# Patient Record
Sex: MALE | Race: WHITE | NOT HISPANIC OR LATINO | Employment: OTHER | URBAN - METROPOLITAN AREA
[De-identification: names, ages, dates, MRNs, and addresses within clinical notes are randomized per-mention and may not be internally consistent; named-entity substitution may affect disease eponyms.]

---

## 2017-05-08 DIAGNOSIS — E78.5 HYPERLIPIDEMIA: ICD-10-CM

## 2017-05-08 DIAGNOSIS — I10 ESSENTIAL (PRIMARY) HYPERTENSION: ICD-10-CM

## 2017-06-02 ENCOUNTER — TRANSCRIBE ORDERS (OUTPATIENT)
Dept: LAB | Facility: CLINIC | Age: 77
End: 2017-06-02

## 2017-06-02 ENCOUNTER — APPOINTMENT (OUTPATIENT)
Dept: LAB | Facility: CLINIC | Age: 77
End: 2017-06-02
Payer: MEDICARE

## 2017-06-02 DIAGNOSIS — E78.5 HYPERLIPIDEMIA: ICD-10-CM

## 2017-06-02 DIAGNOSIS — E83.119 HEMOCHROMATOSIS: ICD-10-CM

## 2017-06-02 DIAGNOSIS — I10 ESSENTIAL (PRIMARY) HYPERTENSION: ICD-10-CM

## 2017-06-02 LAB
ALBUMIN SERPL BCP-MCNC: 4.1 G/DL (ref 3.5–5)
ALP SERPL-CCNC: 56 U/L (ref 46–116)
ALT SERPL W P-5'-P-CCNC: 24 U/L (ref 12–78)
ANION GAP SERPL CALCULATED.3IONS-SCNC: 7 MMOL/L (ref 4–13)
AST SERPL W P-5'-P-CCNC: 18 U/L (ref 5–45)
BASOPHILS # BLD AUTO: 0.06 THOUSANDS/ΜL (ref 0–0.1)
BASOPHILS NFR BLD AUTO: 1 % (ref 0–1)
BILIRUB SERPL-MCNC: 0.83 MG/DL (ref 0.2–1)
BUN SERPL-MCNC: 22 MG/DL (ref 5–25)
CALCIUM SERPL-MCNC: 8.6 MG/DL (ref 8.3–10.1)
CHLORIDE SERPL-SCNC: 108 MMOL/L (ref 100–108)
CHOLEST SERPL-MCNC: 124 MG/DL (ref 50–200)
CO2 SERPL-SCNC: 27 MMOL/L (ref 21–32)
CREAT SERPL-MCNC: 1.13 MG/DL (ref 0.6–1.3)
EOSINOPHIL # BLD AUTO: 0.22 THOUSAND/ΜL (ref 0–0.61)
EOSINOPHIL NFR BLD AUTO: 3 % (ref 0–6)
ERYTHROCYTE [DISTWIDTH] IN BLOOD BY AUTOMATED COUNT: 13 % (ref 11.6–15.1)
FERRITIN SERPL-MCNC: 79 NG/ML (ref 8–388)
GFR SERPL CREATININE-BSD FRML MDRD: >60 ML/MIN/1.73SQ M
GLUCOSE P FAST SERPL-MCNC: 102 MG/DL (ref 65–99)
HCT VFR BLD AUTO: 44.8 % (ref 36.5–49.3)
HDLC SERPL-MCNC: 33 MG/DL (ref 40–60)
HGB BLD-MCNC: 14.7 G/DL (ref 12–17)
LDLC SERPL CALC-MCNC: 71 MG/DL (ref 0–100)
LYMPHOCYTES # BLD AUTO: 2.49 THOUSANDS/ΜL (ref 0.6–4.47)
LYMPHOCYTES NFR BLD AUTO: 35 % (ref 14–44)
MCH RBC QN AUTO: 29.1 PG (ref 26.8–34.3)
MCHC RBC AUTO-ENTMCNC: 32.8 G/DL (ref 31.4–37.4)
MCV RBC AUTO: 89 FL (ref 82–98)
MONOCYTES # BLD AUTO: 0.69 THOUSAND/ΜL (ref 0.17–1.22)
MONOCYTES NFR BLD AUTO: 10 % (ref 4–12)
NEUTROPHILS # BLD AUTO: 3.57 THOUSANDS/ΜL (ref 1.85–7.62)
NEUTS SEG NFR BLD AUTO: 51 % (ref 43–75)
NRBC BLD AUTO-RTO: 0 /100 WBCS
PLATELET # BLD AUTO: 267 THOUSANDS/UL (ref 149–390)
PMV BLD AUTO: 11.4 FL (ref 8.9–12.7)
POTASSIUM SERPL-SCNC: 4.3 MMOL/L (ref 3.5–5.3)
PROT SERPL-MCNC: 7.6 G/DL (ref 6.4–8.2)
RBC # BLD AUTO: 5.05 MILLION/UL (ref 3.88–5.62)
SODIUM SERPL-SCNC: 142 MMOL/L (ref 136–145)
TRIGL SERPL-MCNC: 102 MG/DL
WBC # BLD AUTO: 7.04 THOUSAND/UL (ref 4.31–10.16)

## 2017-06-02 PROCEDURE — 85025 COMPLETE CBC W/AUTO DIFF WBC: CPT

## 2017-06-02 PROCEDURE — 80061 LIPID PANEL: CPT

## 2017-06-02 PROCEDURE — 82728 ASSAY OF FERRITIN: CPT

## 2017-06-02 PROCEDURE — 36415 COLL VENOUS BLD VENIPUNCTURE: CPT

## 2017-06-02 PROCEDURE — 80053 COMPREHEN METABOLIC PANEL: CPT

## 2017-06-05 ENCOUNTER — TRANSCRIBE ORDERS (OUTPATIENT)
Dept: RADIOLOGY | Facility: CLINIC | Age: 77
End: 2017-06-05

## 2017-06-05 ENCOUNTER — HOSPITAL ENCOUNTER (OUTPATIENT)
Dept: RADIOLOGY | Facility: CLINIC | Age: 77
Discharge: HOME | End: 2017-06-05
Payer: MEDICARE

## 2017-06-05 ENCOUNTER — ALLSCRIPTS OFFICE VISIT (OUTPATIENT)
Dept: OTHER | Facility: OTHER | Age: 77
End: 2017-06-05

## 2017-06-05 DIAGNOSIS — M25.861 OTHER SPECIFIED JOINT DISORDERS, RIGHT KNEE: ICD-10-CM

## 2017-06-05 PROCEDURE — 73562 X-RAY EXAM OF KNEE 3: CPT

## 2017-06-06 ENCOUNTER — HOSPITAL ENCOUNTER (OUTPATIENT)
Dept: RADIOLOGY | Facility: CLINIC | Age: 77
Discharge: HOME | End: 2017-06-06
Payer: MEDICARE

## 2017-06-06 DIAGNOSIS — M25.861 OTHER SPECIFIED JOINT DISORDERS, RIGHT KNEE: ICD-10-CM

## 2017-06-06 PROCEDURE — 76882 US LMTD JT/FCL EVL NVASC XTR: CPT

## 2017-06-09 ENCOUNTER — ALLSCRIPTS OFFICE VISIT (OUTPATIENT)
Dept: OTHER | Facility: OTHER | Age: 77
End: 2017-06-09

## 2017-06-12 ENCOUNTER — ALLSCRIPTS OFFICE VISIT (OUTPATIENT)
Dept: OTHER | Facility: OTHER | Age: 77
End: 2017-06-12

## 2017-07-11 ENCOUNTER — APPOINTMENT (OUTPATIENT)
Dept: AUDIOLOGY | Facility: CLINIC | Age: 77
End: 2017-07-11
Payer: MEDICARE

## 2017-07-11 PROCEDURE — 92557 COMPREHENSIVE HEARING TEST: CPT | Performed by: AUDIOLOGIST

## 2017-08-15 ENCOUNTER — APPOINTMENT (OUTPATIENT)
Dept: AUDIOLOGY | Facility: CLINIC | Age: 77
End: 2017-08-15
Payer: COMMERCIAL

## 2017-08-15 PROCEDURE — V5261 HEARING AID, DIGIT, BIN, BTE: HCPCS | Performed by: AUDIOLOGIST

## 2017-09-29 ENCOUNTER — APPOINTMENT (OUTPATIENT)
Dept: RADIOLOGY | Facility: CLINIC | Age: 77
End: 2017-09-29
Payer: MEDICARE

## 2017-09-29 ENCOUNTER — GENERIC CONVERSION - ENCOUNTER (OUTPATIENT)
Dept: OTHER | Facility: OTHER | Age: 77
End: 2017-09-29

## 2017-09-29 ENCOUNTER — TRANSCRIBE ORDERS (OUTPATIENT)
Dept: RADIOLOGY | Facility: CLINIC | Age: 77
End: 2017-09-29

## 2017-09-29 DIAGNOSIS — M79.671 PAIN OF RIGHT FOOT: ICD-10-CM

## 2017-09-29 PROCEDURE — 73630 X-RAY EXAM OF FOOT: CPT

## 2017-10-02 ENCOUNTER — GENERIC CONVERSION - ENCOUNTER (OUTPATIENT)
Dept: OTHER | Facility: OTHER | Age: 77
End: 2017-10-02

## 2017-12-04 ENCOUNTER — TRANSCRIBE ORDERS (OUTPATIENT)
Dept: LAB | Facility: CLINIC | Age: 77
End: 2017-12-04

## 2017-12-04 ENCOUNTER — APPOINTMENT (OUTPATIENT)
Dept: LAB | Facility: CLINIC | Age: 77
End: 2017-12-04
Payer: MEDICARE

## 2017-12-04 DIAGNOSIS — E83.119 HEMOCHROMATOSIS: ICD-10-CM

## 2017-12-04 LAB
ALBUMIN SERPL BCP-MCNC: 4 G/DL (ref 3.5–5)
ALP SERPL-CCNC: 55 U/L (ref 46–116)
ALT SERPL W P-5'-P-CCNC: 28 U/L (ref 12–78)
ANION GAP SERPL CALCULATED.3IONS-SCNC: 7 MMOL/L (ref 4–13)
AST SERPL W P-5'-P-CCNC: 17 U/L (ref 5–45)
BASOPHILS # BLD AUTO: 0.05 THOUSANDS/ΜL (ref 0–0.1)
BASOPHILS NFR BLD AUTO: 1 % (ref 0–1)
BILIRUB SERPL-MCNC: 0.67 MG/DL (ref 0.2–1)
BUN SERPL-MCNC: 19 MG/DL (ref 5–25)
CALCIUM SERPL-MCNC: 9.1 MG/DL (ref 8.3–10.1)
CHLORIDE SERPL-SCNC: 105 MMOL/L (ref 100–108)
CO2 SERPL-SCNC: 28 MMOL/L (ref 21–32)
CREAT SERPL-MCNC: 1.16 MG/DL (ref 0.6–1.3)
EOSINOPHIL # BLD AUTO: 0.17 THOUSAND/ΜL (ref 0–0.61)
EOSINOPHIL NFR BLD AUTO: 3 % (ref 0–6)
ERYTHROCYTE [DISTWIDTH] IN BLOOD BY AUTOMATED COUNT: 13 % (ref 11.6–15.1)
FERRITIN SERPL-MCNC: 86 NG/ML (ref 8–388)
GFR SERPL CREATININE-BSD FRML MDRD: 61 ML/MIN/1.73SQ M
GLUCOSE SERPL-MCNC: 104 MG/DL (ref 65–140)
HCT VFR BLD AUTO: 45 % (ref 36.5–49.3)
HGB BLD-MCNC: 14.8 G/DL (ref 12–17)
LYMPHOCYTES # BLD AUTO: 1.86 THOUSANDS/ΜL (ref 0.6–4.47)
LYMPHOCYTES NFR BLD AUTO: 27 % (ref 14–44)
MCH RBC QN AUTO: 29.1 PG (ref 26.8–34.3)
MCHC RBC AUTO-ENTMCNC: 32.9 G/DL (ref 31.4–37.4)
MCV RBC AUTO: 88 FL (ref 82–98)
MONOCYTES # BLD AUTO: 0.63 THOUSAND/ΜL (ref 0.17–1.22)
MONOCYTES NFR BLD AUTO: 9 % (ref 4–12)
NEUTROPHILS # BLD AUTO: 4.19 THOUSANDS/ΜL (ref 1.85–7.62)
NEUTS SEG NFR BLD AUTO: 60 % (ref 43–75)
NRBC BLD AUTO-RTO: 0 /100 WBCS
PLATELET # BLD AUTO: 264 THOUSANDS/UL (ref 149–390)
PMV BLD AUTO: 10.9 FL (ref 8.9–12.7)
POTASSIUM SERPL-SCNC: 4.1 MMOL/L (ref 3.5–5.3)
PROT SERPL-MCNC: 7.8 G/DL (ref 6.4–8.2)
RBC # BLD AUTO: 5.09 MILLION/UL (ref 3.88–5.62)
SODIUM SERPL-SCNC: 140 MMOL/L (ref 136–145)
WBC # BLD AUTO: 6.92 THOUSAND/UL (ref 4.31–10.16)

## 2017-12-04 PROCEDURE — 82728 ASSAY OF FERRITIN: CPT

## 2017-12-04 PROCEDURE — 36415 COLL VENOUS BLD VENIPUNCTURE: CPT

## 2017-12-04 PROCEDURE — 85025 COMPLETE CBC W/AUTO DIFF WBC: CPT

## 2017-12-04 PROCEDURE — 80053 COMPREHEN METABOLIC PANEL: CPT

## 2017-12-12 ENCOUNTER — GENERIC CONVERSION - ENCOUNTER (OUTPATIENT)
Dept: OTHER | Facility: OTHER | Age: 77
End: 2017-12-12

## 2017-12-12 DIAGNOSIS — E83.119 HEMOCHROMATOSIS: ICD-10-CM

## 2018-01-10 NOTE — PROGRESS NOTES
Chief Complaint  Patient given high dose flu and pneumonia vaccine  /lpn      Active Problems    1  Acute upper respiratory infection (465 9) (J06 9)   2  Benign essential hypertension (401 1) (I10)   3  CABG   4  CAD, multiple vessel (414 00) (I25 10)   5  Encounter for Zostavax administration (V04 89) (Z23)   6  Gout (274 9) (M10 9)   7  Hearing loss (389 9) (H91 90)   8  Heart disease (429 9) (I51 9)   9  Hemochromatosis (275 03) (E83 119)   10  History of colon polyps (V12 72) (Z86 010)   11  Hyperlipidemia (272 4) (E78 5)   12  Iron overload (275 09) (E83 19)   13  MTHFR mutation (270 4) (E72 12)   14  Need for prophylactic vaccination and inoculation against influenza (V04 81) (Z23)   15  Need for vaccination with 13-polyvalent pneumococcal conjugate vaccine (V03 82) (Z23)    Current Meds   1  Aspirin 81 MG CAPS; Take 1 tablet daily; Therapy: (Recorded:82Eye4498) to Recorded   2  Atorvastatin Calcium 10 MG Oral Tablet; TAKE 1 TABLET DAILY AS DIRECTED; Therapy: 26VQW6535 to (Andriy Bedolla)  Requested for: 88FBF2194; Last   Rx:40Wwx4600 Ordered   3  Homocysteine Formula 800- MCG-MG-MCG Oral Tablet; TAKE 1 TABLET DAILY; Therapy: 47OUH1507 to Recorded   4  Intrinsi B12-Folate 220-927-91 MCG-MCG-MG Oral Tablet; Take 1 tablet daily; Therapy: 12PZD3760 to (Evaluate:85Tcx1238); Last Rx:59Wfv8653 Ordered   5  Omega-3 Fish Oil 1000 MG Oral Capsule; Take 1 capsule twice daily; Therapy: (Recorded:48Plw3678) to Recorded    Allergies    1  Amoxil TABS   2   Tylenol TABS    Plan  Need for prophylactic vaccination and inoculation against influenza    · Fluzone High-Dose 0 5 ML Intramuscular Suspension Prefilled Syringe  Need for vaccination with 13-polyvalent pneumococcal conjugate vaccine    · Prevnar 13 Intramuscular Suspension    Future Appointments    Date/Time Provider Specialty Site   11/02/2016 01:00 PM Brianna Darden   12/05/2016 10:30 AM Amor Herb Shelbi Jones MD [facility]     Signatures   Electronically signed by : PRAMOD Harding ; Oct 26 2016  4:16PM EST                       (Author)

## 2018-01-12 VITALS
SYSTOLIC BLOOD PRESSURE: 110 MMHG | BODY MASS INDEX: 26.56 KG/M2 | TEMPERATURE: 96.8 F | RESPIRATION RATE: 16 BRPM | HEIGHT: 67 IN | HEART RATE: 72 BPM | DIASTOLIC BLOOD PRESSURE: 68 MMHG | OXYGEN SATURATION: 98 % | WEIGHT: 169.25 LBS

## 2018-01-14 VITALS
DIASTOLIC BLOOD PRESSURE: 82 MMHG | HEART RATE: 65 BPM | BODY MASS INDEX: 26.72 KG/M2 | HEIGHT: 67 IN | SYSTOLIC BLOOD PRESSURE: 126 MMHG | WEIGHT: 170.25 LBS

## 2018-01-14 VITALS
RESPIRATION RATE: 16 BRPM | SYSTOLIC BLOOD PRESSURE: 120 MMHG | OXYGEN SATURATION: 97 % | DIASTOLIC BLOOD PRESSURE: 70 MMHG | TEMPERATURE: 97.2 F | HEART RATE: 67 BPM | WEIGHT: 167.8 LBS | BODY MASS INDEX: 26.34 KG/M2 | HEIGHT: 67 IN

## 2018-01-15 NOTE — RESULT NOTES
Discussion/Summary   xr negative for fracture, acute process  there are arthritic changes     Verified Results  * XR FOOT 3+ VIEW RIGHT 66Mpw0735 11:56AM [facility] Order Number: [id number]     Test Name Result Flag Reference   XR FOOT 3+ VW RIGHT (Report)     RIGHT FOOT     INDICATION: Right foot pain  COMPARISON: None     VIEWS: 3     IMAGES: 3     FINDINGS:     There is no acute fracture or dislocation  Narrowing at the 1st metatarsal phalangeal joint  No lytic or blastic lesions are seen  Soft tissues are unremarkable  IMPRESSION:     No acute osseous abnormality         Workstation performed: [id number]     Signed by:   Mary Dickens MD   9/29/17       Signatures   Electronically signed by : DERRICK Ashby; Oct  2 2017  8:00AM EST                       (Author)

## 2018-01-22 VITALS
SYSTOLIC BLOOD PRESSURE: 140 MMHG | TEMPERATURE: 98.3 F | HEIGHT: 67 IN | HEART RATE: 66 BPM | BODY MASS INDEX: 26.21 KG/M2 | RESPIRATION RATE: 16 BRPM | WEIGHT: 167 LBS | DIASTOLIC BLOOD PRESSURE: 80 MMHG

## 2018-01-24 VITALS
OXYGEN SATURATION: 98 % | TEMPERATURE: 96.4 F | SYSTOLIC BLOOD PRESSURE: 142 MMHG | DIASTOLIC BLOOD PRESSURE: 72 MMHG | WEIGHT: 164 LBS | HEIGHT: 67 IN | BODY MASS INDEX: 25.74 KG/M2 | RESPIRATION RATE: 16 BRPM | HEART RATE: 74 BPM

## 2018-03-30 ENCOUNTER — TELEPHONE (OUTPATIENT)
Dept: CARDIOLOGY CLINIC | Facility: CLINIC | Age: 78
End: 2018-03-30

## 2018-03-30 DIAGNOSIS — E78.5 HYPERLIPIDEMIA, UNSPECIFIED HYPERLIPIDEMIA TYPE: Primary | ICD-10-CM

## 2018-03-30 RX ORDER — ATORVASTATIN CALCIUM 10 MG/1
1 TABLET, FILM COATED ORAL DAILY
COMMUNITY
Start: 2015-01-19 | End: 2018-03-30 | Stop reason: SDUPTHER

## 2018-03-30 RX ORDER — ATORVASTATIN CALCIUM 10 MG/1
10 TABLET, FILM COATED ORAL DAILY
Qty: 90 TABLET | Refills: 3 | Status: SHIPPED | OUTPATIENT
Start: 2018-03-30 | End: 2018-04-30 | Stop reason: SDUPTHER

## 2018-04-30 DIAGNOSIS — E78.5 HYPERLIPIDEMIA, UNSPECIFIED HYPERLIPIDEMIA TYPE: ICD-10-CM

## 2018-05-01 RX ORDER — ATORVASTATIN CALCIUM 10 MG/1
TABLET, FILM COATED ORAL
Qty: 90 TABLET | Refills: 3 | Status: SHIPPED | OUTPATIENT
Start: 2018-05-01 | End: 2019-04-27 | Stop reason: SDUPTHER

## 2018-05-15 ENCOUNTER — TELEPHONE (OUTPATIENT)
Dept: CARDIOLOGY CLINIC | Facility: CLINIC | Age: 78
End: 2018-05-15

## 2018-05-15 NOTE — TELEPHONE ENCOUNTER
Does he need blood work done for his follow-up? If so please enter order in epic I will mail it to him

## 2018-05-21 DIAGNOSIS — I25.10 CORONARY ARTERY DISEASE INVOLVING NATIVE CORONARY ARTERY OF NATIVE HEART WITHOUT ANGINA PECTORIS: Primary | ICD-10-CM

## 2018-06-08 ENCOUNTER — TRANSCRIBE ORDERS (OUTPATIENT)
Dept: LAB | Facility: CLINIC | Age: 78
End: 2018-06-08

## 2018-06-08 ENCOUNTER — APPOINTMENT (OUTPATIENT)
Dept: LAB | Facility: CLINIC | Age: 78
End: 2018-06-08
Payer: MEDICARE

## 2018-06-08 DIAGNOSIS — I25.10 ATHEROSCLEROSIS OF NATIVE CORONARY ARTERY WITHOUT ANGINA PECTORIS, UNSPECIFIED WHETHER NATIVE OR TRANSPLANTED HEART: Primary | ICD-10-CM

## 2018-06-08 LAB
ALBUMIN SERPL BCP-MCNC: 4 G/DL (ref 3.5–5)
ALP SERPL-CCNC: 49 U/L (ref 46–116)
ALT SERPL W P-5'-P-CCNC: 31 U/L (ref 12–78)
ANION GAP SERPL CALCULATED.3IONS-SCNC: 8 MMOL/L (ref 4–13)
AST SERPL W P-5'-P-CCNC: 20 U/L (ref 5–45)
BILIRUB SERPL-MCNC: 0.97 MG/DL (ref 0.2–1)
BUN SERPL-MCNC: 21 MG/DL (ref 5–25)
CALCIUM SERPL-MCNC: 8.8 MG/DL (ref 8.3–10.1)
CHLORIDE SERPL-SCNC: 107 MMOL/L (ref 100–108)
CHOLEST SERPL-MCNC: 117 MG/DL (ref 50–200)
CO2 SERPL-SCNC: 26 MMOL/L (ref 21–32)
CREAT SERPL-MCNC: 1.25 MG/DL (ref 0.6–1.3)
GFR SERPL CREATININE-BSD FRML MDRD: 56 ML/MIN/1.73SQ M
GLUCOSE P FAST SERPL-MCNC: 110 MG/DL (ref 65–99)
HDLC SERPL-MCNC: 40 MG/DL (ref 40–60)
LDLC SERPL CALC-MCNC: 61 MG/DL (ref 0–100)
NONHDLC SERPL-MCNC: 77 MG/DL
POTASSIUM SERPL-SCNC: 4.1 MMOL/L (ref 3.5–5.3)
PROT SERPL-MCNC: 7.9 G/DL (ref 6.4–8.2)
SODIUM SERPL-SCNC: 141 MMOL/L (ref 136–145)
TRIGL SERPL-MCNC: 81 MG/DL

## 2018-06-08 PROCEDURE — 80053 COMPREHEN METABOLIC PANEL: CPT | Performed by: INTERNAL MEDICINE

## 2018-06-08 PROCEDURE — 36415 COLL VENOUS BLD VENIPUNCTURE: CPT | Performed by: INTERNAL MEDICINE

## 2018-06-08 PROCEDURE — 80061 LIPID PANEL: CPT | Performed by: INTERNAL MEDICINE

## 2018-06-15 ENCOUNTER — OFFICE VISIT (OUTPATIENT)
Dept: CARDIOLOGY CLINIC | Facility: CLINIC | Age: 78
End: 2018-06-15
Payer: MEDICARE

## 2018-06-15 VITALS
HEIGHT: 67 IN | WEIGHT: 167 LBS | HEART RATE: 61 BPM | DIASTOLIC BLOOD PRESSURE: 76 MMHG | BODY MASS INDEX: 26.21 KG/M2 | SYSTOLIC BLOOD PRESSURE: 120 MMHG | OXYGEN SATURATION: 98 %

## 2018-06-15 DIAGNOSIS — I25.10 CAD, MULTIPLE VESSEL: ICD-10-CM

## 2018-06-15 DIAGNOSIS — Z15.89 MTHFR MUTATION: ICD-10-CM

## 2018-06-15 DIAGNOSIS — Z95.1 S/P CABG X 4: ICD-10-CM

## 2018-06-15 DIAGNOSIS — I25.119 CORONARY ARTERY DISEASE INVOLVING NATIVE CORONARY ARTERY OF NATIVE HEART WITH ANGINA PECTORIS (HCC): Primary | ICD-10-CM

## 2018-06-15 DIAGNOSIS — E78.2 MIXED HYPERLIPIDEMIA: ICD-10-CM

## 2018-06-15 PROCEDURE — 99214 OFFICE O/P EST MOD 30 MIN: CPT | Performed by: INTERNAL MEDICINE

## 2018-06-15 PROCEDURE — 93000 ELECTROCARDIOGRAM COMPLETE: CPT | Performed by: INTERNAL MEDICINE

## 2018-06-15 RX ORDER — IBUPROFEN 200 MG
1 TABLET ORAL 3 TIMES DAILY PRN
COMMUNITY
Start: 2017-09-29

## 2018-06-15 RX ORDER — CHLORAL HYDRATE 500 MG
1 CAPSULE ORAL 2 TIMES DAILY
COMMUNITY
End: 2021-04-30

## 2018-06-15 RX ORDER — VIT B12/INTRINSIC FACT/FOLATE 500-20-800
1 TABLET ORAL DAILY
COMMUNITY
Start: 2015-08-18 | End: 2021-01-29

## 2018-06-15 NOTE — PROGRESS NOTES
Vinay Whelan  1940  [id number]  [facility] [address] [address]    Interval History:  Vinay Whelan is a 69 y o  male who presents for routine coronary artery disease follow-up  The patient underwent coronary artery bypass grafting on January 15, 2015  He had LIMA to LAD, SVG to PDA, SVG to OM  Catheterization was done prior to this for evaluation of chest pain  He had a 70% left main stenosis, 60% LAD stenosis, 80% circumflex disease and 90% PDA stenosis  Surgery had no complications  He continues to exercise daily  He denies any dyspnea with exertion, lower extremity edema, paroxysmal nocturnal dyspnea or chest pain  Denies snoring or other ABBY symptoms  He has been eating well and minimizes salt and sugar in diet  Current symptoms: none  Cardiac risk factors include advanced age (older than 54 for men, 72 for women), dyslipidemia and male gender  Past Medical History:   Diagnosis Date    Benign essential hypertension     CAD (coronary artery disease)     Cardiac disorder     Hearing problem     Hx of CABG     Hypercholesteremia      Past Surgical History:   Procedure Laterality Date    CORONARY ARTERY BYPASS GRAFT      HERNIA REPAIR      KNEE SURGERY       Social History     Social History    Marital status: /Civil Union     Spouse name: N/A    Number of children: N/A    Years of education: N/A     Occupational History    Not on file       Social History Main Topics    Smoking status: Former Smoker    Smokeless tobacco: Never Used    Alcohol use No    Drug use: Unknown    Sexual activity: Not on file     Other Topics Concern    Not on file     Social History Narrative    No narrative on file     Family History   Problem Relation Age of Onset    Hypertension Mother     Coronary artery disease Father     Stroke Father     Aneurysm Sister         abdo aorta       Current Outpatient Prescriptions:     aspirin 81 MG tablet, Take 1 tablet by mouth daily, Disp: , Rfl:     atorvastatin (LIPITOR) 10 mg tablet, TAKE 1 TABLET DAILY AS  DIRECTED, Disp: 90 tablet, Rfl: 3    Folate-B12-Intrinsic Factor (INTRINSI Z06-GCBKFV) 815-271-64 MCG-MCG-MG TABS, Take 1 tablet by mouth daily, Disp: , Rfl:     ibuprofen (ADVIL) 200 mg tablet, Take 1 tablet by mouth 3 (three) times a day as needed, Disp: , Rfl:     Multiple Vitamins-Minerals (MULTI-VITAMIN/MINERALS PO), Take 1 tablet by mouth daily, Disp: , Rfl:     Omega-3 Fatty Acids (OMEGA-3 FISH OIL) 1000 MG CAPS, Take 1 capsule by mouth 2 (two) times a day, Disp: , Rfl:   The following portions of the patient's history were reviewed and updated as appropriate: allergies, current medications, past family history, past medical history, past social history, past surgical history and problem list     Review of Systems:  Review of Systems   Constitutional: Negative for chills, fatigue and fever  HENT: Negative for congestion, nosebleeds and postnasal drip  Respiratory: Negative for cough, chest tightness and shortness of breath  Cardiovascular: Negative for chest pain, palpitations and leg swelling  Gastrointestinal: Negative for abdominal distention, abdominal pain, diarrhea, nausea and vomiting  Endocrine: Negative for polydipsia, polyphagia and polyuria  Musculoskeletal: Negative for gait problem and myalgias  Skin: Negative for color change, pallor and rash  Allergic/Immunologic: Negative for environmental allergies, food allergies and immunocompromised state  Neurological: Negative for dizziness, seizures, syncope and light-headedness  Hematological: Negative for adenopathy  Does not bruise/bleed easily  Psychiatric/Behavioral: Negative for dysphoric mood  The patient is not nervous/anxious          Physical Exam:  /76 (BP Location: Left arm, Patient Position: Sitting, Cuff Size: Standard)   Pulse 61 Comment: apical  Ht 5' 6 5" (1 689 m)   Wt 75 8 kg (167 lb)   SpO2 98% Comment: RA  BMI 26 55 kg/m²   Physical Exam   Constitutional: He is oriented to person, place, and time  He appears well-developed  No distress  HENT:   Head: Normocephalic and atraumatic  Eyes: Conjunctivae and EOM are normal  Pupils are equal, round, and reactive to light  Neck: Neck supple  No JVD present  No thyromegaly present  Cardiovascular: Normal rate, regular rhythm, normal heart sounds and intact distal pulses  Exam reveals no gallop and no friction rub  No murmur heard  Pulmonary/Chest: Effort normal and breath sounds normal    Abdominal: Soft  He exhibits no distension  There is no tenderness  Musculoskeletal: He exhibits no edema  Neurological: He is alert and oriented to person, place, and time  No cranial nerve deficit  Skin: Skin is warm and dry  No rash noted  He is not diaphoretic  No erythema  Psychiatric: He has a normal mood and affect  His behavior is normal  Judgment and thought content normal      Cardiographics  ECG: normal sinus rhythm, incomplete RBBB  LV Ejection Fraction: LV ejection fraction >= 40%  Imaging:No results found      Lab Review  Lab Results   Component Value Date    CHOL 117 06/08/2018    CHOL 124 06/02/2017    CHOL 126 05/27/2016    CHOL 160 01/14/2015    TRIG 81 06/08/2018    TRIG 102 06/02/2017    TRIG 88 05/27/2016    TRIG 131 01/14/2015    HDL 40 06/08/2018    HDL 33 (L) 06/02/2017    HDL 35 (L) 05/27/2016    HDL 28 01/14/2015     Orders Only on 05/21/2018   Component Date Value    Cholesterol 06/08/2018 117     Triglycerides 06/08/2018 81     HDL, Direct 06/08/2018 40     LDL Calculated 06/08/2018 61     Non-HDL-Chol (CHOL-HDL) 06/08/2018 77     Sodium 06/08/2018 141     Potassium 06/08/2018 4 1     Chloride 06/08/2018 107     CO2 06/08/2018 26     Anion Gap 06/08/2018 8     BUN 06/08/2018 21     Creatinine 06/08/2018 1 25     Glucose, Fasting 06/08/2018 110*    Calcium 06/08/2018 8 8     AST 06/08/2018 20     ALT 06/08/2018 31     Alkaline Phosphatase 06/08/2018 49     Total Protein 06/08/2018 7 9     Albumin 06/08/2018 4 0     Total Bilirubin 06/08/2018 0 97     eGFR 06/08/2018 56      Assessment/Plan     1  Coronary artery disease involving native coronary artery of native heart with angina pectoris ([facility] )    2  CAD, multiple vessel    3  S/P CABG x 4    4  Mixed hyperlipidemia    5   MTHFR mutation ([facility] )      - LDL at goal (<70 mg/dL)  - BP is well controlled without medications  - continue regular exercise and diet  - Continue atorvastatin

## 2018-10-29 ENCOUNTER — CLINICAL SUPPORT (OUTPATIENT)
Dept: FAMILY MEDICINE CLINIC | Facility: CLINIC | Age: 78
End: 2018-10-29
Payer: MEDICARE

## 2018-10-29 DIAGNOSIS — Z23 NEED FOR INFLUENZA VACCINATION: Primary | ICD-10-CM

## 2018-10-29 PROCEDURE — 90662 IIV NO PRSV INCREASED AG IM: CPT

## 2018-10-29 PROCEDURE — G0008 ADMIN INFLUENZA VIRUS VAC: HCPCS

## 2018-12-10 ENCOUNTER — APPOINTMENT (OUTPATIENT)
Dept: LAB | Facility: CLINIC | Age: 78
End: 2018-12-10
Payer: MEDICARE

## 2018-12-10 ENCOUNTER — TRANSCRIBE ORDERS (OUTPATIENT)
Dept: LAB | Facility: CLINIC | Age: 78
End: 2018-12-10

## 2018-12-10 DIAGNOSIS — E83.119 DILATED CARDIOMYOPATHY SECONDARY TO HEMOCHROMATOSIS (HCC): Primary | ICD-10-CM

## 2018-12-10 DIAGNOSIS — E83.119 HEMOCHROMATOSIS: ICD-10-CM

## 2018-12-10 DIAGNOSIS — I43 DILATED CARDIOMYOPATHY SECONDARY TO HEMOCHROMATOSIS (HCC): Primary | ICD-10-CM

## 2018-12-10 LAB
ALBUMIN SERPL BCP-MCNC: 4.1 G/DL (ref 3.5–5)
ALP SERPL-CCNC: 51 U/L (ref 46–116)
ALT SERPL W P-5'-P-CCNC: 26 U/L (ref 12–78)
ANION GAP SERPL CALCULATED.3IONS-SCNC: 8 MMOL/L (ref 4–13)
AST SERPL W P-5'-P-CCNC: 20 U/L (ref 5–45)
BASOPHILS # BLD AUTO: 0.07 THOUSANDS/ΜL (ref 0–0.1)
BASOPHILS NFR BLD AUTO: 1 % (ref 0–1)
BILIRUB SERPL-MCNC: 0.79 MG/DL (ref 0.2–1)
BUN SERPL-MCNC: 20 MG/DL (ref 5–25)
CALCIUM SERPL-MCNC: 9.4 MG/DL (ref 8.3–10.1)
CHLORIDE SERPL-SCNC: 106 MMOL/L (ref 100–108)
CO2 SERPL-SCNC: 25 MMOL/L (ref 21–32)
CREAT SERPL-MCNC: 1.37 MG/DL (ref 0.6–1.3)
EOSINOPHIL # BLD AUTO: 0.15 THOUSAND/ΜL (ref 0–0.61)
EOSINOPHIL NFR BLD AUTO: 2 % (ref 0–6)
ERYTHROCYTE [DISTWIDTH] IN BLOOD BY AUTOMATED COUNT: 12.2 % (ref 11.6–15.1)
FERRITIN SERPL-MCNC: 88 NG/ML (ref 8–388)
GFR SERPL CREATININE-BSD FRML MDRD: 49 ML/MIN/1.73SQ M
GLUCOSE P FAST SERPL-MCNC: 115 MG/DL (ref 65–99)
HCT VFR BLD AUTO: 47.1 % (ref 36.5–49.3)
HGB BLD-MCNC: 15.3 G/DL (ref 12–17)
IMM GRANULOCYTES # BLD AUTO: 0.01 THOUSAND/UL (ref 0–0.2)
IMM GRANULOCYTES NFR BLD AUTO: 0 % (ref 0–2)
LYMPHOCYTES # BLD AUTO: 1.61 THOUSANDS/ΜL (ref 0.6–4.47)
LYMPHOCYTES NFR BLD AUTO: 25 % (ref 14–44)
MCH RBC QN AUTO: 29 PG (ref 26.8–34.3)
MCHC RBC AUTO-ENTMCNC: 32.5 G/DL (ref 31.4–37.4)
MCV RBC AUTO: 89 FL (ref 82–98)
MONOCYTES # BLD AUTO: 0.52 THOUSAND/ΜL (ref 0.17–1.22)
MONOCYTES NFR BLD AUTO: 8 % (ref 4–12)
NEUTROPHILS # BLD AUTO: 4.2 THOUSANDS/ΜL (ref 1.85–7.62)
NEUTS SEG NFR BLD AUTO: 64 % (ref 43–75)
NRBC BLD AUTO-RTO: 0 /100 WBCS
PLATELET # BLD AUTO: 271 THOUSANDS/UL (ref 149–390)
PMV BLD AUTO: 11 FL (ref 8.9–12.7)
POTASSIUM SERPL-SCNC: 3.8 MMOL/L (ref 3.5–5.3)
PROT SERPL-MCNC: 7.8 G/DL (ref 6.4–8.2)
RBC # BLD AUTO: 5.28 MILLION/UL (ref 3.88–5.62)
SODIUM SERPL-SCNC: 139 MMOL/L (ref 136–145)
WBC # BLD AUTO: 6.56 THOUSAND/UL (ref 4.31–10.16)

## 2018-12-10 PROCEDURE — 85025 COMPLETE CBC W/AUTO DIFF WBC: CPT

## 2018-12-10 PROCEDURE — 36415 COLL VENOUS BLD VENIPUNCTURE: CPT | Performed by: INTERNAL MEDICINE

## 2018-12-10 PROCEDURE — 80053 COMPREHEN METABOLIC PANEL: CPT | Performed by: INTERNAL MEDICINE

## 2018-12-10 PROCEDURE — 82728 ASSAY OF FERRITIN: CPT

## 2018-12-12 DIAGNOSIS — E83.119 HEMOCHROMATOSIS: ICD-10-CM

## 2018-12-17 ENCOUNTER — OFFICE VISIT (OUTPATIENT)
Dept: HEMATOLOGY ONCOLOGY | Facility: MEDICAL CENTER | Age: 78
End: 2018-12-17
Payer: MEDICARE

## 2018-12-17 VITALS
HEIGHT: 67 IN | DIASTOLIC BLOOD PRESSURE: 81 MMHG | SYSTOLIC BLOOD PRESSURE: 129 MMHG | RESPIRATION RATE: 18 BRPM | HEART RATE: 62 BPM | OXYGEN SATURATION: 95 % | TEMPERATURE: 98.1 F | BODY MASS INDEX: 26.68 KG/M2 | WEIGHT: 170 LBS

## 2018-12-17 DIAGNOSIS — R79.89 ELEVATED LFTS: Primary | ICD-10-CM

## 2018-12-17 PROCEDURE — 99213 OFFICE O/P EST LOW 20 MIN: CPT | Performed by: INTERNAL MEDICINE

## 2018-12-17 NOTE — PROGRESS NOTES
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 and a history of elevated LFTs  Previously patient had undergone phlebotomies with normalization of the liver function tests  Last phlebotomy was approximately 3 years ago  From a hematology standpoint, feels well and clinically there are no troubling signs  Recent blood work was good/acceptable with a normal ferritin level  Phlebotomy is not needed  Patient is to return in 1 year with blood work before  From hematology standpoint, Mr Cuca Landry can have an occasional beer/drink - patient understands that excessive alcohol use can affect liver function test     Patient knows to call the hematology/oncology office if there are any other questions or concerns  Carefully review your medication list and verify that the list is accurate and up-to-date  Please call the hematology/oncology office if there are medications missing from the list, medications on the list that you are not currently taking or if there is a dosage or instruction that is different from how you're taking that medication  Patient goals and areas of care:   Monitor LFTs  Barriers to care:   None  Patient is able to self-care   ____________________________________________________________________________________________________    Chief Complaint   Patient presents with    Follow-up     Elevated LFTs     History of Present Illness:    80-year-old male for seen in August 2012 for evaluation of abnormal liver function test   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  After discussing options, patient began phlebotomies; last in November 2015  At that time, liver function tests have been normal   Patient returns for follow-up  Mr Cuca Landry states feeling well, baseline  Appetite is good, weight is stable    Activities are baseline  No fevers, chills or sweats  No headaches, blurred vision or dizziness, no shortness of breath or dyspnea on exertion  No chest pain or pressure  Patient follows up with cardiology - no recent issues  Routine health maintenance and medical care is up-to-date  Review of Systems   Constitutional: Negative  HENT: Negative  Eyes: Negative  Respiratory: Negative  Cardiovascular: Negative  Gastrointestinal: Negative  Endocrine: Negative  Genitourinary: Negative  Musculoskeletal: Negative  Skin: Negative  Allergic/Immunologic: Negative  Neurological: Negative  Hematological: Negative  Psychiatric/Behavioral: Negative  All other systems reviewed and are negative  Patient Active Problem List   Diagnosis    CAD, multiple vessel    S/P CABG x 4    Hyperlipidemia    [facility]     Past Medical History:   Diagnosis Date    Benign essential hypertension     CAD (coronary artery disease)     Cardiac disorder     Hearing problem     Hx of CABG     Hypercholesteremia      Past Surgical History:   Procedure Laterality Date    CORONARY ARTERY BYPASS GRAFT      HERNIA REPAIR      KNEE SURGERY       Family History   Problem Relation Age of Onset    Hypertension Mother     Coronary artery disease Father     Stroke Father     Aneurysm Sister         abdo aorta     Social History     Social History    Marital status: /Civil Union     Spouse name: N/A    Number of children: N/A    Years of education: N/A     Occupational History    Not on file       Social History Main Topics    Smoking status: Former Smoker    Smokeless tobacco: Never Used    Alcohol use No    Drug use: Unknown    Sexual activity: Not on file     Other Topics Concern    Not on file     Social History Narrative    No narrative on file       Current Outpatient Prescriptions:     aspirin 81 MG tablet, Take 1 tablet by mouth daily, Disp: , Rfl:     atorvastatin (LIPITOR) 10 mg tablet, TAKE 1 TABLET DAILY AS  DIRECTED, Disp: 90 tablet, Rfl: 3    Folate-B12-Intrinsic Factor (INTRINSI B12-FOLATE) 811-700-81 MCG-MCG-MG TABS, Take 1 tablet by mouth daily, Disp: , Rfl:     ibuprofen (ADVIL) 200 mg tablet, Take 1 tablet by mouth 3 (three) times a day as needed, Disp: , Rfl:     Multiple Vitamins-Minerals (MULTI-VITAMIN/MINERALS PO), Take 1 tablet by mouth daily, Disp: , Rfl:     Omega-3 Fatty Acids (OMEGA-3 FISH OIL) 1000 MG CAPS, Take 1 capsule by mouth 2 (two) times a day, Disp: , Rfl:     Allergies   Allergen Reactions    Acetaminophen      Reaction Date: [id number]; Annotation - [id number]: throat swelling   mad    Amoxicillin      Reaction Date: [id number]; Annotation - [id number]: rash   mad       Vitals:    12/17/18 0800   BP: 129/81   Pulse: 62   Resp: 18   Temp: 98 1 °F (36 7 °C)   SpO2: 95%     Physical Exam   Constitutional: He is oriented to person, place, and time  He appears well-developed and well-nourished  Well-nourished male, no respiratory distress   HENT:   Head: Normocephalic and atraumatic  Right Ear: External ear normal    Left Ear: External ear normal    Mouth/Throat: Oropharynx is clear and moist    Eyes: Pupils are equal, round, and reactive to light  Conjunctivae and EOM are normal    Neck: Normal range of motion  Neck supple  Supple, no JVD   Cardiovascular: Normal rate, regular rhythm, normal heart sounds and intact distal pulses  Pulmonary/Chest: Effort normal and breath sounds normal    Good air entry bilaterally, clear   Abdominal: Soft  Bowel sounds are normal    Soft, nontender, +bowel sounds, no hepatosplenomegaly   Musculoskeletal: Normal range of motion  Full range of motion in all 4 extremities, no pain or tenderness with palpation of joints, muscles or bones   Neurological: He is alert and oriented to person, place, and time  He has normal reflexes  Skin: Skin is warm     Warm, moist, good color, no petechiae or ecchymoses Psychiatric: He has a normal mood and affect  His behavior is normal  Judgment and thought content normal    Extremities:   No lower extremity edema, no cords, pulses are 1+  Lymphatics:   No adenopathy in the neck    Labs    12/10/2018 WBC = 6 5 hemoglobin = 15 3 hematocrit = 47 1 platelet = 222 neutrophil = 64% BUN = 20 creatinine = 1 37 calcium = 9 4 AST = 20 ALT = 26 alkaline phosphatase = 51 total protein = 7 8 total bilirubin = 0 7 ferritin = 88    12/04/2017 WBC = 6 9 hemoglobin = 14 8 hematocrit = 45 platelet = 611 neutrophil = 60% glucose = 104 BUN = 19 creatinine = 1 16 AST = 17 ALT = 28 total bilirubin = 0 67 alkaline phosphatase = 55 ferritin = 86    Imaging    MRI of the abdomen with and without contrast performed on June 22, 2012 demonstrated 2 subcentimeter benign hemangiomas in the right hepatic lobe  Patient also had hepatic steatosis  Pathology    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

## 2019-04-17 ENCOUNTER — TELEPHONE (OUTPATIENT)
Dept: CARDIOLOGY CLINIC | Facility: CLINIC | Age: 79
End: 2019-04-17

## 2019-04-25 ENCOUNTER — OFFICE VISIT (OUTPATIENT)
Dept: AUDIOLOGY | Facility: CLINIC | Age: 79
End: 2019-04-25

## 2019-04-25 DIAGNOSIS — H90.3 SENSORINEURAL HEARING LOSS, BILATERAL: Primary | ICD-10-CM

## 2019-04-27 DIAGNOSIS — E78.5 HYPERLIPIDEMIA, UNSPECIFIED HYPERLIPIDEMIA TYPE: ICD-10-CM

## 2019-04-28 DIAGNOSIS — I25.10 CAD, MULTIPLE VESSEL: Primary | ICD-10-CM

## 2019-04-28 RX ORDER — ATORVASTATIN CALCIUM 10 MG/1
TABLET, FILM COATED ORAL
Qty: 90 TABLET | Refills: 3 | Status: SHIPPED | OUTPATIENT
Start: 2019-04-28 | End: 2020-05-01 | Stop reason: SDUPTHER

## 2019-05-07 ENCOUNTER — OFFICE VISIT (OUTPATIENT)
Dept: AUDIOLOGY | Facility: CLINIC | Age: 79
End: 2019-05-07

## 2019-05-07 DIAGNOSIS — H90.3 SENSORINEURAL HEARING LOSS, BILATERAL: Primary | ICD-10-CM

## 2019-05-29 ENCOUNTER — TRANSCRIBE ORDERS (OUTPATIENT)
Dept: LAB | Facility: CLINIC | Age: 79
End: 2019-05-29

## 2019-05-29 ENCOUNTER — APPOINTMENT (OUTPATIENT)
Dept: LAB | Facility: CLINIC | Age: 79
End: 2019-05-29
Payer: COMMERCIAL

## 2019-05-29 DIAGNOSIS — I25.10 CAD, MULTIPLE VESSEL: ICD-10-CM

## 2019-05-29 DIAGNOSIS — I25.10 DISEASE OF CARDIOVASCULAR SYSTEM: Primary | ICD-10-CM

## 2019-05-29 LAB
ALBUMIN SERPL BCP-MCNC: 4.2 G/DL (ref 3.5–5)
ALP SERPL-CCNC: 56 U/L (ref 46–116)
ALT SERPL W P-5'-P-CCNC: 28 U/L (ref 12–78)
ANION GAP SERPL CALCULATED.3IONS-SCNC: 11 MMOL/L (ref 4–13)
AST SERPL W P-5'-P-CCNC: 16 U/L (ref 5–45)
BILIRUB SERPL-MCNC: 0.69 MG/DL (ref 0.2–1)
BUN SERPL-MCNC: 18 MG/DL (ref 5–25)
CALCIUM SERPL-MCNC: 8.8 MG/DL (ref 8.3–10.1)
CHLORIDE SERPL-SCNC: 110 MMOL/L (ref 100–108)
CHOLEST SERPL-MCNC: 128 MG/DL (ref 50–200)
CO2 SERPL-SCNC: 23 MMOL/L (ref 21–32)
CREAT SERPL-MCNC: 1.21 MG/DL (ref 0.6–1.3)
GFR SERPL CREATININE-BSD FRML MDRD: 57 ML/MIN/1.73SQ M
GLUCOSE P FAST SERPL-MCNC: 115 MG/DL (ref 65–99)
HDLC SERPL-MCNC: 38 MG/DL (ref 40–60)
LDLC SERPL CALC-MCNC: 63 MG/DL (ref 0–100)
POTASSIUM SERPL-SCNC: 4.1 MMOL/L (ref 3.5–5.3)
PROT SERPL-MCNC: 7.7 G/DL (ref 6.4–8.2)
SODIUM SERPL-SCNC: 144 MMOL/L (ref 136–145)
TRIGL SERPL-MCNC: 136 MG/DL

## 2019-05-29 PROCEDURE — 36415 COLL VENOUS BLD VENIPUNCTURE: CPT | Performed by: INTERNAL MEDICINE

## 2019-05-29 PROCEDURE — 80053 COMPREHEN METABOLIC PANEL: CPT | Performed by: INTERNAL MEDICINE

## 2019-05-29 PROCEDURE — 80061 LIPID PANEL: CPT

## 2019-06-07 ENCOUNTER — OFFICE VISIT (OUTPATIENT)
Dept: CARDIOLOGY CLINIC | Facility: CLINIC | Age: 79
End: 2019-06-07
Payer: COMMERCIAL

## 2019-06-07 VITALS
DIASTOLIC BLOOD PRESSURE: 78 MMHG | WEIGHT: 166 LBS | SYSTOLIC BLOOD PRESSURE: 126 MMHG | BODY MASS INDEX: 26.06 KG/M2 | HEIGHT: 67 IN | OXYGEN SATURATION: 98 % | HEART RATE: 66 BPM

## 2019-06-07 DIAGNOSIS — E78.2 MIXED HYPERLIPIDEMIA: ICD-10-CM

## 2019-06-07 DIAGNOSIS — Z95.1 S/P CABG X 4: ICD-10-CM

## 2019-06-07 DIAGNOSIS — Z15.89 MTHFR MUTATION: ICD-10-CM

## 2019-06-07 DIAGNOSIS — I25.10 CAD, MULTIPLE VESSEL: Primary | ICD-10-CM

## 2019-06-07 PROCEDURE — 93000 ELECTROCARDIOGRAM COMPLETE: CPT | Performed by: INTERNAL MEDICINE

## 2019-06-07 PROCEDURE — 99214 OFFICE O/P EST MOD 30 MIN: CPT | Performed by: INTERNAL MEDICINE

## 2019-11-04 ENCOUNTER — CLINICAL SUPPORT (OUTPATIENT)
Dept: FAMILY MEDICINE CLINIC | Facility: CLINIC | Age: 79
End: 2019-11-04
Payer: COMMERCIAL

## 2019-11-04 DIAGNOSIS — Z23 NEED FOR INFLUENZA VACCINATION: Primary | ICD-10-CM

## 2019-11-04 PROCEDURE — 90662 IIV NO PRSV INCREASED AG IM: CPT

## 2019-11-04 PROCEDURE — G0008 ADMIN INFLUENZA VIRUS VAC: HCPCS

## 2019-11-18 ENCOUNTER — OFFICE VISIT (OUTPATIENT)
Dept: FAMILY MEDICINE CLINIC | Facility: CLINIC | Age: 79
End: 2019-11-18
Payer: COMMERCIAL

## 2019-11-18 VITALS
WEIGHT: 168 LBS | HEIGHT: 67 IN | RESPIRATION RATE: 16 BRPM | HEART RATE: 68 BPM | SYSTOLIC BLOOD PRESSURE: 130 MMHG | TEMPERATURE: 98.6 F | BODY MASS INDEX: 26.37 KG/M2 | DIASTOLIC BLOOD PRESSURE: 80 MMHG

## 2019-11-18 DIAGNOSIS — Z13.820 SCREENING FOR OSTEOPOROSIS: ICD-10-CM

## 2019-11-18 DIAGNOSIS — Z23 ENCOUNTER FOR IMMUNIZATION: ICD-10-CM

## 2019-11-18 DIAGNOSIS — E78.2 MIXED HYPERLIPIDEMIA: ICD-10-CM

## 2019-11-18 DIAGNOSIS — Z12.5 SCREENING PSA (PROSTATE SPECIFIC ANTIGEN): ICD-10-CM

## 2019-11-18 DIAGNOSIS — Z00.00 MEDICARE ANNUAL WELLNESS VISIT, INITIAL: Primary | ICD-10-CM

## 2019-11-18 DIAGNOSIS — E83.118 OTHER HEMOCHROMATOSIS: ICD-10-CM

## 2019-11-18 DIAGNOSIS — I25.10 CAD, MULTIPLE VESSEL: ICD-10-CM

## 2019-11-18 DIAGNOSIS — Z13.1 SCREENING FOR DIABETES MELLITUS (DM): ICD-10-CM

## 2019-11-18 PROCEDURE — 3725F SCREEN DEPRESSION PERFORMED: CPT | Performed by: FAMILY MEDICINE

## 2019-11-18 PROCEDURE — 1101F PT FALLS ASSESS-DOCD LE1/YR: CPT | Performed by: FAMILY MEDICINE

## 2019-11-18 PROCEDURE — G0438 PPPS, INITIAL VISIT: HCPCS | Performed by: FAMILY MEDICINE

## 2019-11-18 PROCEDURE — G0009 ADMIN PNEUMOCOCCAL VACCINE: HCPCS | Performed by: FAMILY MEDICINE

## 2019-11-18 PROCEDURE — 1160F RVW MEDS BY RX/DR IN RCRD: CPT | Performed by: FAMILY MEDICINE

## 2019-11-18 PROCEDURE — 4040F PNEUMOC VAC/ADMIN/RCVD: CPT | Performed by: FAMILY MEDICINE

## 2019-11-18 PROCEDURE — 90732 PPSV23 VACC 2 YRS+ SUBQ/IM: CPT | Performed by: FAMILY MEDICINE

## 2019-11-18 PROCEDURE — 1036F TOBACCO NON-USER: CPT | Performed by: FAMILY MEDICINE

## 2019-11-18 NOTE — PATIENT INSTRUCTIONS

## 2019-11-18 NOTE — PROGRESS NOTES
Assessment and Plan:     Problem List Items Addressed This Visit     None      Visit Diagnoses     Medicare annual wellness visit, initial    -  Primary    Relevant Orders    PSA, Total Screen    DXA bone density spine hip and pelvis    Hemoglobin A1C    Encounter for immunization        Relevant Orders    PNEUMOCOCCAL POLYSACCHARIDE VACCINE 23-VALENT =>1YO SQ IM (Pneumovax) (Completed)    Screening for osteoporosis        Relevant Orders    DXA bone density spine hip and pelvis    Screening for diabetes mellitus (DM)        Relevant Orders    Hemoglobin A1C    Screening PSA (prostate specific antigen)        Relevant Orders    PSA, Total Screen        BMI Counseling: Body mass index is 26 71 kg/m²  The BMI is above normal  Nutrition recommendations include consuming healthier snacks  Exercise recommendations include exercising 3-5 times per week  Coronary artery disease very well managed by Cardiology  Hyperlipidemia taking medications appropriately continue on dosage  Reviewed fasting lipid panel  Hemochromatosis currently 3 years without any phlebotomy levels being monitored by Oncology  Sent for DEXA scan  Encourage use multivitamins  Sent for prostate level  If abnormal or patient becomes symptomatic will do prostate exam at that time      Preventive health issues were discussed with patient, and age appropriate screening tests were ordered as noted in patient's After Visit Summary  Personalized health advice and appropriate referrals for health education or preventive services given if needed, as noted in patient's After Visit Summary  History of Present Illness:     Patient presents for Medicare Annual Wellness visit  Significant past medical history of coronary artery disease and hemochromatosis  Patient states that he has not needed any phlebotomy for his hemochromatosis in over 3 years  He has serial colonoscopies every 5 years with our gastroenterologist given his family history    Patient is coronary artery disease is very well managed with Cardiology after his bypass in 2016  Currently the patient has no symptoms or concerns that he would like to discuss  Denies any shortness of breath or chest pain, fevers or chills, GI or  symptoms  Denies any urinary symptoms as well      Patient Care Team:  Vicky Ashraf MD as PCP - General (Family Medicine)  MD Isma Gamboa MD Raeann Leader, MD Castillo Hyatt DO     Problem List:     Patient Active Problem List   Diagnosis    CAD, multiple vessel    S/P CABG x 4    Hyperlipidemia    MTHFR mutation ([address] )    Elevated LFTs      Past Medical and Surgical History:     Past Medical History:   Diagnosis Date    Benign essential hypertension     CAD (coronary artery disease)     Cardiac disorder     Hearing problem     Hx of CABG     Hypercholesteremia      Past Surgical History:   Procedure Laterality Date    CORONARY ARTERY BYPASS GRAFT      HERNIA REPAIR      KNEE SURGERY        Family History:     Family History   Problem Relation Age of Onset    Hypertension Mother     Coronary artery disease Father     Stroke Father     Aneurysm Sister         abdo aorta      Social History:     Social History     Socioeconomic History    Marital status: /Civil Union     Spouse name: None    Number of children: None    Years of education: None    Highest education level: None   Occupational History    None   Social Needs    Financial resource strain: None    Food insecurity:     Worry: None     Inability: None    Transportation needs:     Medical: None     Non-medical: None   Tobacco Use    Smoking status: Former Smoker    Smokeless tobacco: Never Used   Substance and Sexual Activity    Alcohol use: No    Drug use: None    Sexual activity: None   Lifestyle    Physical activity:     Days per week: None     Minutes per session: None    Stress: None   Relationships    Social connections:     Talks on phone: None Gets together: None     Attends Scientologist service: None     Active member of club or organization: None     Attends meetings of clubs or organizations: None     Relationship status: None    Intimate partner violence:     Fear of current or ex partner: None     Emotionally abused: None     Physically abused: None     Forced sexual activity: None   Other Topics Concern    None   Social History Narrative    None       Medications and Allergies:     Current Outpatient Medications   Medication Sig Dispense Refill    aspirin 81 MG tablet Take 1 tablet by mouth daily      atorvastatin (LIPITOR) 10 mg tablet TAKE 1 TABLET DAILY AS  DIRECTED 90 tablet 3    Folate-B12-Intrinsic Factor (INTRINSI B12-FOLATE) 800-500-20 MCG-MCG-MG TABS Take 1 tablet by mouth daily      ibuprofen (ADVIL) 200 mg tablet Take 1 tablet by mouth 3 (three) times a day as needed      Multiple Vitamins-Minerals (MULTI-VITAMIN/MINERALS PO) Take 1 tablet by mouth daily      Omega-3 Fatty Acids (OMEGA-3 FISH OIL) 1000 MG CAPS Take 1 capsule by mouth 2 (two) times a day       No current facility-administered medications for this visit  Allergies   Allergen Reactions    Acetaminophen      Reaction Date: [id number]; Annotation - [id number]: throat swelling   mad    Amoxicillin      Reaction Date: [id number]; Annotation - [id number]: rash   mad      Immunizations:     Immunization History   Administered Date(s) Administered    Influenza Split High Dose Preservative Free IM 10/02/2012, 10/25/2013, 10/15/2014, 10/23/2015, 10/26/2016, 09/29/2017    Influenza TIV (IM) 11/11/2005, 11/21/2006, 11/13/2007, 11/24/2008, 12/04/2009, 12/01/2010, 09/19/2011    Influenza, high dose seasonal 0 5 mL 10/29/2018, 11/04/2019    Pneumococcal Conjugate 13-Valent 10/26/2016    Pneumococcal Polysaccharide PPV23 11/21/2006, 11/18/2019    Td (adult), adsorbed 10/24/2000    Zoster 10/28/2015      Health Maintenance:         Topic Date Due    CRC Screening: Colonoscopy  1940     There are no preventive care reminders to display for this patient  Medicare Health Risk Assessment:     /80 (BP Location: Right arm, Patient Position: Sitting, Cuff Size: Standard)   Pulse 68   Temp 98 6 °F (37 °C)   Resp 16   Ht 5' 6 5" (1 689 m)   Wt 76 2 kg (168 lb)   BMI 26 71 kg/m²      Bubba Lopez is here for his Initial Wellness visit  Health Risk Assessment:   Patient rates overall health as excellent  Patient feels that their physical health rating is same  Eyesight was rated as same  Hearing was rated as same  Patient feels that their emotional and mental health rating is same  Pain experienced in the last 7 days has been none  Depression Screening:   PHQ-2 Score: 0      Fall Risk Screening: In the past year, patient has experienced: no history of falling in past year      Home Safety:  Patient does not have trouble with stairs inside or outside of their home  Patient has working smoke alarms and has working carbon monoxide detector  Home safety hazards include: none  Nutrition:   Current diet is Regular and No Added Salt  Medications:   Patient is currently taking over-the-counter supplements  OTC medications include: see medication list  Patient is able to manage medications  Activities of Daily Living (ADLs)/Instrumental Activities of Daily Living (IADLs):   Walk and transfer into and out of bed and chair?: Yes  Dress and groom yourself?: Yes    Bathe or shower yourself?: Yes    Feed yourself? Yes  Do your laundry/housekeeping?: Yes  Manage your money, pay your bills and track your expenses?: Yes  Make your own meals?: Yes    Do your own shopping?: Yes    Previous Hospitalizations:   Any hospitalizations or ED visits within the last 12 months?: No      Advance Care Planning:   Living will: Yes    Durable POA for healthcare:  Yes    Advanced directive: Yes    Advanced directive counseling given: Yes    End of Life Decisions reviewed with patient: Yes    Provider agrees with end of life decisions: Yes      Cognitive Screening:   Provider or family/friend/caregiver concerned regarding cognition?: No    PREVENTIVE SCREENINGS      Cardiovascular Screening:    General: Screening Not Indicated, History Lipid Disorder and Screening Current      Diabetes Screening:     General: Risks and Benefits Discussed    Due for: Blood Glucose      Colorectal Cancer Screening:     General: Risks and Benefits Discussed and Screening Current      Prostate Cancer Screening:    General: Screening Not Indicated and Risks and Benefits Discussed    Due for: PSA      Osteoporosis Screening:    General: Risks and Benefits Discussed    Due for: DXA Axial      Abdominal Aortic Aneurysm (AAA) Screening:    Risk factors include: tobacco use        General: Screening Not Indicated      Lung Cancer Screening:     General: Screening Not Indicated      Hepatitis C Screening:    General: Risks and Benefits Discussed    Physical Exam   Constitutional: He is oriented to person, place, and time  He appears well-developed and well-nourished  HENT:   Head: Normocephalic and atraumatic  Right Ear: External ear normal    Left Ear: External ear normal    Nose: Nose normal    Mouth/Throat: Oropharynx is clear and moist  No oropharyngeal exudate  Eyes: Pupils are equal, round, and reactive to light  Conjunctivae and EOM are normal    Neck: Normal range of motion  Neck supple  Cardiovascular: Normal rate, regular rhythm, normal heart sounds and intact distal pulses  Pulmonary/Chest: Effort normal and breath sounds normal    Abdominal: Soft  Bowel sounds are normal  There is no tenderness  Musculoskeletal: Normal range of motion  He exhibits no edema  Neurological: He is alert and oriented to person, place, and time  He displays normal reflexes  No cranial nerve deficit  He exhibits normal muscle tone  Coordination normal    Skin: Skin is warm and dry     Psychiatric: He has a normal mood and affect   His behavior is normal  Judgment and thought content normal        Dino Gaspar MD

## 2019-11-22 ENCOUNTER — HOSPITAL ENCOUNTER (OUTPATIENT)
Dept: RADIOLOGY | Facility: CLINIC | Age: 79
Discharge: HOME | End: 2019-11-22
Payer: COMMERCIAL

## 2019-11-22 ENCOUNTER — TELEPHONE (OUTPATIENT)
Dept: FAMILY MEDICINE CLINIC | Facility: CLINIC | Age: 79
End: 2019-11-22

## 2019-11-22 DIAGNOSIS — Z13.820 SCREENING FOR OSTEOPOROSIS: ICD-10-CM

## 2019-11-22 DIAGNOSIS — Z00.00 MEDICARE ANNUAL WELLNESS VISIT, INITIAL: ICD-10-CM

## 2019-11-22 PROCEDURE — 77080 DXA BONE DENSITY AXIAL: CPT

## 2019-11-25 ENCOUNTER — TELEPHONE (OUTPATIENT)
Dept: FAMILY MEDICINE CLINIC | Facility: CLINIC | Age: 79
End: 2019-11-25

## 2019-11-25 NOTE — TELEPHONE ENCOUNTER
----- Message from Eugenie Nixon MD sent at 11/25/2019  2:29 PM EST -----  Please think the patient for dropping off his power-of- paperwork we have scanned into his chart so that we have it on file    Please advise him that his DEXA scan did show osteopenia which means low bone density  At this time would recommend him to take vitamin-D supplements 2000 units

## 2019-12-04 ENCOUNTER — APPOINTMENT (OUTPATIENT)
Dept: LAB | Facility: CLINIC | Age: 79
End: 2019-12-04
Payer: COMMERCIAL

## 2019-12-04 DIAGNOSIS — Z12.5 SCREENING PSA (PROSTATE SPECIFIC ANTIGEN): ICD-10-CM

## 2019-12-04 DIAGNOSIS — R79.89 ELEVATED LFTS: ICD-10-CM

## 2019-12-04 DIAGNOSIS — Z13.1 SCREENING FOR DIABETES MELLITUS (DM): ICD-10-CM

## 2019-12-04 DIAGNOSIS — Z00.00 MEDICARE ANNUAL WELLNESS VISIT, INITIAL: ICD-10-CM

## 2019-12-04 LAB
ALBUMIN SERPL BCP-MCNC: 4.1 G/DL (ref 3.5–5)
ALP SERPL-CCNC: 53 U/L (ref 46–116)
ALT SERPL W P-5'-P-CCNC: 25 U/L (ref 12–78)
ANION GAP SERPL CALCULATED.3IONS-SCNC: 6 MMOL/L (ref 4–13)
AST SERPL W P-5'-P-CCNC: 12 U/L (ref 5–45)
BASOPHILS # BLD AUTO: 0.07 THOUSANDS/ΜL (ref 0–0.1)
BASOPHILS NFR BLD AUTO: 1 % (ref 0–1)
BILIRUB SERPL-MCNC: 0.85 MG/DL (ref 0.2–1)
BUN SERPL-MCNC: 19 MG/DL (ref 5–25)
CALCIUM SERPL-MCNC: 9.3 MG/DL (ref 8.3–10.1)
CHLORIDE SERPL-SCNC: 110 MMOL/L (ref 100–108)
CO2 SERPL-SCNC: 24 MMOL/L (ref 21–32)
CREAT SERPL-MCNC: 1.3 MG/DL (ref 0.6–1.3)
EOSINOPHIL # BLD AUTO: 0.2 THOUSAND/ΜL (ref 0–0.61)
EOSINOPHIL NFR BLD AUTO: 3 % (ref 0–6)
ERYTHROCYTE [DISTWIDTH] IN BLOOD BY AUTOMATED COUNT: 12.4 % (ref 11.6–15.1)
EST. AVERAGE GLUCOSE BLD GHB EST-MCNC: 117 MG/DL
FERRITIN SERPL-MCNC: 99 NG/ML (ref 8–388)
GFR SERPL CREATININE-BSD FRML MDRD: 52 ML/MIN/1.73SQ M
GLUCOSE P FAST SERPL-MCNC: 124 MG/DL (ref 65–99)
HBA1C MFR BLD: 5.7 % (ref 4.2–6.3)
HCT VFR BLD AUTO: 46.7 % (ref 36.5–49.3)
HGB BLD-MCNC: 15.2 G/DL (ref 12–17)
IMM GRANULOCYTES # BLD AUTO: 0.02 THOUSAND/UL (ref 0–0.2)
IMM GRANULOCYTES NFR BLD AUTO: 0 % (ref 0–2)
LYMPHOCYTES # BLD AUTO: 1.61 THOUSANDS/ΜL (ref 0.6–4.47)
LYMPHOCYTES NFR BLD AUTO: 22 % (ref 14–44)
MCH RBC QN AUTO: 28.9 PG (ref 26.8–34.3)
MCHC RBC AUTO-ENTMCNC: 32.5 G/DL (ref 31.4–37.4)
MCV RBC AUTO: 89 FL (ref 82–98)
MONOCYTES # BLD AUTO: 0.65 THOUSAND/ΜL (ref 0.17–1.22)
MONOCYTES NFR BLD AUTO: 9 % (ref 4–12)
NEUTROPHILS # BLD AUTO: 4.66 THOUSANDS/ΜL (ref 1.85–7.62)
NEUTS SEG NFR BLD AUTO: 65 % (ref 43–75)
NRBC BLD AUTO-RTO: 0 /100 WBCS
PLATELET # BLD AUTO: 280 THOUSANDS/UL (ref 149–390)
PMV BLD AUTO: 11.6 FL (ref 8.9–12.7)
POTASSIUM SERPL-SCNC: 3.9 MMOL/L (ref 3.5–5.3)
PROT SERPL-MCNC: 7.8 G/DL (ref 6.4–8.2)
PSA SERPL-MCNC: 1.1 NG/ML (ref 0–4)
RBC # BLD AUTO: 5.26 MILLION/UL (ref 3.88–5.62)
SODIUM SERPL-SCNC: 140 MMOL/L (ref 136–145)
WBC # BLD AUTO: 7.21 THOUSAND/UL (ref 4.31–10.16)

## 2019-12-04 PROCEDURE — 85025 COMPLETE CBC W/AUTO DIFF WBC: CPT

## 2019-12-04 PROCEDURE — 36415 COLL VENOUS BLD VENIPUNCTURE: CPT

## 2019-12-04 PROCEDURE — 80053 COMPREHEN METABOLIC PANEL: CPT

## 2019-12-04 PROCEDURE — G0103 PSA SCREENING: HCPCS

## 2019-12-04 PROCEDURE — 82728 ASSAY OF FERRITIN: CPT

## 2019-12-04 PROCEDURE — 83036 HEMOGLOBIN GLYCOSYLATED A1C: CPT

## 2019-12-09 ENCOUNTER — TELEPHONE (OUTPATIENT)
Dept: FAMILY MEDICINE CLINIC | Facility: CLINIC | Age: 79
End: 2019-12-09

## 2019-12-09 NOTE — TELEPHONE ENCOUNTER
----- Message from Cassandra Ruelas MD sent at 12/9/2019  1:19 PM EST -----  Please advise the patient that his PSA is normal  Also His DM screening has shown amazing improvement since the previous year   A1c is now 5 7 from 6 2; continue to be Pre DM, however risk is a lot lower compared to the last

## 2019-12-09 NOTE — TELEPHONE ENCOUNTER
I informed pt of his lab results and your instructions but pt stated that you had mentioned him possibly having a hernia and he wanted to know if there is anything he needs to do such as see a general surgeon?

## 2019-12-09 NOTE — TELEPHONE ENCOUNTER
Spoke to patient about his Ventral Hernia, at this time reducible, and only seen on exertion  Patient was unaware of it until I pointed it out  If he ever has an discomfort to please contact our office

## 2019-12-19 ENCOUNTER — OFFICE VISIT (OUTPATIENT)
Dept: HEMATOLOGY ONCOLOGY | Facility: MEDICAL CENTER | Age: 79
End: 2019-12-19
Payer: COMMERCIAL

## 2019-12-19 VITALS
WEIGHT: 169 LBS | HEIGHT: 67 IN | RESPIRATION RATE: 16 BRPM | SYSTOLIC BLOOD PRESSURE: 162 MMHG | DIASTOLIC BLOOD PRESSURE: 84 MMHG | BODY MASS INDEX: 26.53 KG/M2 | TEMPERATURE: 96.5 F | HEART RATE: 72 BPM | OXYGEN SATURATION: 98 %

## 2019-12-19 DIAGNOSIS — R79.89 ELEVATED LFTS: Primary | ICD-10-CM

## 2019-12-19 DIAGNOSIS — E83.118 OTHER HEMOCHROMATOSIS: ICD-10-CM

## 2019-12-19 PROCEDURE — 99213 OFFICE O/P EST LOW 20 MIN: CPT | Performed by: INTERNAL MEDICINE

## 2019-12-19 RX ORDER — CLINDAMYCIN HYDROCHLORIDE 300 MG/1
CAPSULE ORAL
COMMUNITY
Start: 2019-11-22 | End: 2021-01-11

## 2019-12-19 RX ORDER — CHLORHEXIDINE GLUCONATE 0.12 MG/ML
RINSE ORAL
COMMUNITY
Start: 2019-11-22 | End: 2021-01-11

## 2019-12-19 NOTE — PROGRESS NOTES
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 and a history of elevated LFTs  Previously patient had undergone phlebotomies with normalization of the liver function tests  Last phlebotomy was approximately 4 years ago  From a hematology standpoint, feels well and clinically there are no troubling signs  Recent blood work was good/acceptable with a normal ferritin level  Phlebotomy is not needed  Patient is to return in 1 year with blood work before  Routine health maintenance and medical care is up-to-date  Patient knows to call the hematology/oncology office if there are any other questions or concerns  Carefully review your medication list and verify that the list is accurate and up-to-date  Please call the hematology/oncology office if there are medications missing from the list, medications on the list that you are not currently taking or if there is a dosage or instruction that is different from how you're taking that medication  Patient goals and areas of care:   Monitor LFTs and ferritin level  Barriers to care:   None  Patient is able to self-care   ____________________________________________________________________________________________________    Chief Complaint   Patient presents with    Follow-up     Elevated ferritin level, on surveillance     History of Present Illness:    32-year-old male for seen in August 2012 for evaluation of abnormal liver function test   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  After discussing options, patient began phlebotomies; last in November 2015  At that time, liver function tests have been normal   Patient returns for follow-up  Mr Nyasia Garcia continues to feel well, same as before  Appetite is good, weight is stable  Patient continues to be active    No pain control issues  No headaches, blurred vision or dizziness  No GI or  issues  No shortness of breath or dyspnea on exertion  No other active medical issues  Review of Systems   Constitutional: Negative  HENT: Negative  Eyes: Negative  Respiratory: Negative  Cardiovascular: Negative  Gastrointestinal: Negative  Endocrine: Negative  Genitourinary: Negative  Musculoskeletal: Negative  Skin: Negative  Allergic/Immunologic: Negative  Neurological: Negative  Hematological: Negative  Psychiatric/Behavioral: Negative  All other systems reviewed and are negative       Patient Active Problem List   Diagnosis    CAD, multiple vessel    S/P CABG x 4    Hyperlipidemia    MTHFR mutation (HCC)    Elevated LFTs    Other hemochromatosis     Past Medical History:   Diagnosis Date    Benign essential hypertension     CAD (coronary artery disease)     Cardiac disorder     Hearing problem     Hx of CABG     Hypercholesteremia      Past Surgical History:   Procedure Laterality Date    CORONARY ARTERY BYPASS GRAFT      HERNIA REPAIR      KNEE SURGERY       Family History   Problem Relation Age of Onset    Hypertension Mother     Coronary artery disease Father     Stroke Father     Aneurysm Sister         abdo aorta     Social History     Socioeconomic History    Marital status: /Civil Union     Spouse name: Not on file    Number of children: Not on file    Years of education: Not on file    Highest education level: Not on file   Occupational History    Not on file   Social Needs    Financial resource strain: Not on file    Food insecurity:     Worry: Not on file     Inability: Not on file    Transportation needs:     Medical: Not on file     Non-medical: Not on file   Tobacco Use    Smoking status: Former Smoker    Smokeless tobacco: Never Used   Substance and Sexual Activity    Alcohol use: No    Drug use: Not on file    Sexual activity: Not on file Lifestyle    Physical activity:     Days per week: Not on file     Minutes per session: Not on file    Stress: Not on file   Relationships    Social connections:     Talks on phone: Not on file     Gets together: Not on file     Attends Yazidism service: Not on file     Active member of club or organization: Not on file     Attends meetings of clubs or organizations: Not on file     Relationship status: Not on file    Intimate partner violence:     Fear of current or ex partner: Not on file     Emotionally abused: Not on file     Physically abused: Not on file     Forced sexual activity: Not on file   Other Topics Concern    Not on file   Social History Narrative    Not on file       Current Outpatient Medications:     aspirin 81 MG tablet, Take 1 tablet by mouth daily, Disp: , Rfl:     atorvastatin (LIPITOR) 10 mg tablet, TAKE 1 TABLET DAILY AS  DIRECTED, Disp: 90 tablet, Rfl: 3    Folate-B12-Intrinsic Factor (INTRINSI B12-FOLATE) 800-500-20 MCG-MCG-MG TABS, Take 1 tablet by mouth daily, Disp: , Rfl:     Multiple Vitamins-Minerals (MULTI-VITAMIN/MINERALS PO), Take 1 tablet by mouth daily, Disp: , Rfl:     Omega-3 Fatty Acids (OMEGA-3 FISH OIL) 1000 MG CAPS, Take 1 capsule by mouth 2 (two) times a day, Disp: , Rfl:     VITAMIN D PO, Take 2,000 Units by mouth, Disp: , Rfl:     chlorhexidine (PERIDEX) 0 12 % solution, , Disp: , Rfl:     clindamycin (CLEOCIN) 300 MG capsule, , Disp: , Rfl:     ibuprofen (ADVIL) 200 mg tablet, Take 1 tablet by mouth 3 (three) times a day as needed, Disp: , Rfl:     Allergies   Allergen Reactions    Acetaminophen      Reaction Date: [id number]; Annotation - [id number]: throat swelling   mad    Amoxicillin      Reaction Date: [id number]; Annotation - [id number]: rash   mad       Vitals:    12/19/19 0837   BP: 162/84   Pulse: 72   Resp: 16   Temp: (!) 96 5 °F (35 8 °C)   SpO2: 98%     Physical Exam   Constitutional: He is oriented to person, place, and time   He appears well-developed and well-nourished  Well-nourished male, no respiratory distress   HENT:   Head: Normocephalic and atraumatic  Right Ear: External ear normal    Left Ear: External ear normal    Mouth/Throat: Oropharynx is clear and moist    Eyes: Pupils are equal, round, and reactive to light  Conjunctivae and EOM are normal    Neck: Normal range of motion  Neck supple  Supple, no JVD   Cardiovascular: Normal rate, regular rhythm, normal heart sounds and intact distal pulses  Pulmonary/Chest: Effort normal and breath sounds normal    Good air entry bilaterally, clear   Abdominal: Soft  Bowel sounds are normal    Soft, nontender, +bowel sounds, no hepatosplenomegaly   Musculoskeletal: Normal range of motion  Full range of motion in all 4 extremities, no pain or tenderness with palpation of joints, muscles or bones   Neurological: He is alert and oriented to person, place, and time  He has normal reflexes  Skin: Skin is warm  Warm, moist, good color, no petechiae or ecchymoses   Psychiatric: He has a normal mood and affect   His behavior is normal  Judgment and thought content normal    Extremities:   No lower extremity edema bilaterally, no cords, pulses are 1+  Lymphatics:   No adenopathy in the neck    Labs    12/04/2019 WBC = 7 2 hemoglobin = 15 2 hematocrit = 46 7 MCV = 89 platelet = 738 neutrophil = 65% BUN = 19 creatinine = 1 30 calcium = 9 3 LFTs WNL alkaline phosphatase = 53 ferritin = 99 PSA = 1 1    12/10/2018 WBC = 6 5 hemoglobin = 15 3 hematocrit = 47 1 platelet = 333 neutrophil = 64% BUN = 20 creatinine = 1 37 calcium = 9 4 AST = 20 ALT = 26 alkaline phosphatase = 51 total protein = 7 8 total bilirubin = 0 7 ferritin = 88    12/04/2017 WBC = 6 9 hemoglobin = 14 8 hematocrit = 45 platelet = 227 neutrophil = 60% glucose = 104 BUN = 19 creatinine = 1 16 AST = 17 ALT = 28 total bilirubin = 0 67 alkaline phosphatase = 55 ferritin = 86    Imaging    MRI of the abdomen with and without contrast performed on June 22, 2012 demonstrated 2 subcentimeter benign hemangiomas in the right hepatic lobe  Patient also had hepatic steatosis  Pathology    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

## 2020-04-29 DIAGNOSIS — E78.5 HYPERLIPIDEMIA, UNSPECIFIED HYPERLIPIDEMIA TYPE: ICD-10-CM

## 2020-05-01 ENCOUNTER — TELEPHONE (OUTPATIENT)
Dept: CARDIOLOGY CLINIC | Facility: CLINIC | Age: 80
End: 2020-05-01

## 2020-05-01 RX ORDER — ATORVASTATIN CALCIUM 10 MG/1
TABLET, FILM COATED ORAL
Qty: 90 TABLET | Refills: 3 | Status: SHIPPED | OUTPATIENT
Start: 2020-05-01 | End: 2021-06-09 | Stop reason: SDUPTHER

## 2020-05-04 DIAGNOSIS — E78.2 MIXED HYPERLIPIDEMIA: Primary | ICD-10-CM

## 2020-05-18 ENCOUNTER — TELEPHONE (OUTPATIENT)
Dept: FAMILY MEDICINE CLINIC | Facility: CLINIC | Age: 80
End: 2020-05-18

## 2020-05-18 DIAGNOSIS — H91.93 BILATERAL HEARING LOSS, UNSPECIFIED HEARING LOSS TYPE: Primary | ICD-10-CM

## 2020-05-26 ENCOUNTER — OFFICE VISIT (OUTPATIENT)
Dept: AUDIOLOGY | Facility: CLINIC | Age: 80
End: 2020-05-26
Payer: COMMERCIAL

## 2020-05-26 DIAGNOSIS — H90.3 SENSORINEURAL HEARING LOSS, BILATERAL: Primary | ICD-10-CM

## 2020-05-26 PROCEDURE — 92557 COMPREHENSIVE HEARING TEST: CPT | Performed by: AUDIOLOGIST

## 2020-05-26 PROCEDURE — 92567 TYMPANOMETRY: CPT | Performed by: AUDIOLOGIST

## 2020-06-03 LAB
ALBUMIN SERPL-MCNC: 4.2 G/DL (ref 3.7–4.7)
ALBUMIN/GLOB SERPL: 1.6 {RATIO} (ref 1.2–2.2)
ALP SERPL-CCNC: 47 IU/L (ref 39–117)
ALT SERPL-CCNC: 20 IU/L (ref 0–44)
AST SERPL-CCNC: 22 IU/L (ref 0–40)
BILIRUB SERPL-MCNC: 0.6 MG/DL (ref 0–1.2)
BUN SERPL-MCNC: 20 MG/DL (ref 8–27)
BUN/CREAT SERPL: 17 (ref 10–24)
CALCIUM SERPL-MCNC: 9 MG/DL (ref 8.6–10.2)
CHLORIDE SERPL-SCNC: 104 MMOL/L (ref 96–106)
CHOLEST SERPL-MCNC: 120 MG/DL (ref 100–199)
CO2 SERPL-SCNC: 24 MMOL/L (ref 20–29)
CREAT SERPL-MCNC: 1.21 MG/DL (ref 0.76–1.27)
GLOBULIN SER-MCNC: 2.6 G/DL (ref 1.5–4.5)
GLUCOSE SERPL-MCNC: 99 MG/DL (ref 65–99)
HDLC SERPL-MCNC: 31 MG/DL
LDLC SERPL CALC-MCNC: 69 MG/DL (ref 0–99)
POTASSIUM SERPL-SCNC: 4.3 MMOL/L (ref 3.5–5.2)
PROT SERPL-MCNC: 6.8 G/DL (ref 6–8.5)
SL AMB EGFR AFRICAN AMERICAN: 66 ML/MIN/1.73
SL AMB EGFR NON AFRICAN AMERICAN: 57 ML/MIN/1.73
SL AMB VLDL CHOLESTEROL CALC: 20 MG/DL (ref 5–40)
SODIUM SERPL-SCNC: 142 MMOL/L (ref 134–144)
TRIGL SERPL-MCNC: 100 MG/DL (ref 0–149)

## 2020-06-08 ENCOUNTER — OFFICE VISIT (OUTPATIENT)
Dept: CARDIOLOGY CLINIC | Facility: CLINIC | Age: 80
End: 2020-06-08
Payer: COMMERCIAL

## 2020-06-08 ENCOUNTER — TELEPHONE (OUTPATIENT)
Dept: CARDIOLOGY CLINIC | Facility: CLINIC | Age: 80
End: 2020-06-08

## 2020-06-08 VITALS
DIASTOLIC BLOOD PRESSURE: 70 MMHG | SYSTOLIC BLOOD PRESSURE: 130 MMHG | WEIGHT: 168 LBS | HEIGHT: 67 IN | OXYGEN SATURATION: 98 % | HEART RATE: 70 BPM | TEMPERATURE: 99.1 F | BODY MASS INDEX: 26.37 KG/M2

## 2020-06-08 DIAGNOSIS — Z95.1 S/P CABG X 4: ICD-10-CM

## 2020-06-08 DIAGNOSIS — I25.10 CAD, MULTIPLE VESSEL: Primary | ICD-10-CM

## 2020-06-08 DIAGNOSIS — E78.2 MIXED HYPERLIPIDEMIA: ICD-10-CM

## 2020-06-08 DIAGNOSIS — Z15.89 MTHFR MUTATION: ICD-10-CM

## 2020-06-08 PROCEDURE — 93000 ELECTROCARDIOGRAM COMPLETE: CPT | Performed by: INTERNAL MEDICINE

## 2020-06-08 PROCEDURE — 4040F PNEUMOC VAC/ADMIN/RCVD: CPT | Performed by: INTERNAL MEDICINE

## 2020-06-08 PROCEDURE — 1160F RVW MEDS BY RX/DR IN RCRD: CPT | Performed by: INTERNAL MEDICINE

## 2020-06-08 PROCEDURE — 3075F SYST BP GE 130 - 139MM HG: CPT | Performed by: INTERNAL MEDICINE

## 2020-06-08 PROCEDURE — 3008F BODY MASS INDEX DOCD: CPT | Performed by: INTERNAL MEDICINE

## 2020-06-08 PROCEDURE — 3078F DIAST BP <80 MM HG: CPT | Performed by: INTERNAL MEDICINE

## 2020-06-08 PROCEDURE — 1036F TOBACCO NON-USER: CPT | Performed by: INTERNAL MEDICINE

## 2020-06-08 PROCEDURE — 99213 OFFICE O/P EST LOW 20 MIN: CPT | Performed by: INTERNAL MEDICINE

## 2020-07-07 ENCOUNTER — OFFICE VISIT (OUTPATIENT)
Dept: AUDIOLOGY | Facility: CLINIC | Age: 80
End: 2020-07-07

## 2020-07-07 DIAGNOSIS — H90.3 SENSORINEURAL HEARING LOSS, BILATERAL: Primary | ICD-10-CM

## 2020-07-07 NOTE — PROGRESS NOTES
Progress Note    Name:  Ramiro Quinonez  :  1940  Age:  67 y o  Date of Evaluation: 20     Patient re-fit with L hearing aid from lab repair (new serial number [id number], expiration 2020)      Patient wanted his  EPOQ XW aids set to new audiogram   The left one is not working 100% (may be )  Discussed  Demo'd the ConnectClip due to his wife's voice issues secondary to medical issues  He is not interested at this time but found it of good quality        Marina Martin , CCC-A, [place]# [id number], Hearing Aid Dispenser, [place]# [id number]  Clinical Audiologist

## 2020-11-11 ENCOUNTER — TELEPHONE (OUTPATIENT)
Dept: GASTROENTEROLOGY | Facility: AMBULARY SURGERY CENTER | Age: 80
End: 2020-11-11

## 2020-11-11 DIAGNOSIS — Z12.11 SCREEN FOR COLON CANCER: Primary | ICD-10-CM

## 2020-11-11 RX ORDER — SODIUM, POTASSIUM,MAG SULFATES 17.5-3.13G
2 SOLUTION, RECONSTITUTED, ORAL ORAL SEE ADMIN INSTRUCTIONS
Qty: 2 BOTTLE | Refills: 0 | Status: SHIPPED | OUTPATIENT
Start: 2020-11-11 | End: 2021-01-29

## 2020-11-20 ENCOUNTER — OFFICE VISIT (OUTPATIENT)
Dept: FAMILY MEDICINE CLINIC | Facility: CLINIC | Age: 80
End: 2020-11-20
Payer: COMMERCIAL

## 2020-11-20 VITALS
SYSTOLIC BLOOD PRESSURE: 158 MMHG | DIASTOLIC BLOOD PRESSURE: 80 MMHG | WEIGHT: 162 LBS | BODY MASS INDEX: 25.43 KG/M2 | HEIGHT: 67 IN | RESPIRATION RATE: 14 BRPM | TEMPERATURE: 97.9 F | HEART RATE: 84 BPM

## 2020-11-20 DIAGNOSIS — E83.118 OTHER HEMOCHROMATOSIS: ICD-10-CM

## 2020-11-20 DIAGNOSIS — Z12.5 SCREENING PSA (PROSTATE SPECIFIC ANTIGEN): ICD-10-CM

## 2020-11-20 DIAGNOSIS — Z00.00 MEDICARE ANNUAL WELLNESS VISIT, SUBSEQUENT: Primary | ICD-10-CM

## 2020-11-20 DIAGNOSIS — Z23 NEED FOR VACCINATION: ICD-10-CM

## 2020-11-20 DIAGNOSIS — M85.80 OSTEOPENIA, UNSPECIFIED LOCATION: ICD-10-CM

## 2020-11-20 PROCEDURE — 1170F FXNL STATUS ASSESSED: CPT | Performed by: FAMILY MEDICINE

## 2020-11-20 PROCEDURE — 1125F AMNT PAIN NOTED PAIN PRSNT: CPT | Performed by: FAMILY MEDICINE

## 2020-11-20 PROCEDURE — 3288F FALL RISK ASSESSMENT DOCD: CPT | Performed by: FAMILY MEDICINE

## 2020-11-20 PROCEDURE — 90662 IIV NO PRSV INCREASED AG IM: CPT | Performed by: FAMILY MEDICINE

## 2020-11-20 PROCEDURE — 1036F TOBACCO NON-USER: CPT | Performed by: FAMILY MEDICINE

## 2020-11-20 PROCEDURE — 3725F SCREEN DEPRESSION PERFORMED: CPT | Performed by: FAMILY MEDICINE

## 2020-11-20 PROCEDURE — G0008 ADMIN INFLUENZA VIRUS VAC: HCPCS | Performed by: FAMILY MEDICINE

## 2020-11-20 PROCEDURE — G0439 PPPS, SUBSEQ VISIT: HCPCS | Performed by: FAMILY MEDICINE

## 2020-11-20 PROCEDURE — 1160F RVW MEDS BY RX/DR IN RCRD: CPT | Performed by: FAMILY MEDICINE

## 2020-11-20 PROCEDURE — 1101F PT FALLS ASSESS-DOCD LE1/YR: CPT | Performed by: FAMILY MEDICINE

## 2020-11-30 ENCOUNTER — APPOINTMENT (OUTPATIENT)
Dept: LAB | Facility: CLINIC | Age: 80
End: 2020-11-30
Payer: COMMERCIAL

## 2020-11-30 DIAGNOSIS — Z12.5 SCREENING PSA (PROSTATE SPECIFIC ANTIGEN): ICD-10-CM

## 2020-11-30 DIAGNOSIS — E83.118 OTHER HEMOCHROMATOSIS: Primary | ICD-10-CM

## 2020-11-30 DIAGNOSIS — R79.89 ELEVATED LFTS: ICD-10-CM

## 2020-11-30 LAB
ALBUMIN SERPL BCP-MCNC: 3.8 G/DL (ref 3.5–5)
ALP SERPL-CCNC: 54 U/L (ref 46–116)
ALT SERPL W P-5'-P-CCNC: 25 U/L (ref 12–78)
ANION GAP SERPL CALCULATED.3IONS-SCNC: 7 MMOL/L (ref 4–13)
AST SERPL W P-5'-P-CCNC: 14 U/L (ref 5–45)
BASOPHILS # BLD AUTO: 0.08 THOUSANDS/ΜL (ref 0–0.1)
BASOPHILS NFR BLD AUTO: 1 % (ref 0–1)
BILIRUB SERPL-MCNC: 0.67 MG/DL (ref 0.2–1)
BUN SERPL-MCNC: 18 MG/DL (ref 5–25)
CALCIUM SERPL-MCNC: 9.1 MG/DL (ref 8.3–10.1)
CHLORIDE SERPL-SCNC: 109 MMOL/L (ref 100–108)
CO2 SERPL-SCNC: 25 MMOL/L (ref 21–32)
CREAT SERPL-MCNC: 1.12 MG/DL (ref 0.6–1.3)
EOSINOPHIL # BLD AUTO: 0.15 THOUSAND/ΜL (ref 0–0.61)
EOSINOPHIL NFR BLD AUTO: 3 % (ref 0–6)
ERYTHROCYTE [DISTWIDTH] IN BLOOD BY AUTOMATED COUNT: 12.5 % (ref 11.6–15.1)
FERRITIN SERPL-MCNC: 97 NG/ML (ref 8–388)
GFR SERPL CREATININE-BSD FRML MDRD: 62 ML/MIN/1.73SQ M
GLUCOSE P FAST SERPL-MCNC: 100 MG/DL (ref 65–99)
HCT VFR BLD AUTO: 44.6 % (ref 36.5–49.3)
HGB BLD-MCNC: 14.7 G/DL (ref 12–17)
IMM GRANULOCYTES # BLD AUTO: 0.01 THOUSAND/UL (ref 0–0.2)
IMM GRANULOCYTES NFR BLD AUTO: 0 % (ref 0–2)
IRON SATN MFR SERPL: 35 %
IRON SERPL-MCNC: 96 UG/DL (ref 65–175)
LYMPHOCYTES # BLD AUTO: 1.33 THOUSANDS/ΜL (ref 0.6–4.47)
LYMPHOCYTES NFR BLD AUTO: 23 % (ref 14–44)
MCH RBC QN AUTO: 29.2 PG (ref 26.8–34.3)
MCHC RBC AUTO-ENTMCNC: 33 G/DL (ref 31.4–37.4)
MCV RBC AUTO: 89 FL (ref 82–98)
MONOCYTES # BLD AUTO: 0.61 THOUSAND/ΜL (ref 0.17–1.22)
MONOCYTES NFR BLD AUTO: 10 % (ref 4–12)
NEUTROPHILS # BLD AUTO: 3.73 THOUSANDS/ΜL (ref 1.85–7.62)
NEUTS SEG NFR BLD AUTO: 63 % (ref 43–75)
NRBC BLD AUTO-RTO: 0 /100 WBCS
PLATELET # BLD AUTO: 258 THOUSANDS/UL (ref 149–390)
PMV BLD AUTO: 11.2 FL (ref 8.9–12.7)
POTASSIUM SERPL-SCNC: 4.1 MMOL/L (ref 3.5–5.3)
PROT SERPL-MCNC: 7.2 G/DL (ref 6.4–8.2)
PSA SERPL-MCNC: 1.6 NG/ML (ref 0–4)
RBC # BLD AUTO: 5.03 MILLION/UL (ref 3.88–5.62)
SODIUM SERPL-SCNC: 141 MMOL/L (ref 136–145)
TIBC SERPL-MCNC: 278 UG/DL (ref 250–450)
WBC # BLD AUTO: 5.91 THOUSAND/UL (ref 4.31–10.16)

## 2020-11-30 PROCEDURE — 85025 COMPLETE CBC W/AUTO DIFF WBC: CPT

## 2020-11-30 PROCEDURE — 82728 ASSAY OF FERRITIN: CPT

## 2020-11-30 PROCEDURE — 83540 ASSAY OF IRON: CPT

## 2020-11-30 PROCEDURE — G0103 PSA SCREENING: HCPCS

## 2020-11-30 PROCEDURE — 83550 IRON BINDING TEST: CPT

## 2020-11-30 PROCEDURE — 80053 COMPREHEN METABOLIC PANEL: CPT

## 2020-11-30 PROCEDURE — 36415 COLL VENOUS BLD VENIPUNCTURE: CPT

## 2020-12-07 ENCOUNTER — TELEPHONE (OUTPATIENT)
Dept: HEMATOLOGY ONCOLOGY | Facility: CLINIC | Age: 80
End: 2020-12-07

## 2020-12-10 ENCOUNTER — OFFICE VISIT (OUTPATIENT)
Dept: HEMATOLOGY ONCOLOGY | Facility: MEDICAL CENTER | Age: 80
End: 2020-12-10
Payer: COMMERCIAL

## 2020-12-10 VITALS
WEIGHT: 164.2 LBS | BODY MASS INDEX: 25.77 KG/M2 | OXYGEN SATURATION: 99 % | TEMPERATURE: 96.7 F | HEART RATE: 65 BPM | RESPIRATION RATE: 18 BRPM | DIASTOLIC BLOOD PRESSURE: 88 MMHG | SYSTOLIC BLOOD PRESSURE: 162 MMHG | HEIGHT: 67 IN

## 2020-12-10 DIAGNOSIS — E83.118 OTHER HEMOCHROMATOSIS: Primary | ICD-10-CM

## 2020-12-10 PROCEDURE — 1160F RVW MEDS BY RX/DR IN RCRD: CPT | Performed by: INTERNAL MEDICINE

## 2020-12-10 PROCEDURE — 99213 OFFICE O/P EST LOW 20 MIN: CPT | Performed by: INTERNAL MEDICINE

## 2020-12-10 PROCEDURE — 1036F TOBACCO NON-USER: CPT | Performed by: INTERNAL MEDICINE

## 2020-12-14 ENCOUNTER — TELEPHONE (OUTPATIENT)
Dept: CARDIOLOGY CLINIC | Facility: CLINIC | Age: 80
End: 2020-12-14

## 2020-12-18 DIAGNOSIS — I25.10 CAD, MULTIPLE VESSEL: Primary | ICD-10-CM

## 2020-12-18 RX ORDER — AMLODIPINE BESYLATE 5 MG/1
5 TABLET ORAL DAILY
Qty: 30 TABLET | Refills: 5 | Status: SHIPPED | OUTPATIENT
Start: 2020-12-18 | End: 2021-01-06 | Stop reason: SDUPTHER

## 2020-12-29 ENCOUNTER — TELEPHONE (OUTPATIENT)
Dept: CARDIOLOGY CLINIC | Facility: CLINIC | Age: 80
End: 2020-12-29

## 2021-01-06 DIAGNOSIS — I25.10 CAD, MULTIPLE VESSEL: ICD-10-CM

## 2021-01-06 RX ORDER — AMLODIPINE BESYLATE 5 MG/1
5 TABLET ORAL DAILY
Qty: 90 TABLET | Refills: 3 | Status: SHIPPED | OUTPATIENT
Start: 2021-01-06 | End: 2021-04-30

## 2021-01-07 DIAGNOSIS — Z11.59 SCREENING FOR VIRAL DISEASE: ICD-10-CM

## 2021-01-07 PROCEDURE — U0005 INFEC AGEN DETEC AMPLI PROBE: HCPCS | Performed by: INTERNAL MEDICINE

## 2021-01-07 PROCEDURE — U0003 INFECTIOUS AGENT DETECTION BY NUCLEIC ACID (DNA OR RNA); SEVERE ACUTE RESPIRATORY SYNDROME CORONAVIRUS 2 (SARS-COV-2) (CORONAVIRUS DISEASE [COVID-19]), AMPLIFIED PROBE TECHNIQUE, MAKING USE OF HIGH THROUGHPUT TECHNOLOGIES AS DESCRIBED BY CMS-2020-01-R: HCPCS | Performed by: INTERNAL MEDICINE

## 2021-01-08 LAB — SARS-COV-2 RNA SPEC QL NAA+PROBE: NOT DETECTED

## 2021-01-11 NOTE — PRE-PROCEDURE INSTRUCTIONS
Pre-Surgery Instructions:   Medication Instructions    amLODIPine (NORVASC) 5 mg tablet Instructed patient per Anesthesia Guidelines   aspirin 81 MG tablet Patient was instructed by Physician and understands   atorvastatin (LIPITOR) 10 mg tablet Patient was instructed by Physician and understands   Folate-B12-Intrinsic Factor (INTRINSI G23-MTCAZL) 267-312-45 MCG-MCG-MG TABS Patient was instructed by Physician and understands   ibuprofen (ADVIL) 200 mg tablet Patient was instructed by Physician and understands   Multiple Vitamins-Minerals (MULTI-VITAMIN/MINERALS PO) Patient was instructed by Physician and understands   Omega-3 Fatty Acids (OMEGA-3 FISH OIL) 1000 MG CAPS Patient was instructed by Physician and understands   VITAMIN D PO Patient was instructed by Physician and understands

## 2021-01-13 ENCOUNTER — ANESTHESIA EVENT (OUTPATIENT)
Dept: GASTROENTEROLOGY | Facility: AMBULARY SURGERY CENTER | Age: 81
End: 2021-01-13

## 2021-01-13 ENCOUNTER — ANESTHESIA (OUTPATIENT)
Dept: GASTROENTEROLOGY | Facility: AMBULARY SURGERY CENTER | Age: 81
End: 2021-01-13

## 2021-01-13 ENCOUNTER — HOSPITAL ENCOUNTER (OUTPATIENT)
Dept: GASTROENTEROLOGY | Facility: AMBULARY SURGERY CENTER | Age: 81
Setting detail: OUTPATIENT SURGERY
Discharge: HOME | End: 2021-01-13
Attending: INTERNAL MEDICINE
Payer: COMMERCIAL

## 2021-01-13 VITALS
HEART RATE: 86 BPM | OXYGEN SATURATION: 97 % | HEIGHT: 67 IN | BODY MASS INDEX: 25.11 KG/M2 | SYSTOLIC BLOOD PRESSURE: 115 MMHG | RESPIRATION RATE: 20 BRPM | WEIGHT: 160 LBS | TEMPERATURE: 95.9 F | DIASTOLIC BLOOD PRESSURE: 63 MMHG

## 2021-01-13 VITALS — HEART RATE: 82 BPM

## 2021-01-13 DIAGNOSIS — Z86.010 HX OF COLONIC POLYPS: ICD-10-CM

## 2021-01-13 PROCEDURE — G0121 COLON CA SCRN NOT HI RSK IND: HCPCS | Performed by: INTERNAL MEDICINE

## 2021-01-13 RX ORDER — PROPOFOL 10 MG/ML
INJECTION, EMULSION INTRAVENOUS AS NEEDED
Status: DISCONTINUED | OUTPATIENT
Start: 2021-01-13 | End: 2021-01-13

## 2021-01-13 RX ORDER — SODIUM CHLORIDE, SODIUM LACTATE, POTASSIUM CHLORIDE, CALCIUM CHLORIDE 600; 310; 30; 20 MG/100ML; MG/100ML; MG/100ML; MG/100ML
125 INJECTION, SOLUTION INTRAVENOUS CONTINUOUS
Status: DISCONTINUED | OUTPATIENT
Start: 2021-01-13 | End: 2021-01-17 | Stop reason: HOSPADM

## 2021-01-13 RX ORDER — PROPOFOL 10 MG/ML
INJECTION, EMULSION INTRAVENOUS CONTINUOUS PRN
Status: DISCONTINUED | OUTPATIENT
Start: 2021-01-13 | End: 2021-01-13

## 2021-01-13 RX ADMIN — SODIUM CHLORIDE, SODIUM LACTATE, POTASSIUM CHLORIDE, AND CALCIUM CHLORIDE 125 ML/HR: .6; .31; .03; .02 INJECTION, SOLUTION INTRAVENOUS at 08:01

## 2021-01-13 RX ADMIN — PROPOFOL 80 MG: 10 INJECTION, EMULSION INTRAVENOUS at 08:09

## 2021-01-13 RX ADMIN — LIDOCAINE HYDROCHLORIDE 40 MG: 20 INJECTION, SOLUTION INTRAVENOUS at 08:09

## 2021-01-13 RX ADMIN — PROPOFOL 100 MCG/KG/MIN: 10 INJECTION, EMULSION INTRAVENOUS at 08:09

## 2021-01-13 NOTE — H&P
History and Physical - [facility]  Anum Rebollar 79 y o  male MRN: [id number]        HPI:  67-year-old male with history of colon polyps  Regular bowel movements  Historical Information   Past Medical History:   Diagnosis Date    Benign essential hypertension     CAD (coronary artery disease)     Cardiac disorder     Hearing problem     Hx of CABG     Hypercholesteremia      Past Surgical History:   Procedure Laterality Date    CORONARY ARTERY BYPASS GRAFT      triple    HERNIA REPAIR Bilateral     ingunal     KNEE SURGERY Right      Social History   Social History     Substance and Sexual Activity   Alcohol Use Not Currently    Comment: quit      Social History     Substance and Sexual Activity   Drug Use Never     Social History     Tobacco Use   Smoking Status Former Smoker    Quit date:     Years since quittin 0   Smokeless Tobacco Never Used     Family History   Problem Relation Age of Onset    Hypertension Mother     Coronary artery disease Father     Stroke Father     Aneurysm Sister         abdo aorta       Meds/Allergies     (Not in a hospital admission)      Allergies   Allergen Reactions    Acetaminophen Throat Swelling    Amoxicillin Rash       Objective     Blood pressure 145/66, pulse 79, temperature (!) 95 9 °F (35 5 °C), temperature source Tympanic, resp  rate 18, height 5' 6 5" (1 689 m), weight 72 6 kg (160 lb), SpO2 98 %      PHYSICAL EXAM:    Gen: NAD  CV: S1 & S2 normal, RRR  CHEST: Clear to auscultate  ABD: soft, NT/ND, good bowel sounds  EXT: no edema    ASSESSMENT:     History of colon polyps    PLAN:    Colonoscopy

## 2021-01-13 NOTE — ANESTHESIA POSTPROCEDURE EVALUATION
Post-Op Assessment Note    CV Status:  Stable  Pain Score: 0    Pain management: adequate     Mental Status:  Sleepy   Hydration Status:  Stable and euvolemic   PONV Controlled:  None   Airway Patency:  Patent       Staff: CRNA         No complications documented      BP  145/86    Temp 36 0C   Pulse 89   Resp 20   SpO2 99%RA

## 2021-01-27 DIAGNOSIS — Z23 ENCOUNTER FOR IMMUNIZATION: ICD-10-CM

## 2021-01-29 NOTE — PRE-PROCEDURE INSTRUCTIONS
Pre-Surgery Instructions:   Medication Instructions    amLODIPine (NORVASC) 5 mg tablet Pt to take the am of the surgery   aspirin 81 MG tablet Instructed patient per Anesthesia Guidelines   atorvastatin (LIPITOR) 10 mg tablet Instructed patient per Anesthesia Guidelines   B Complex Vitamins (VITAMIN B COMPLEX PO) Instructed patient per Anesthesia Guidelines   ibuprofen (ADVIL) 200 mg tablet Instructed patient per Anesthesia Guidelines   Multiple Vitamins-Minerals (MULTI-VITAMIN/MINERALS PO) Instructed patient per Anesthesia Guidelines   Omega-3 Fatty Acids (OMEGA-3 FISH OIL) 1000 MG CAPS Instructed patient per Anesthesia Guidelines   VITAMIN D PO Instructed patient per Anesthesia Guidelines  Pre op instructions given  Pt to have the covid test on 2/2 or 2/3/21 at the [facility]   [facility]    The following information was developed to assist you to prepare for your operation  What do I need to do before coming to the hospital?   Arrange for a responsible person to drive you to and from the hospital    Arrange care for your children at home  Children are not allowed in the recovery areas of the hospital   Plan to wear clothing that is easy to put on and take off  If you are having shoulder surgery, wear a shirt that buttons or zippers in the front  Bathing  o Shower the evening before and the morning of your surgery with an antibacterial soap  Please refer to the Pre Op Showering Instructions for Surgery Patients Sheet   o Remove nail polish and all body piercing jewelry  o Do not shave any body part for at least 24 hours before surgery-this includes face, arms, legs and upper body  Food  o Nothing to eat or drink after midnight the night before your surgery   This includes candy and chewing gum  o Exception: If your surgery is after 12:00pm (noon), you may have clear liquids such as 7-Up®, ginger ale, apple or cranberry juice, Jell-O®, water, or clear broth until 8:00 am  o Do not drink milk or juice with pulp on the morning before surgery  o Do not drink alcohol 24 hours before surgery  Medicine  o Follow instructions you received from your surgeon about which medicines you may take on the day of surgery  o If instructed to take medicine on the morning of surgery, take pills with just a small sip of water  Call your prescribing doctor for specific infroamtion on what to do if you take insulin    What should I bring to the hospital?    Bring:  Magalys Sotelo or a walker, if you have them, for foot or knee surgery   A list of the daily medicines, vitamins, minerals, herbals and nutritional supplements you take  Include the dosages of medicines and the time you take them each day   Glasses, dentures or hearing aids   Minimal clothing; you will be wearing hospital sleepwear   Photo ID; required to verify your identity   If you have a Living Will or Power of , bring a copy of the documents   If you have an ostomy, bring an extra pouch and any supplies you use    Do not bring   Medicines or inhalers   Money, valuables or jewelry    What other information should I know about the day of surgery?  Notify your surgeons if you develop a cold, sore throat, cough, fever, rash or any other illness   Report to the Ambulatory Surgical/Same Day Surgery Unit   You will be instructed to stop at Registration only if you have not been pre-registered   Inform your  fi they do not stay that they will be asked by the staff to leave a phone number where they can be reached   Be available to be reached before surgery  In the event the operating room schedule changes, you may be asked to come in earlier or later than expected    *It is important to tell your doctor and others involved in your health care if you are taking or have been taking any non-prescription drugs, vitamins, minerals, herbals or other nutritional supplements   Any of these may interact with some food or medicines and cause a reaction

## 2021-02-03 DIAGNOSIS — Z20.822 COVID-19 RULED OUT BY LABORATORY TESTING: ICD-10-CM

## 2021-02-03 PROCEDURE — U0005 INFEC AGEN DETEC AMPLI PROBE: HCPCS | Performed by: OPHTHALMOLOGY

## 2021-02-03 PROCEDURE — U0003 INFECTIOUS AGENT DETECTION BY NUCLEIC ACID (DNA OR RNA); SEVERE ACUTE RESPIRATORY SYNDROME CORONAVIRUS 2 (SARS-COV-2) (CORONAVIRUS DISEASE [COVID-19]), AMPLIFIED PROBE TECHNIQUE, MAKING USE OF HIGH THROUGHPUT TECHNOLOGIES AS DESCRIBED BY CMS-2020-01-R: HCPCS | Performed by: OPHTHALMOLOGY

## 2021-02-04 LAB — SARS-COV-2 RNA RESP QL NAA+PROBE: NEGATIVE

## 2021-02-07 ENCOUNTER — ANESTHESIA EVENT (OUTPATIENT)
Dept: PERIOP | Facility: AMBULARY SURGERY CENTER | Age: 81
End: 2021-02-07
Payer: COMMERCIAL

## 2021-02-08 ENCOUNTER — ANESTHESIA (OUTPATIENT)
Dept: PERIOP | Facility: AMBULARY SURGERY CENTER | Age: 81
End: 2021-02-08
Payer: COMMERCIAL

## 2021-02-08 ENCOUNTER — HOSPITAL ENCOUNTER (OUTPATIENT)
Facility: AMBULARY SURGERY CENTER | Age: 81
Setting detail: OUTPATIENT SURGERY
Discharge: HOME | End: 2021-02-08
Attending: OPHTHALMOLOGY | Admitting: OPHTHALMOLOGY
Payer: COMMERCIAL

## 2021-02-08 VITALS
OXYGEN SATURATION: 98 % | RESPIRATION RATE: 16 BRPM | HEART RATE: 67 BPM | BODY MASS INDEX: 25.11 KG/M2 | WEIGHT: 160 LBS | DIASTOLIC BLOOD PRESSURE: 64 MMHG | TEMPERATURE: 96.5 F | SYSTOLIC BLOOD PRESSURE: 134 MMHG | HEIGHT: 67 IN

## 2021-02-08 VITALS — HEART RATE: 59 BPM

## 2021-02-08 DIAGNOSIS — H25.012 CORTICAL AGE-RELATED CATARACT OF LEFT EYE: ICD-10-CM

## 2021-02-08 DIAGNOSIS — Z20.822 COVID-19 RULED OUT BY LABORATORY TESTING: Primary | ICD-10-CM

## 2021-02-08 PROCEDURE — V2632 POST CHMBR INTRAOCULAR LENS: HCPCS | Performed by: OPHTHALMOLOGY

## 2021-02-08 DEVICE — ACRYSOF(R) IQ ASPHERIC NATURAL IOL, SINGLE-PIECE ACRYLIC FOLDABLE PCL, UV WITH BLUE LIGHTFILTER, 13.0MM LENGTH, 6.0MM ANTERIORASYMMETRIC BICONVEX OPTIC, PLANAR HAPTICS.
Type: IMPLANTABLE DEVICE | Status: FUNCTIONAL
Brand: ACRYSOF®

## 2021-02-08 RX ORDER — MIDAZOLAM HYDROCHLORIDE 2 MG/2ML
INJECTION, SOLUTION INTRAMUSCULAR; INTRAVENOUS AS NEEDED
Status: DISCONTINUED | OUTPATIENT
Start: 2021-02-08 | End: 2021-02-08

## 2021-02-08 RX ORDER — TETRACAINE HYDROCHLORIDE 5 MG/ML
1 SOLUTION OPHTHALMIC ONCE
Status: COMPLETED | OUTPATIENT
Start: 2021-02-08 | End: 2021-02-08

## 2021-02-08 RX ORDER — GATIFLOXACIN 5 MG/ML
SOLUTION/ DROPS OPHTHALMIC AS NEEDED
Status: DISCONTINUED | OUTPATIENT
Start: 2021-02-08 | End: 2021-02-08 | Stop reason: HOSPADM

## 2021-02-08 RX ORDER — LIDOCAINE HYDROCHLORIDE 10 MG/ML
INJECTION, SOLUTION EPIDURAL; INFILTRATION; INTRACAUDAL; PERINEURAL AS NEEDED
Status: DISCONTINUED | OUTPATIENT
Start: 2021-02-08 | End: 2021-02-08 | Stop reason: HOSPADM

## 2021-02-08 RX ORDER — BALANCED SALT SOLUTION 6.4; .75; .48; .3; 3.9; 1.7 MG/ML; MG/ML; MG/ML; MG/ML; MG/ML; MG/ML
SOLUTION OPHTHALMIC AS NEEDED
Status: DISCONTINUED | OUTPATIENT
Start: 2021-02-08 | End: 2021-02-08 | Stop reason: HOSPADM

## 2021-02-08 RX ORDER — PHENYLEPHRINE HCL 2.5 %
1 DROPS OPHTHALMIC (EYE)
Status: COMPLETED | OUTPATIENT
Start: 2021-02-08 | End: 2021-02-08

## 2021-02-08 RX ORDER — TETRACAINE HYDROCHLORIDE 5 MG/ML
SOLUTION OPHTHALMIC AS NEEDED
Status: DISCONTINUED | OUTPATIENT
Start: 2021-02-08 | End: 2021-02-08 | Stop reason: HOSPADM

## 2021-02-08 RX ORDER — KETOROLAC TROMETHAMINE 5 MG/ML
1 SOLUTION OPHTHALMIC 4 TIMES DAILY
Qty: 5 ML | Refills: 0
Start: 2021-02-08 | End: 2021-03-01

## 2021-02-08 RX ORDER — KETOROLAC TROMETHAMINE 5 MG/ML
1 SOLUTION OPHTHALMIC
Status: COMPLETED | OUTPATIENT
Start: 2021-02-08 | End: 2021-02-08

## 2021-02-08 RX ORDER — LIDOCAINE HYDROCHLORIDE 20 MG/ML
1 JELLY TOPICAL
Status: COMPLETED | OUTPATIENT
Start: 2021-02-08 | End: 2021-02-08

## 2021-02-08 RX ORDER — CYCLOPENTOLATE HYDROCHLORIDE 10 MG/ML
1 SOLUTION/ DROPS OPHTHALMIC
Status: COMPLETED | OUTPATIENT
Start: 2021-02-08 | End: 2021-02-08

## 2021-02-08 RX ORDER — GATIFLOXACIN 5 MG/ML
1 SOLUTION/ DROPS OPHTHALMIC 2 TIMES DAILY
Qty: 3 ML | Refills: 0
Start: 2021-02-08 | End: 2021-03-01

## 2021-02-08 RX ADMIN — LIDOCAINE HYDROCHLORIDE 1 APPLICATION: 20 JELLY TOPICAL at 07:30

## 2021-02-08 RX ADMIN — KETOROLAC TROMETHAMINE 1 DROP: 5 SOLUTION OPHTHALMIC at 08:00

## 2021-02-08 RX ADMIN — CYCLOPENTOLATE HYDROCHLORIDE 1 DROP: 10 SOLUTION/ DROPS OPHTHALMIC at 08:15

## 2021-02-08 RX ADMIN — MIDAZOLAM 2 MG: 1 INJECTION INTRAMUSCULAR; INTRAVENOUS at 08:20

## 2021-02-08 RX ADMIN — PHENYLEPHRINE HYDROCHLORIDE 1 DROP: 25 SOLUTION/ DROPS OPHTHALMIC at 07:45

## 2021-02-08 RX ADMIN — LIDOCAINE HYDROCHLORIDE 1 APPLICATION: 20 JELLY TOPICAL at 07:45

## 2021-02-08 RX ADMIN — LIDOCAINE HYDROCHLORIDE 1 APPLICATION: 20 JELLY TOPICAL at 08:00

## 2021-02-08 RX ADMIN — KETOROLAC TROMETHAMINE 1 DROP: 5 SOLUTION OPHTHALMIC at 07:30

## 2021-02-08 RX ADMIN — KETOROLAC TROMETHAMINE 1 DROP: 5 SOLUTION OPHTHALMIC at 07:45

## 2021-02-08 RX ADMIN — KETOROLAC TROMETHAMINE 1 DROP: 5 SOLUTION OPHTHALMIC at 08:15

## 2021-02-08 RX ADMIN — PHENYLEPHRINE HYDROCHLORIDE 1 DROP: 25 SOLUTION/ DROPS OPHTHALMIC at 08:00

## 2021-02-08 RX ADMIN — CYCLOPENTOLATE HYDROCHLORIDE 1 DROP: 10 SOLUTION/ DROPS OPHTHALMIC at 07:30

## 2021-02-08 RX ADMIN — PHENYLEPHRINE HYDROCHLORIDE 1 DROP: 25 SOLUTION/ DROPS OPHTHALMIC at 08:15

## 2021-02-08 RX ADMIN — TETRACAINE HYDROCHLORIDE 1 DROP: 5 SOLUTION OPHTHALMIC at 07:30

## 2021-02-08 RX ADMIN — PHENYLEPHRINE HYDROCHLORIDE 1 DROP: 25 SOLUTION/ DROPS OPHTHALMIC at 07:30

## 2021-02-08 RX ADMIN — CYCLOPENTOLATE HYDROCHLORIDE 1 DROP: 10 SOLUTION/ DROPS OPHTHALMIC at 08:00

## 2021-02-08 RX ADMIN — CYCLOPENTOLATE HYDROCHLORIDE 1 DROP: 10 SOLUTION/ DROPS OPHTHALMIC at 07:45

## 2021-02-08 NOTE — ANESTHESIA PREPROCEDURE EVALUATION
Procedure:  EXTRACTION EXTRACAPSULAR CATARACT PHACO INTRAOCULAR LENS (IOL) (Left Eye)    Relevant Problems   CARDIO   (+) CAD, multiple vessel   (+) Hyperlipidemia   (+) S/P CABG x 4        Physical Exam    Airway    Mallampati score: II  TM Distance: >3 FB  Neck ROM: full     Dental   No notable dental hx     Cardiovascular  Rhythm: regular, Rate: normal, Cardiovascular exam normal    Pulmonary  Breath sounds clear to auscultation,     Other Findings        Anesthesia Plan  ASA Score- 2     Anesthesia Type- IV sedation with anesthesia with ASA Monitors  Additional Monitors:   Airway Plan:           Plan Factors-Exercise tolerance (METS): >4 METS  Chart reviewed  EKG reviewed  Patient summary reviewed  Patient is not a current smoker  Induction-     Postoperative Plan- Plan for postoperative opioid use  Informed Consent- Anesthetic plan and risks discussed with patient  I personally reviewed this patient with the CRNA  Discussed and agreed on the Anesthesia Plan with the CRNA  Anu Gustafson

## 2021-02-08 NOTE — ANESTHESIA POSTPROCEDURE EVALUATION
Post-Op Assessment Note    CV Status:  Stable  Pain Score: 0    Pain management: adequate     Mental Status:  Alert and awake   Hydration Status:  Stable   PONV Controlled:  None   Airway Patency:  Patent and adequate      Post Op Vitals Reviewed: Yes      Staff: CRNA         No complications documented      BP      Temp     Pulse     Resp      SpO2

## 2021-02-08 NOTE — DISCHARGE INSTRUCTIONS
Dr Beatriz Cross Cataract Instructions    Activity:     1  No Driving until instructed   2  Keep shield on until seen tomorrow except when administering drops   3  No heavy lifting   4  No water in eye     Diet:     1  Resume normal diet    Normal Symptoms:     1  Mild Headache   2  Scratchy or picky feeling around eye    Call the office if:     1  You have any questions or concerns   2  If eye pain is not relieved by extra strength tylenol    Office phone number:  [phone]      Next appointment:     1  See Dr Beatriz Cross at his office tomorrow as scheduled   __________________________________________________________   2  Bring blue eye kit with you and eyedrops to the office    A new set of comprehensive instructions will be given and reviewed with you during your office visit tomorrow

## 2021-03-01 ENCOUNTER — OFFICE VISIT (OUTPATIENT)
Dept: FAMILY MEDICINE CLINIC | Facility: CLINIC | Age: 81
End: 2021-03-01
Payer: COMMERCIAL

## 2021-03-01 ENCOUNTER — APPOINTMENT (OUTPATIENT)
Dept: RADIOLOGY | Facility: CLINIC | Age: 81
End: 2021-03-01
Payer: COMMERCIAL

## 2021-03-01 VITALS
HEART RATE: 76 BPM | RESPIRATION RATE: 16 BRPM | BODY MASS INDEX: 25.43 KG/M2 | WEIGHT: 162 LBS | DIASTOLIC BLOOD PRESSURE: 62 MMHG | TEMPERATURE: 97.3 F | OXYGEN SATURATION: 99 % | SYSTOLIC BLOOD PRESSURE: 108 MMHG | HEIGHT: 67 IN

## 2021-03-01 DIAGNOSIS — E78.2 MIXED HYPERLIPIDEMIA: ICD-10-CM

## 2021-03-01 DIAGNOSIS — I25.119 CORONARY ARTERY DISEASE INVOLVING NATIVE CORONARY ARTERY OF NATIVE HEART WITH ANGINA PECTORIS (HCC): ICD-10-CM

## 2021-03-01 DIAGNOSIS — M54.41 ACUTE RIGHT-SIDED LOW BACK PAIN WITH RIGHT-SIDED SCIATICA: ICD-10-CM

## 2021-03-01 DIAGNOSIS — M54.41 ACUTE RIGHT-SIDED LOW BACK PAIN WITH RIGHT-SIDED SCIATICA: Primary | ICD-10-CM

## 2021-03-01 DIAGNOSIS — R10.31 RIGHT GROIN PAIN: ICD-10-CM

## 2021-03-01 DIAGNOSIS — I10 ESSENTIAL HYPERTENSION: ICD-10-CM

## 2021-03-01 PROCEDURE — 99214 OFFICE O/P EST MOD 30 MIN: CPT | Performed by: FAMILY MEDICINE

## 2021-03-01 PROCEDURE — 1160F RVW MEDS BY RX/DR IN RCRD: CPT | Performed by: FAMILY MEDICINE

## 2021-03-01 PROCEDURE — 1036F TOBACCO NON-USER: CPT | Performed by: FAMILY MEDICINE

## 2021-03-01 PROCEDURE — 72110 X-RAY EXAM L-2 SPINE 4/>VWS: CPT

## 2021-03-01 RX ORDER — CYCLOBENZAPRINE HCL 5 MG
5 TABLET ORAL
Qty: 10 TABLET | Refills: 0 | Status: SHIPPED | OUTPATIENT
Start: 2021-03-01 | End: 2021-06-28

## 2021-03-01 NOTE — PATIENT INSTRUCTIONS
Lower Back Exercises   WHAT YOU NEED TO KNOW:   What do I need to know about lower back exercises? Lower back exercises help heal and strengthen your back muscles to prevent another injury  Ask your healthcare provider if you need to see a physical therapist for more advanced exercises  · Do the exercises on a mat or firm surface  (not on a bed) to support your spine and prevent low back pain  · Move slowly and smoothly  Avoid fast or jerky motions  · Breathe normally  Do not hold your breath  · Stop if you feel pain  It is normal to feel some discomfort at first  Regular exercise will help decrease your discomfort over time  How do I perform lower back exercises safely? Your healthcare provider may recommend that you do back exercises 10 to 30 minutes each day  He may also recommend that you do exercises 1 to 3 times each day  Ask your healthcare provider which exercises are best for you and how often to do them  · Ankle pumps:  Lie on your back  Move your foot up (with your toes pointing toward your head)  Then, move your foot down (with your toes pointing away from you)  Repeat this exercise 10 times on each side  · Heel slides:  Lie on your back  Slowly bend one leg and then straighten it  Next, bend the other leg and then straighten it  Repeat 10 times on each side  · Pelvic tilt:  Lie on your back with your knees bent and feet flat on the floor  Place your arms in a relaxed position beside your body  Tighten the muscles of your abdomen and flatten your back against the floor  Hold for 5 seconds  Repeat 5 times  · Back stretch:  Lie on your back with your hands behind your head  Bend your knees and turn the lower half of your body to one side  Hold this position for 10 seconds  Repeat 3 times on each side  · Straight leg raises:  Lie on your back with one leg straight  Bend the other knee   Tighten your abdomen and then slowly lift the straight leg up about 6 to 12 inches off the floor  Hold for 1 to 5 seconds  Lower your leg slowly  Repeat 10 times on each leg  · Knee-to-chest:  Lie on your back with your knees bent and feet flat on the floor  Pull one of your knees toward your chest and hold it there for 5 seconds  Return your leg to the starting position  Lift the other knee toward your chest and hold for 5 seconds  Do this 5 times on each side  · Cat and camel:  Place your hands and knees on the floor  Arch your back upward toward the ceiling and lower your head  Round out your spine as much as you can  Hold for 5 seconds  Lift your head upward and push your chest downward toward the floor  Hold for 5 seconds  Do 3 sets or as directed  · Wall squats:  Stand with your back against a wall  Tighten the muscles of your abdomen  Slowly lower your body until your knees are bent at a 45 degree angle  Hold this position for 5 seconds  Slowly move back up to a standing position  Repeat 10 times  · Curl up:  Lie on your back with your knees bent and feet flat on the floor  Place your hands, palms down, underneath the curve in your lower back  Next, with your elbows on the floor, lift your shoulders and chest 2 to 3 inches  Keep your head in line with your shoulders  Hold this position for 5 seconds  When you can do this exercise without pain for 10 to 15 seconds, you may add a rotation  While your shoulders and chest are lifted off the ground, turn slightly to the left and hold  Repeat on the other side  · Bird dog:  Place your hands and knees on the floor  Keep your wrists directly below your shoulders and your knees directly below your hips  Pull your belly button in toward your spine  Do not flatten or arch your back  Tighten your abdominal muscles  Raise one arm straight out so that it is aligned with your head  Next, raise the leg opposite your arm  Hold this position for 15 seconds  Lower your arm and leg slowly and change sides  Do 5 sets  When should I seek immediate care? · You have severe pain that prevents you from moving  When should I contact my healthcare provider? · Your pain becomes worse  · You have new pain  · You have questions or concerns about your condition or care  CARE AGREEMENT:   You have the right to help plan your care  Learn about your health condition and how it may be treated  Discuss treatment options with your healthcare providers to decide what care you want to receive  You always have the right to refuse treatment  The above information is an  only  It is not intended as medical advice for individual conditions or treatments  Talk to your doctor, nurse or pharmacist before following any medical regimen to see if it is safe and effective for you  © Copyright [address] Information is for End User's use only and may not be sold, redistributed or otherwise used for commercial purposes   All illustrations and images included in CareNotes® are the copyrighted property of [employer]  or [address] [place] [address] [facility]

## 2021-03-01 NOTE — PROGRESS NOTES
William Salazar 1940 male MRN: [id number]    FAMILY PRACTICE OFFICE VISIT  [facility] - [address]      ASSESSMENT/PLAN  William Salazar is a 79 y o  male presents to the office for    1  Acute right-sided low back pain with right-sided sciatica    Sent for an x-ray of his lower back  Likely degenerative disease  I do believe that the patient has muscle spasms  Would like to avoid NSAIDs given his his history of coronary artery disease  Recommend Flexril as needed at bedtime  Hot compresses 10 mins twice a day  - XR spine lumbar minimum 4 views non injury; Future  - XR spine lumbar minimum 4 views non injury; Future  - cyclobenzaprine (FLEXERIL) 5 mg tablet; Take 1 tablet (5 mg total) by mouth daily at bedtime as needed for muscle spasms  Dispense: 10 tablet; Refill: 0    2  Coronary artery disease involving native coronary artery of native heart with angina pectoris ([address] )  History, seeing Cardiology  Recommend avoiding NSAIDs    3  Mixed hyperlipidemia  Continue on Lipitor 10mg     4  Essential hypertension  Controlled, continue medications as prescribed by cardiology    5  Right groin pain  Mild hernia present, no alarming symptoms today  Recommend monitoring if pain is persistent will need Surgeon evaluation  Future Appointments   Date Time Provider Armen Sherri   3/2/2021 11:00  formerly [facility]   12/13/2021  9:00 AM Carmella Wyatt MD [facility]          SUBJECTIVE  CC: Back Pain (pt here with right sided low back paib radiating down the leg since Friday, was shoveling snow)      HPI:  William Salazar is a 79 y o  male who presents for an acute appointment  Since Friday patient was shoveling snow and then realized that his lower back was causing more more discomfort  He was having sciatic pain that radiates all the way down to his ankle  States he has never had this severe    Denies any history of degenerative disease  Patient at 1st thought it was his hernia  Continues to see his cardiologist for his coronary artery disease  Has not taken any medications for pain relief  Taking his hyperlipidemia medications and blood pressure medications as prescribed without any difficulties  Review of Systems   Constitutional: Negative for activity change, appetite change, chills, fatigue and fever  HENT: Negative for congestion  Respiratory: Negative for cough, chest tightness and shortness of breath  Cardiovascular: Negative for chest pain and leg swelling  Gastrointestinal: Negative for abdominal distention, abdominal pain, constipation, diarrhea, nausea and vomiting  Musculoskeletal: Positive for arthralgias and back pain  All other systems reviewed and are negative        Historical Information   The patient history was reviewed as follows:  Past Medical History:   Diagnosis Date    Arthritis     Benign essential hypertension     Coronary artery disease     Hearing problem     Hx of CABG     Hypercholesteremia     Wears glasses     as needed    Wears hearing aid     bilateral aids         Medications:     Current Outpatient Medications:     amLODIPine (NORVASC) 5 mg tablet, Take 1 tablet (5 mg total) by mouth daily (Patient taking differently: Take 5 mg by mouth every morning ), Disp: 90 tablet, Rfl: 3    aspirin 81 MG tablet, Take 1 tablet by mouth daily at bedtime , Disp: , Rfl:     atorvastatin (LIPITOR) 10 mg tablet, TAKE 1 TABLET BY MOUTH  DAILY AS DIRECTED (Patient taking differently: 10 mg daily at bedtime ), Disp: 90 tablet, Rfl: 3    B Complex Vitamins (VITAMIN B COMPLEX PO), Take by mouth every morning, Disp: , Rfl:     ibuprofen (ADVIL) 200 mg tablet, Take 1 tablet by mouth 3 (three) times a day as needed, Disp: , Rfl:     Multiple Vitamins-Minerals (MULTI-VITAMIN/MINERALS PO), Take 1 tablet by mouth every morning Powder[employer]- one scoop in H2O, Disp: , Rfl:   Omega-3 Fatty Acids (OMEGA-3 FISH OIL) 1000 MG CAPS, Take 1 capsule by mouth 2 (two) times a day, Disp: , Rfl:     VITAMIN D PO, Take 2,000 Units by mouth every morning , Disp: , Rfl:     cyclobenzaprine (FLEXERIL) 5 mg tablet, Take 1 tablet (5 mg total) by mouth daily at bedtime as needed for muscle spasms, Disp: 10 tablet, Rfl: 0    Allergies   Allergen Reactions    Acetaminophen Throat Swelling    Amoxicillin Rash       OBJECTIVE  Vitals:   Vitals:    03/01/21 1035   BP: 108/62   BP Location: Left arm   Patient Position: Sitting   Cuff Size: Standard   Pulse: 76   Resp: 16   Temp: (!) 97 3 °F (36 3 °C)   TempSrc: Temporal   SpO2: 99%   Weight: 73 5 kg (162 lb)   Height: 5' 6 5" (1 689 m)         Physical Exam  Vitals signs reviewed  Constitutional:       Appearance: He is well-developed  HENT:      Head: Normocephalic and atraumatic  Eyes:      Conjunctiva/sclera: Conjunctivae normal       Pupils: Pupils are equal, round, and reactive to light  Neck:      Musculoskeletal: Normal range of motion and neck supple  Cardiovascular:      Rate and Rhythm: Normal rate and regular rhythm  Heart sounds: Normal heart sounds, S1 normal and S2 normal  No murmur  Pulmonary:      Effort: Pulmonary effort is normal  No respiratory distress  Breath sounds: Normal breath sounds  No wheezing  Abdominal:      Tenderness: Guarding: very mild over right groin but present  Hernia: A hernia is present  Musculoskeletal: Normal range of motion  General: Tenderness (right lower spine, scaitica present with movement of right leg) present  Skin:     General: Skin is warm  Neurological:      Mental Status: He is alert and oriented to person, place, and time  Psychiatric:         Speech: Speech normal          Behavior: Behavior normal          Thought Content:  Thought content normal          Judgment: Judgment normal                     Jono Rodrigez MD,   [facility]  3/1/2021

## 2021-03-02 ENCOUNTER — IMMUNIZATIONS (OUTPATIENT)
Dept: FAMILY MEDICINE CLINIC | Facility: HOSPITAL | Age: 81
End: 2021-03-02

## 2021-03-02 DIAGNOSIS — Z23 ENCOUNTER FOR IMMUNIZATION: Primary | ICD-10-CM

## 2021-03-02 PROCEDURE — 91301 SARS-COV-2 / COVID-19 MRNA VACCINE (MODERNA) 100 MCG: CPT

## 2021-03-02 PROCEDURE — 0011A SARS-COV-2 / COVID-19 MRNA VACCINE (MODERNA) 100 MCG: CPT

## 2021-03-08 ENCOUNTER — TELEPHONE (OUTPATIENT)
Dept: FAMILY MEDICINE CLINIC | Facility: CLINIC | Age: 81
End: 2021-03-08

## 2021-03-08 NOTE — TELEPHONE ENCOUNTER
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 Do we need PT for his sciatic pain

## 2021-03-08 NOTE — PRE-PROCEDURE INSTRUCTIONS
Pre-Surgery Instructions:   Medication Instructions    amLODIPine (NORVASC) 5 mg tablet Pt to take the am of the procedure    aspirin 81 MG tablet Instructed patient per Anesthesia Guidelines   atorvastatin (LIPITOR) 10 mg tablet Instructed patient per Anesthesia Guidelines   B Complex Vitamins (VITAMIN B COMPLEX PO) Instructed patient per Anesthesia Guidelines   cyclobenzaprine (FLEXERIL) 5 mg tablet Instructed patient per Anesthesia Guidelines   ibuprofen (ADVIL) 200 mg tablet Instructed patient per Anesthesia Guidelines   Multiple Vitamins-Minerals (MULTI-VITAMIN/MINERALS PO) Instructed patient per Anesthesia Guidelines   Omega-3 Fatty Acids (OMEGA-3 FISH OIL) 1000 MG CAPS Instructed patient per Anesthesia Guidelines   VITAMIN D PO Instructed patient per Anesthesia Guidelines  Pre op instructions given  Pt to have the covid test on 3/9/21 at the [facility]   daughter Antonio Handler  [phone] Surgical Experience    The following information was developed to assist you to prepare for your operation  What do I need to do before coming to the hospital?   Arrange for a responsible person to drive you to and from the hospital    Arrange care for your children at home  Children are not allowed in the recovery areas of the hospital   Plan to wear clothing that is easy to put on and take off  If you are having shoulder surgery, wear a shirt that buttons or zippers in the front  Bathing  o Shower the evening before and the morning of your surgery with an antibacterial soap  Please refer to the Pre Op Showering Instructions for Surgery Patients Sheet   o Remove nail polish and all body piercing jewelry  o Do not shave any body part for at least 24 hours before surgery-this includes face, arms, legs and upper body  Food  o Nothing to eat or drink after midnight the night before your surgery   This includes candy and chewing gum  o Exception: If your surgery is after 12:00pm (noon), you may have clear liquids such as 7-Up®, ginger ale, apple or cranberry juice, Jell-O®, water, or clear broth until 8:00 am  o Do not drink milk or juice with pulp on the morning before surgery  o Do not drink alcohol 24 hours before surgery  Medicine  o Follow instructions you received from your surgeon about which medicines you may take on the day of surgery  o If instructed to take medicine on the morning of surgery, take pills with just a small sip of water  Call your prescribing doctor for specific infroamtion on what to do if you take insulin    What should I bring to the hospital?    Bring:  Lafrances Asa or a walker, if you have them, for foot or knee surgery   A list of the daily medicines, vitamins, minerals, herbals and nutritional supplements you take  Include the dosages of medicines and the time you take them each day   Glasses, dentures or hearing aids   Minimal clothing; you will be wearing hospital sleepwear   Photo ID; required to verify your identity   If you have a Living Will or Power of , bring a copy of the documents   If you have an ostomy, bring an extra pouch and any supplies you use    Do not bring   Medicines or inhalers   Money, valuables or jewelry    What other information should I know about the day of surgery?  Notify your surgeons if you develop a cold, sore throat, cough, fever, rash or any other illness   Report to the Ambulatory Surgical/Same Day Surgery Unit   You will be instructed to stop at Registration only if you have not been pre-registered   Inform your  fi they do not stay that they will be asked by the staff to leave a phone number where they can be reached   Be available to be reached before surgery   In the event the operating room schedule changes, you may be asked to come in earlier or later than expected    *It is important to tell your doctor and others involved in your health care if you are taking or have been taking any non-prescription drugs, vitamins, minerals, herbals or other nutritional supplements   Any of these may interact with some food or medicines and cause a reaction

## 2021-03-08 NOTE — TELEPHONE ENCOUNTER
PT RETURNED [facility], I ADVISED IF ANYTHING CHANGES TO CALL US

## 2021-03-08 NOTE — TELEPHONE ENCOUNTER
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 Do we need PT for his sciatic pain

## 2021-03-09 DIAGNOSIS — Z20.822 COVID-19 RULED OUT BY LABORATORY TESTING: ICD-10-CM

## 2021-03-09 PROCEDURE — U0003 INFECTIOUS AGENT DETECTION BY NUCLEIC ACID (DNA OR RNA); SEVERE ACUTE RESPIRATORY SYNDROME CORONAVIRUS 2 (SARS-COV-2) (CORONAVIRUS DISEASE [COVID-19]), AMPLIFIED PROBE TECHNIQUE, MAKING USE OF HIGH THROUGHPUT TECHNOLOGIES AS DESCRIBED BY CMS-2020-01-R: HCPCS | Performed by: OPHTHALMOLOGY

## 2021-03-09 PROCEDURE — U0005 INFEC AGEN DETEC AMPLI PROBE: HCPCS | Performed by: OPHTHALMOLOGY

## 2021-03-10 LAB — SARS-COV-2 RNA RESP QL NAA+PROBE: NEGATIVE

## 2021-03-15 ENCOUNTER — ANESTHESIA (OUTPATIENT)
Dept: PERIOP | Facility: AMBULARY SURGERY CENTER | Age: 81
End: 2021-03-15
Payer: COMMERCIAL

## 2021-03-15 ENCOUNTER — ANESTHESIA EVENT (OUTPATIENT)
Dept: PERIOP | Facility: AMBULARY SURGERY CENTER | Age: 81
End: 2021-03-15
Payer: COMMERCIAL

## 2021-03-15 ENCOUNTER — HOSPITAL ENCOUNTER (OUTPATIENT)
Facility: AMBULARY SURGERY CENTER | Age: 81
Setting detail: OUTPATIENT SURGERY
Discharge: HOME | End: 2021-03-15
Attending: OPHTHALMOLOGY | Admitting: OPHTHALMOLOGY
Payer: COMMERCIAL

## 2021-03-15 VITALS
HEIGHT: 67 IN | BODY MASS INDEX: 25.11 KG/M2 | RESPIRATION RATE: 18 BRPM | DIASTOLIC BLOOD PRESSURE: 70 MMHG | OXYGEN SATURATION: 98 % | WEIGHT: 160 LBS | TEMPERATURE: 97 F | HEART RATE: 66 BPM | SYSTOLIC BLOOD PRESSURE: 152 MMHG

## 2021-03-15 DIAGNOSIS — H25.11 AGE-RELATED NUCLEAR CATARACT OF RIGHT EYE: ICD-10-CM

## 2021-03-15 DIAGNOSIS — Z20.822 COVID-19 RULED OUT BY LABORATORY TESTING: Primary | ICD-10-CM

## 2021-03-15 PROCEDURE — V2632 POST CHMBR INTRAOCULAR LENS: HCPCS | Performed by: OPHTHALMOLOGY

## 2021-03-15 DEVICE — ACRYSOF(R) IQ ASPHERIC NATURAL IOL, SINGLE-PIECE ACRYLIC FOLDABLE PCL, UV WITH BLUE LIGHTFILTER, 13.0MM LENGTH, 6.0MM ANTERIORASYMMETRIC BICONVEX OPTIC, PLANAR HAPTICS.
Type: IMPLANTABLE DEVICE | Status: FUNCTIONAL
Brand: ACRYSOF®

## 2021-03-15 RX ORDER — TETRACAINE HYDROCHLORIDE 5 MG/ML
1 SOLUTION OPHTHALMIC ONCE
Status: COMPLETED | OUTPATIENT
Start: 2021-03-15 | End: 2021-03-15

## 2021-03-15 RX ORDER — LIDOCAINE HYDROCHLORIDE 10 MG/ML
INJECTION, SOLUTION EPIDURAL; INFILTRATION; INTRACAUDAL; PERINEURAL AS NEEDED
Status: DISCONTINUED | OUTPATIENT
Start: 2021-03-15 | End: 2021-03-15 | Stop reason: HOSPADM

## 2021-03-15 RX ORDER — GATIFLOXACIN 5 MG/ML
1 SOLUTION/ DROPS OPHTHALMIC 2 TIMES DAILY
Qty: 3 ML | Refills: 0
Start: 2021-03-15 | End: 2021-04-30 | Stop reason: ALTCHOICE

## 2021-03-15 RX ORDER — BALANCED SALT SOLUTION 6.4; .75; .48; .3; 3.9; 1.7 MG/ML; MG/ML; MG/ML; MG/ML; MG/ML; MG/ML
SOLUTION OPHTHALMIC AS NEEDED
Status: DISCONTINUED | OUTPATIENT
Start: 2021-03-15 | End: 2021-03-15 | Stop reason: HOSPADM

## 2021-03-15 RX ORDER — LIDOCAINE HYDROCHLORIDE 20 MG/ML
1 JELLY TOPICAL
Status: COMPLETED | OUTPATIENT
Start: 2021-03-15 | End: 2021-03-15

## 2021-03-15 RX ORDER — CYCLOPENTOLATE HYDROCHLORIDE 10 MG/ML
1 SOLUTION/ DROPS OPHTHALMIC
Status: COMPLETED | OUTPATIENT
Start: 2021-03-15 | End: 2021-03-15

## 2021-03-15 RX ORDER — MIDAZOLAM HYDROCHLORIDE 2 MG/2ML
INJECTION, SOLUTION INTRAMUSCULAR; INTRAVENOUS AS NEEDED
Status: DISCONTINUED | OUTPATIENT
Start: 2021-03-15 | End: 2021-03-15

## 2021-03-15 RX ORDER — KETOROLAC TROMETHAMINE 5 MG/ML
1 SOLUTION OPHTHALMIC 4 TIMES DAILY
Qty: 5 ML | Refills: 0
Start: 2021-03-15 | End: 2021-04-30 | Stop reason: ALTCHOICE

## 2021-03-15 RX ORDER — TETRACAINE HYDROCHLORIDE 5 MG/ML
SOLUTION OPHTHALMIC AS NEEDED
Status: DISCONTINUED | OUTPATIENT
Start: 2021-03-15 | End: 2021-03-15 | Stop reason: HOSPADM

## 2021-03-15 RX ORDER — KETOROLAC TROMETHAMINE 5 MG/ML
1 SOLUTION OPHTHALMIC
Status: COMPLETED | OUTPATIENT
Start: 2021-03-15 | End: 2021-03-15

## 2021-03-15 RX ORDER — PHENYLEPHRINE HCL 2.5 %
1 DROPS OPHTHALMIC (EYE)
Status: COMPLETED | OUTPATIENT
Start: 2021-03-15 | End: 2021-03-15

## 2021-03-15 RX ADMIN — KETOROLAC TROMETHAMINE 1 DROP: 5 SOLUTION OPHTHALMIC at 09:00

## 2021-03-15 RX ADMIN — LIDOCAINE HYDROCHLORIDE 1 APPLICATION: 20 JELLY TOPICAL at 08:15

## 2021-03-15 RX ADMIN — CYCLOPENTOLATE HYDROCHLORIDE 1 DROP: 10 SOLUTION/ DROPS OPHTHALMIC at 09:00

## 2021-03-15 RX ADMIN — PHENYLEPHRINE HYDROCHLORIDE 1 DROP: 25 SOLUTION/ DROPS OPHTHALMIC at 09:00

## 2021-03-15 RX ADMIN — KETOROLAC TROMETHAMINE 1 DROP: 5 SOLUTION OPHTHALMIC at 08:45

## 2021-03-15 RX ADMIN — CYCLOPENTOLATE HYDROCHLORIDE 1 DROP: 10 SOLUTION/ DROPS OPHTHALMIC at 08:45

## 2021-03-15 RX ADMIN — KETOROLAC TROMETHAMINE 1 DROP: 5 SOLUTION OPHTHALMIC at 08:30

## 2021-03-15 RX ADMIN — TETRACAINE HYDROCHLORIDE 1 DROP: 5 SOLUTION OPHTHALMIC at 08:15

## 2021-03-15 RX ADMIN — CYCLOPENTOLATE HYDROCHLORIDE 1 DROP: 10 SOLUTION/ DROPS OPHTHALMIC at 08:30

## 2021-03-15 RX ADMIN — LIDOCAINE HYDROCHLORIDE 1 APPLICATION: 20 JELLY TOPICAL at 08:45

## 2021-03-15 RX ADMIN — KETOROLAC TROMETHAMINE 1 DROP: 5 SOLUTION OPHTHALMIC at 08:15

## 2021-03-15 RX ADMIN — CYCLOPENTOLATE HYDROCHLORIDE 1 DROP: 10 SOLUTION/ DROPS OPHTHALMIC at 08:15

## 2021-03-15 RX ADMIN — PHENYLEPHRINE HYDROCHLORIDE 1 DROP: 25 SOLUTION/ DROPS OPHTHALMIC at 08:15

## 2021-03-15 RX ADMIN — PHENYLEPHRINE HYDROCHLORIDE 1 DROP: 25 SOLUTION/ DROPS OPHTHALMIC at 08:45

## 2021-03-15 RX ADMIN — PHENYLEPHRINE HYDROCHLORIDE 1 DROP: 25 SOLUTION/ DROPS OPHTHALMIC at 08:30

## 2021-03-15 RX ADMIN — MIDAZOLAM 1 MG: 1 INJECTION INTRAMUSCULAR; INTRAVENOUS at 09:02

## 2021-03-15 RX ADMIN — LIDOCAINE HYDROCHLORIDE 1 APPLICATION: 20 JELLY TOPICAL at 08:30

## 2021-03-15 NOTE — DISCHARGE INSTRUCTIONS
Dr Michelle Yanez Cataract Instructions    Activity:     1  No Driving until instructed   2  Keep shield on until seen tomorrow except when administering drops   3  No heavy lifting   30 pound limit   4  No water in eye   For one week      Diet:     1  Resume normal diet    Normal Symptoms:     1  Mild Headache   2  Scratchy or picky feeling around eye    Call the office if:     1  You have any questions or concerns   2  If eye pain is not relieved by extra strength tylenol    Office phone number:  [phone]      Next appointment:     1  See Dr Michelle Yanez at his office tomorrow as scheduled   __________________________________________________________   2  Bring blue eye kit with you and eyedrops to the office    A new set of comprehensive instructions will be given and reviewed with you during your office visit tomorrow

## 2021-03-15 NOTE — ANESTHESIA PREPROCEDURE EVALUATION
Procedure:  EXTRACTION EXTRACAPSULAR CATARACT PHACO INTRAOCULAR LENS (IOL) (Right Eye)    Relevant Problems   CARDIO   (+) CAD, multiple vessel   (+) Coronary artery disease involving native coronary artery of native heart with angina pectoris (HCC)   (+) Hyperlipidemia   (+) S/P CABG x 4        Physical Exam    Airway    Mallampati score: II  TM Distance: >3 FB  Neck ROM: full     Dental   No notable dental hx     Cardiovascular  Cardiovascular exam normal    Pulmonary  Pulmonary exam normal     Other Findings        Anesthesia Plan  ASA Score- 3     Anesthesia Type- IV sedation with anesthesia with ASA Monitors  Additional Monitors:   Airway Plan:           Plan Factors-Exercise tolerance (METS): >4 METS  Chart reviewed  EKG reviewed  Imaging results reviewed  Existing labs reviewed  Induction-     Postoperative Plan-     Informed Consent- Anesthetic plan and risks discussed with patient  I personally reviewed this patient with the CRNA  Discussed and agreed on the Anesthesia Plan with the CRNA  Shakir Diaz

## 2021-03-15 NOTE — OP NOTE
OPERATIVE REPORT    PATIENT NAME: Tejinder Briones    :  1940  MRN: [id number]  Pt Location: [facility] OR ROOM 01    Surgery Date: 3/15/2021    Surgeon(s) and Role:     * Jesus Meyer MD - Primary    Cortical age-related cataract, right eye [H25 011]    Post-Op Diagnosis Codes:     * Cortical age-related cataract, right eye [H25 011]    Procedure(s):  EXTRACTION EXTRACAPSULAR CATARACT PHACO INTRAOCULAR LENS (IOL)    Anesthesia Type:   IV Sedation with Anesthesia    Operative Indications:  Cortical age-related cataract, right eye [H25 011]  Decreased vision to 20/40    With problems driving  Pt requested cataract sx the right eye    Procedure and Technique:    Procedure Details     The patient was brought in the OR in stable condition and placed on the operative table  The right eye was prepped and draped in the usual sterile manner  Attention was directed to the right eye where a lid speculum was placed  A 2 4 mm clear corneal incision was made temperally  1/2 cc of 1% MPF Lidocaine was irrigated into the anterior chamber followed by viscoat  The side port incision was placed superiorly  The capsularrhexis was made and the nucleus was hydrodissected with BSS  The nucleus was then removed with the phaco handpiece followed by removal of the cortical material with the I/A handpiece  The capsular bag was then filled with Provisc  The IOL was folded and placed in to the capsular bag and centered well  The remaining Provisc was removed from the eye with the I/A  The wounds were hydrated with BSS and found to be water tight  The lid speculum was removed and 2 drops of Gatifloxicin were placed over the cornea  A protective eye shield was taped over the eye and the patient went to PACU in stable condition  I will see the patient in the office tomorrow and the expected post op period is a few weeks         Complications: None        Disposition: PACU   Condition: Stable    SIGNATURE: Jesus Meyer MD  DATE: March 15, 2021  TIME: 9:28 AM

## 2021-03-30 ENCOUNTER — IMMUNIZATIONS (OUTPATIENT)
Dept: FAMILY MEDICINE CLINIC | Facility: HOSPITAL | Age: 81
End: 2021-03-30

## 2021-03-30 DIAGNOSIS — Z23 ENCOUNTER FOR IMMUNIZATION: Primary | ICD-10-CM

## 2021-03-30 PROCEDURE — 91301 SARS-COV-2 / COVID-19 MRNA VACCINE (MODERNA) 100 MCG: CPT

## 2021-03-30 PROCEDURE — 0012A SARS-COV-2 / COVID-19 MRNA VACCINE (MODERNA) 100 MCG: CPT

## 2021-04-21 ENCOUNTER — TELEPHONE (OUTPATIENT)
Dept: CARDIOLOGY CLINIC | Facility: CLINIC | Age: 81
End: 2021-04-21

## 2021-04-21 DIAGNOSIS — I25.10 CAD, MULTIPLE VESSEL: Primary | ICD-10-CM

## 2021-04-21 NOTE — TELEPHONE ENCOUNTER
Pt called to advise that his ankles are swollen, approx 1 month  He had an issue with sciatica and has been doing some exercises to deal with that and noticed the swelling  Please advise your thoughts

## 2021-04-21 NOTE — TELEPHONE ENCOUNTER
S/W pt who is making an apt to come in next week  Please order labs and he will have them done ASAP so they are back for his apt  He will be having labs drawn at [address]

## 2021-04-22 ENCOUNTER — APPOINTMENT (OUTPATIENT)
Dept: LAB | Facility: CLINIC | Age: 81
End: 2021-04-22
Payer: COMMERCIAL

## 2021-04-22 DIAGNOSIS — I25.10 CAD, MULTIPLE VESSEL: ICD-10-CM

## 2021-04-22 DIAGNOSIS — R79.89 ELEVATED LFTS: Primary | ICD-10-CM

## 2021-04-22 DIAGNOSIS — E83.118 OTHER HEMOCHROMATOSIS: ICD-10-CM

## 2021-04-22 LAB
ALBUMIN SERPL BCP-MCNC: 3.8 G/DL (ref 3.5–5)
ALP SERPL-CCNC: 50 U/L (ref 46–116)
ALT SERPL W P-5'-P-CCNC: 24 U/L (ref 12–78)
ANION GAP SERPL CALCULATED.3IONS-SCNC: 5 MMOL/L (ref 4–13)
AST SERPL W P-5'-P-CCNC: 14 U/L (ref 5–45)
BILIRUB SERPL-MCNC: 0.76 MG/DL (ref 0.2–1)
BUN SERPL-MCNC: 20 MG/DL (ref 5–25)
CALCIUM SERPL-MCNC: 8.8 MG/DL (ref 8.3–10.1)
CHLORIDE SERPL-SCNC: 110 MMOL/L (ref 100–108)
CHOLEST SERPL-MCNC: 116 MG/DL (ref 50–200)
CO2 SERPL-SCNC: 27 MMOL/L (ref 21–32)
CREAT SERPL-MCNC: 1.19 MG/DL (ref 0.6–1.3)
ERYTHROCYTE [DISTWIDTH] IN BLOOD BY AUTOMATED COUNT: 12.8 % (ref 11.6–15.1)
GFR SERPL CREATININE-BSD FRML MDRD: 58 ML/MIN/1.73SQ M
GLUCOSE P FAST SERPL-MCNC: 110 MG/DL (ref 65–99)
HCT VFR BLD AUTO: 43 % (ref 36.5–49.3)
HDLC SERPL-MCNC: 33 MG/DL
HGB BLD-MCNC: 14.2 G/DL (ref 12–17)
LDLC SERPL CALC-MCNC: 60 MG/DL (ref 0–100)
MCH RBC QN AUTO: 29.4 PG (ref 26.8–34.3)
MCHC RBC AUTO-ENTMCNC: 33 G/DL (ref 31.4–37.4)
MCV RBC AUTO: 89 FL (ref 82–98)
NONHDLC SERPL-MCNC: 83 MG/DL
NT-PROBNP SERPL-MCNC: 192 PG/ML
PLATELET # BLD AUTO: 260 THOUSANDS/UL (ref 149–390)
PMV BLD AUTO: 10.6 FL (ref 8.9–12.7)
POTASSIUM SERPL-SCNC: 4.3 MMOL/L (ref 3.5–5.3)
PROT SERPL-MCNC: 7.7 G/DL (ref 6.4–8.2)
RBC # BLD AUTO: 4.83 MILLION/UL (ref 3.88–5.62)
SODIUM SERPL-SCNC: 142 MMOL/L (ref 136–145)
TRIGL SERPL-MCNC: 113 MG/DL
WBC # BLD AUTO: 6.81 THOUSAND/UL (ref 4.31–10.16)

## 2021-04-22 PROCEDURE — 85027 COMPLETE CBC AUTOMATED: CPT | Performed by: INTERNAL MEDICINE

## 2021-04-22 PROCEDURE — 83880 ASSAY OF NATRIURETIC PEPTIDE: CPT

## 2021-04-22 PROCEDURE — 36415 COLL VENOUS BLD VENIPUNCTURE: CPT | Performed by: INTERNAL MEDICINE

## 2021-04-22 PROCEDURE — 80061 LIPID PANEL: CPT | Performed by: INTERNAL MEDICINE

## 2021-04-22 PROCEDURE — 80053 COMPREHEN METABOLIC PANEL: CPT | Performed by: INTERNAL MEDICINE

## 2021-04-23 ENCOUNTER — TELEPHONE (OUTPATIENT)
Dept: CARDIOLOGY CLINIC | Facility: CLINIC | Age: 81
End: 2021-04-23

## 2021-04-23 NOTE — TELEPHONE ENCOUNTER
Patient called he sees Dr Allen Roads is concerned with ankle swelling he has an appt next week please call

## 2021-04-27 ENCOUNTER — TELEPHONE (OUTPATIENT)
Dept: CARDIOLOGY CLINIC | Facility: CLINIC | Age: 81
End: 2021-04-27

## 2021-04-27 NOTE — TELEPHONE ENCOUNTER
----- Message from Waffle, DO sent at 4/26/2021  2:57 PM EDT -----  Can you please let the patient know lab work looked normal - will go over it with him during upcoming appointment

## 2021-04-27 NOTE — TELEPHONE ENCOUNTER
Patient called back  Results were given as per Dr Danelle Stone instructions  No further questions at this time

## 2021-04-30 ENCOUNTER — OFFICE VISIT (OUTPATIENT)
Dept: CARDIOLOGY CLINIC | Facility: CLINIC | Age: 81
End: 2021-04-30
Payer: COMMERCIAL

## 2021-04-30 VITALS
HEART RATE: 68 BPM | BODY MASS INDEX: 26.4 KG/M2 | OXYGEN SATURATION: 98 % | DIASTOLIC BLOOD PRESSURE: 70 MMHG | WEIGHT: 168.2 LBS | HEIGHT: 67 IN | TEMPERATURE: 98 F | SYSTOLIC BLOOD PRESSURE: 128 MMHG

## 2021-04-30 DIAGNOSIS — Z95.1 S/P CABG X 4: ICD-10-CM

## 2021-04-30 DIAGNOSIS — I25.119 CORONARY ARTERY DISEASE INVOLVING NATIVE CORONARY ARTERY OF NATIVE HEART WITH ANGINA PECTORIS (HCC): Primary | ICD-10-CM

## 2021-04-30 DIAGNOSIS — Z15.89 MTHFR MUTATION: ICD-10-CM

## 2021-04-30 DIAGNOSIS — I10 ESSENTIAL HYPERTENSION: ICD-10-CM

## 2021-04-30 DIAGNOSIS — E78.5 HYPERLIPIDEMIA, UNSPECIFIED HYPERLIPIDEMIA TYPE: ICD-10-CM

## 2021-04-30 DIAGNOSIS — E78.2 MIXED HYPERLIPIDEMIA: ICD-10-CM

## 2021-04-30 DIAGNOSIS — I25.10 CAD, MULTIPLE VESSEL: ICD-10-CM

## 2021-04-30 PROCEDURE — 3074F SYST BP LT 130 MM HG: CPT | Performed by: INTERNAL MEDICINE

## 2021-04-30 PROCEDURE — 93000 ELECTROCARDIOGRAM COMPLETE: CPT | Performed by: INTERNAL MEDICINE

## 2021-04-30 PROCEDURE — 1160F RVW MEDS BY RX/DR IN RCRD: CPT | Performed by: INTERNAL MEDICINE

## 2021-04-30 PROCEDURE — 1036F TOBACCO NON-USER: CPT | Performed by: INTERNAL MEDICINE

## 2021-04-30 PROCEDURE — 99214 OFFICE O/P EST MOD 30 MIN: CPT | Performed by: INTERNAL MEDICINE

## 2021-04-30 PROCEDURE — 3078F DIAST BP <80 MM HG: CPT | Performed by: INTERNAL MEDICINE

## 2021-04-30 RX ORDER — LISINOPRIL 5 MG/1
5 TABLET ORAL DAILY
Qty: 30 TABLET | Refills: 2 | Status: SHIPPED | OUTPATIENT
Start: 2021-04-30 | End: 2021-05-10 | Stop reason: SDUPTHER

## 2021-04-30 NOTE — PROGRESS NOTES
Danette Hough  1940  [id number]  [facility] [place]  [address] [id number]    Interval History:  Danette Hough is a 79 y o  male who presents for follow up of coronary artery disease and to discuss LE edema  1 month ago, he developed bilateral lower extremity edema which is worsened at the end of the day  He denies any shortness of breath, orthopnea or paroxysmal nocturnal dyspnea  Blood work was ordered which showed normal BNP, liver and renal function  He has gained 5 pounds because diet has changed as he has additional help at home and is eating foods he previously was not used to eating  He hurt his back shoveling snow  Slowly improving  Exercising with treadmill  daily  Taking care of wife with dementia  Previously, he underwent coronary artery bypass grafting on January 15, 2015  He had LIMA to LAD, SVG to PDA, SVG to OM  Catheterization was done prior to this for evaluation of chest pain  He had a 70% left main stenosis, 60% LAD stenosis, 80% circumflex disease and 90% PDA stenosis  Surgery had no complications  Cardiac risk factors include advanced age (older than 54 for men, 72 for women), dyslipidemia and male gender  Past Medical History:   Diagnosis Date    Arthritis     Benign essential hypertension     Coronary artery disease     Hearing problem     Hx of CABG     Hypercholesteremia     Wears glasses     as needed    Wears hearing aid     bilateral aids     Past Surgical History:   Procedure Laterality Date    COLONOSCOPY  01/13/2021    CORONARY ARTERY BYPASS GRAFT      triple    EGD      stomach polyp    HERNIA REPAIR Bilateral     inguinal    KNEE SURGERY Right     ligament    LASIK  2000    LA XCAPSL CTRC RMVL INSJ IO LENS PROSTH W/O ECP Left 2/8/2021    Procedure: EXTRACTION EXTRACAPSULAR CATARACT PHACO INTRAOCULAR LENS (IOL);   Surgeon: Ivelisse Rollins MD;  Location: [facility] OR;  Service: Ophthalmology    NH XCAPSL CTRC RMVL INSJ IO LENS PROSTH W/O ECP Right 3/15/2021    Procedure: EXTRACTION EXTRACAPSULAR CATARACT PHACO INTRAOCULAR LENS (IOL);   Surgeon: Kathryn Hernandez MD;  Location: [facility] MAIN OR;  Service: Ophthalmology     Social History     Socioeconomic History    Marital status: /Civil Union     Spouse name: Not on file    Number of children: Not on file    Years of education: Not on file    Highest education level: Not on file   Occupational History    Not on file   Social Needs    Financial resource strain: Not on file    Food insecurity     Worry: Not on file     Inability: Not on file    Transportation needs     Medical: Not on file     Non-medical: Not on file   Tobacco Use    Smoking status: Former Smoker     Quit date: 1970     Years since quittin 3    Smokeless tobacco: Never Used   Substance and Sexual Activity    Alcohol use: Not Currently     Comment: quit     Drug use: Never    Sexual activity: Not on file   Lifestyle    Physical activity     Days per week: Not on file     Minutes per session: Not on file    Stress: Not on file   Relationships    Social connections     Talks on phone: Not on file     Gets together: Not on file     Attends Gnosticism service: Not on file     Active member of club or organization: Not on file     Attends meetings of clubs or organizations: Not on file     Relationship status: Not on file    Intimate partner violence     Fear of current or ex partner: Not on file     Emotionally abused: Not on file     Physically abused: Not on file     Forced sexual activity: Not on file   Other Topics Concern    Not on file   Social History Narrative    Not on file     Family History   Problem Relation Age of Onset    Hypertension Mother     Coronary artery disease Father     Stroke Father     Aneurysm Brother         abdominal aortic aneurysm       Current Outpatient Medications:     aspirin 81 MG tablet, Take 1 tablet by mouth daily at bedtime , Disp: , Rfl:     atorvastatin (LIPITOR) 10 mg tablet, TAKE 1 TABLET BY MOUTH  DAILY AS DIRECTED (Patient taking differently: 10 mg daily at bedtime ), Disp: 90 tablet, Rfl: 3    B Complex Vitamins (VITAMIN B COMPLEX PO), Take by mouth every morning, Disp: , Rfl:     ibuprofen (ADVIL) 200 mg tablet, Take 1 tablet by mouth 3 (three) times a day as needed, Disp: , Rfl:     Multiple Vitamins-Minerals (MULTI-VITAMIN/MINERALS PO), Take 1 tablet by mouth every morning Powder-[employer]- one scoop in H2O, Disp: , Rfl:     VITAMIN D PO, Take 2,000 Units by mouth every morning , Disp: , Rfl:     cyclobenzaprine (FLEXERIL) 5 mg tablet, Take 1 tablet (5 mg total) by mouth daily at bedtime as needed for muscle spasms (Patient not taking: Reported on 4/30/2021), Disp: 10 tablet, Rfl: 0    lisinopril (ZESTRIL) 5 mg tablet, Take 1 tablet (5 mg total) by mouth daily, Disp: 30 tablet, Rfl: 2  The following portions of the patient's history were reviewed and updated as appropriate: allergies, current medications, past family history, past medical history, past social history, past surgical history and problem list     Review of Systems:  Review of Systems   Respiratory: Negative for shortness of breath  Cardiovascular: Positive for leg swelling  Negative for chest pain and palpitations  Musculoskeletal: Positive for arthralgias, back pain and myalgias  All other systems reviewed and are negative  Physical Exam:  /70 (BP Location: Left arm, Patient Position: Sitting, Cuff Size: Standard)   Pulse 68   Temp 98 °F (36 7 °C) (Temporal)   Ht 5' 6 5" (1 689 m)   Wt 76 3 kg (168 lb 3 2 oz)   SpO2 98%   BMI 26 74 kg/m²   Physical Exam   Constitutional: He is oriented to person, place, and time  He appears well-developed  No distress  HENT:   Head: Normocephalic and atraumatic  Eyes: Pupils are equal, round, and reactive to light   Conjunctivae and EOM are normal    Neck: Neck supple  No JVD present  No thyromegaly present  Cardiovascular: Normal rate and regular rhythm  Exam reveals no gallop and no friction rub  Murmur heard  Pulmonary/Chest: Effort normal and breath sounds normal    Musculoskeletal:         General: No edema  Neurological: He is alert and oriented to person, place, and time  No cranial nerve deficit  Skin: Skin is warm and dry  No rash noted  He is not diaphoretic  No erythema  Psychiatric: He has a normal mood and affect  His behavior is normal  Judgment and thought content normal      Cardiographics  ECG: normal sinus rhythm, incomplete RBBB  LV Ejection Fraction: LV ejection fraction >= 40%  Imaging:No results found      Lab Review  Lab Results   Component Value Date    CHOL 160 01/14/2015    TRIG 113 04/22/2021    TRIG 100 06/01/2020    TRIG 136 05/29/2019    TRIG 81 06/08/2018    TRIG 131 01/14/2015    HDL 33 (L) 04/22/2021    HDL 31 (L) 06/01/2020    HDL 38 (L) 05/29/2019    HDL 40 06/08/2018    HDL 28 01/14/2015     Appointment on 04/22/2021   Component Date Value    NT-proBNP 04/22/2021 192    Orders Only on 04/21/2021   Component Date Value    Sodium 04/22/2021 142     Potassium 04/22/2021 4 3     Chloride 04/22/2021 110*    CO2 04/22/2021 27     ANION GAP 04/22/2021 5     BUN 04/22/2021 20     Creatinine 04/22/2021 1 19     Glucose, Fasting 04/22/2021 110*    Calcium 04/22/2021 8 8     AST 04/22/2021 14     ALT 04/22/2021 24     Alkaline Phosphatase 04/22/2021 50     Total Protein 04/22/2021 7 7     Albumin 04/22/2021 3 8     Total Bilirubin 04/22/2021 0 76     eGFR 04/22/2021 58     WBC 04/22/2021 6 81     RBC 04/22/2021 4 83     Hemoglobin 04/22/2021 14 2     Hematocrit 04/22/2021 43 0     MCV 04/22/2021 89     MCH 04/22/2021 29 4     MCHC 04/22/2021 33 0     RDW 04/22/2021 12 8     Platelets 31/85/0091 260     MPV 04/22/2021 10 6     Cholesterol 04/22/2021 116     Triglycerides 04/22/2021 113     HDL, Direct 04/22/2021 33*    LDL Calculated 04/22/2021 60     Non-HDL-Chol (CHOL-HDL) 04/22/2021 83    Orders Only on 03/09/2021   Component Date Value    SARS-CoV-2 03/09/2021 Negative      Assessment/Plan     1  Coronary artery disease involving native coronary artery of native heart with angina pectoris ([facility] [address] )    2  Essential hypertension    3  Mixed hyperlipidemia    4  MTHFR mutation    5  S/P CABG x 4    6  CAD, multiple vessel    7  Hyperlipidemia, unspecified hyperlipidemia type      - Recent blood work showed normal renal, liver function and normal BNP   - Edema may be due to extra salt - discussed reduction  He also has been using NSAIDS occasionally which may cause edema  - Will stop amlodipine and begin lisinopril 5 mg daily     - Monitor at home     - LDL at goal (<70 mg/dL) - 69 on recent testing     - continue regular exercise and diet - exercises 6 times a week  - Continue atorvastatin  - May stop fish oil

## 2021-05-10 DIAGNOSIS — I10 ESSENTIAL HYPERTENSION: ICD-10-CM

## 2021-05-10 RX ORDER — LISINOPRIL 5 MG/1
5 TABLET ORAL DAILY
Qty: 90 TABLET | Refills: 3 | Status: SHIPPED | OUTPATIENT
Start: 2021-05-10 | End: 2021-05-14 | Stop reason: SDUPTHER

## 2021-05-14 RX ORDER — LISINOPRIL 5 MG/1
5 TABLET ORAL DAILY
Qty: 90 TABLET | Refills: 3 | Status: SHIPPED | OUTPATIENT
Start: 2021-05-14 | End: 2021-06-28

## 2021-06-09 ENCOUNTER — OFFICE VISIT (OUTPATIENT)
Dept: FAMILY MEDICINE CLINIC | Facility: CLINIC | Age: 81
End: 2021-06-09
Payer: COMMERCIAL

## 2021-06-09 VITALS
RESPIRATION RATE: 18 BRPM | SYSTOLIC BLOOD PRESSURE: 124 MMHG | OXYGEN SATURATION: 98 % | HEART RATE: 84 BPM | TEMPERATURE: 97.2 F | WEIGHT: 162 LBS | BODY MASS INDEX: 25.43 KG/M2 | HEIGHT: 67 IN | DIASTOLIC BLOOD PRESSURE: 68 MMHG

## 2021-06-09 DIAGNOSIS — E78.5 HYPERLIPIDEMIA, UNSPECIFIED HYPERLIPIDEMIA TYPE: ICD-10-CM

## 2021-06-09 DIAGNOSIS — I10 ESSENTIAL HYPERTENSION: ICD-10-CM

## 2021-06-09 DIAGNOSIS — R05.9 COUGH: ICD-10-CM

## 2021-06-09 DIAGNOSIS — Z91.09 ENVIRONMENTAL ALLERGIES: Primary | ICD-10-CM

## 2021-06-09 PROCEDURE — 3725F SCREEN DEPRESSION PERFORMED: CPT | Performed by: FAMILY MEDICINE

## 2021-06-09 PROCEDURE — 99214 OFFICE O/P EST MOD 30 MIN: CPT | Performed by: FAMILY MEDICINE

## 2021-06-09 PROCEDURE — 3078F DIAST BP <80 MM HG: CPT | Performed by: FAMILY MEDICINE

## 2021-06-09 PROCEDURE — 1160F RVW MEDS BY RX/DR IN RCRD: CPT | Performed by: FAMILY MEDICINE

## 2021-06-09 PROCEDURE — 1036F TOBACCO NON-USER: CPT | Performed by: FAMILY MEDICINE

## 2021-06-09 PROCEDURE — 3074F SYST BP LT 130 MM HG: CPT | Performed by: FAMILY MEDICINE

## 2021-06-09 RX ORDER — ATORVASTATIN CALCIUM 10 MG/1
10 TABLET, FILM COATED ORAL DAILY
Qty: 90 TABLET | Refills: 0 | Status: SHIPPED | OUTPATIENT
Start: 2021-06-09 | End: 2021-06-11

## 2021-06-09 NOTE — PROGRESS NOTES
Gabbie Shetty 1940 male MRN: [id number]    FAMILY PRACTICE OFFICE VISIT  [facility] - [address]      ASSESSMENT/PLAN  Gabbie Shetty is a 79 y o  male presents to the office for    Diagnoses and all orders for this visit:    Environmental allergies    Essential hypertension    Hyperlipidemia, unspecified hyperlipidemia type  -     Discontinue: atorvastatin (LIPITOR) 10 mg tablet; Take 1 tablet (10 mg total) by mouth daily    Cough       Start allegra every 12 hours; try to avoid D  Flonase in each nostril in the AM  Normal saline spray at bedtime  If flonase given you nose bleed then STOP and do normal saline spray twice a day  If no improvement in 2 weeks call the office  hypertension controlled continue medications as prescribed  Hyperlipidemia continue medications as prescribed ( duplicate was deleted from list)    BMI Counseling: Body mass index is 25 76 kg/m²  The BMI is above normal  Nutrition recommendations include consuming healthier snacks  Future Appointments   Date Time Provider Armen Polanco   12/13/2021  9:00 AM Noy Yin MD [facility]          SUBJECTIVE  CC: Cough (patient here with a dry cough for over 1 month )      HPI:  Gabbie Shetty is a 79 y o  male who presents for In acute appointment  Patient states that he has had a dry cough for over a month  Started around May  At 1st thought it was secondary to the COVID-19 vaccine the realized that was a month after it started  Patient states that he has cough and congestion usually worse during the nighttime and early morning  Patient's blood pressure has been very well controlled taking his medications as prescribed  Taking Lipitor without any side effects  Patient does admit to having allergies  Review of Systems   Constitutional: Negative for activity change, appetite change, chills, fatigue and fever  HENT: Positive for congestion      Respiratory: Positive for cough  Negative for chest tightness and shortness of breath  Cardiovascular: Negative for chest pain and leg swelling  Gastrointestinal: Negative for abdominal distention, abdominal pain, constipation, diarrhea, nausea and vomiting  Allergic/Immunologic: Positive for environmental allergies  All other systems reviewed and are negative        Historical Information   The patient history was reviewed as follows:  Past Medical History:   Diagnosis Date    Arthritis     Benign essential hypertension     Coronary artery disease     Hearing problem     Hx of CABG     Hypercholesteremia     Wears glasses     as needed    Wears hearing aid     bilateral aids         Medications:     Current Outpatient Medications:     aspirin 81 MG tablet, Take 1 tablet by mouth daily at bedtime , Disp: , Rfl:     atorvastatin (LIPITOR) 10 mg tablet, TAKE 1 TABLET BY MOUTH  DAILY AS DIRECTED (Patient taking differently: 10 mg daily at bedtime ), Disp: 90 tablet, Rfl: 3    B Complex Vitamins (VITAMIN B COMPLEX PO), Take by mouth every morning, Disp: , Rfl:     ibuprofen (ADVIL) 200 mg tablet, Take 1 tablet by mouth 3 (three) times a day as needed, Disp: , Rfl:     lisinopril (ZESTRIL) 5 mg tablet, Take 1 tablet (5 mg total) by mouth daily, Disp: 90 tablet, Rfl: 3    Multiple Vitamins-Minerals (MULTI-VITAMIN/MINERALS PO), Take 1 tablet by mouth every morning Powder[employer]- one scoop in H2O, Disp: , Rfl:     VITAMIN D PO, Take 2,000 Units by mouth every morning , Disp: , Rfl:     cyclobenzaprine (FLEXERIL) 5 mg tablet, Take 1 tablet (5 mg total) by mouth daily at bedtime as needed for muscle spasms (Patient not taking: Reported on 4/30/2021), Disp: 10 tablet, Rfl: 0    Allergies   Allergen Reactions    Acetaminophen Throat Swelling    Amoxicillin Rash       OBJECTIVE  Vitals:   Vitals:    06/09/21 0907   BP: 124/68   BP Location: Left arm   Patient Position: Sitting   Cuff Size: Standard   Pulse: 84 Resp: 18   Temp: (!) 97 2 °F (36 2 °C)   TempSrc: Temporal   SpO2: 98%   Weight: 73 5 kg (162 lb)   Height: 5' 6 5" (1 689 m)         Physical Exam  Vitals reviewed  Constitutional:       Appearance: He is well-developed  HENT:      Head: Normocephalic and atraumatic  Right Ear: A middle ear effusion is present  Left Ear: A middle ear effusion is present  Nose: Mucosal edema present  Mouth/Throat:      Pharynx: Posterior oropharyngeal erythema present  Eyes:      Conjunctiva/sclera: Conjunctivae normal       Pupils: Pupils are equal, round, and reactive to light  Cardiovascular:      Rate and Rhythm: Normal rate and regular rhythm  Heart sounds: Normal heart sounds  Pulmonary:      Effort: Pulmonary effort is normal  No respiratory distress  Breath sounds: Normal breath sounds  Musculoskeletal:         General: Normal range of motion  Cervical back: Normal range of motion and neck supple  Skin:     General: Skin is warm  Capillary Refill: Capillary refill takes less than 2 seconds  Neurological:      Mental Status: He is alert and oriented to person, place, and time                      Kelly Snellen, MD,   [facility]  6/9/2021

## 2021-06-09 NOTE — PATIENT INSTRUCTIONS
Start allegra every 12 hours; try to avoid D  Flonase in each nostril in the AM  Normal saline spray at bedtime  If flonase given you nose bleed then STOP and do normal saline spray twice a day  If no improvement in 2 weeks call the office

## 2021-06-11 DIAGNOSIS — E78.5 HYPERLIPIDEMIA, UNSPECIFIED HYPERLIPIDEMIA TYPE: ICD-10-CM

## 2021-06-11 RX ORDER — ATORVASTATIN CALCIUM 10 MG/1
TABLET, FILM COATED ORAL
Qty: 90 TABLET | Refills: 0 | Status: SHIPPED | OUTPATIENT
Start: 2021-06-11 | End: 2021-06-28

## 2021-06-11 RX ORDER — ATORVASTATIN CALCIUM 10 MG/1
10 TABLET, FILM COATED ORAL DAILY
Qty: 90 TABLET | Refills: 3 | Status: SHIPPED | OUTPATIENT
Start: 2021-06-11 | End: 2021-10-04 | Stop reason: SDUPTHER

## 2021-06-28 ENCOUNTER — APPOINTMENT (OUTPATIENT)
Dept: RADIOLOGY | Facility: CLINIC | Age: 81
End: 2021-06-28
Payer: COMMERCIAL

## 2021-06-28 ENCOUNTER — OFFICE VISIT (OUTPATIENT)
Dept: FAMILY MEDICINE CLINIC | Facility: CLINIC | Age: 81
End: 2021-06-28
Payer: COMMERCIAL

## 2021-06-28 ENCOUNTER — TELEPHONE (OUTPATIENT)
Dept: FAMILY MEDICINE CLINIC | Facility: CLINIC | Age: 81
End: 2021-06-28

## 2021-06-28 VITALS
SYSTOLIC BLOOD PRESSURE: 170 MMHG | HEIGHT: 67 IN | OXYGEN SATURATION: 18 % | TEMPERATURE: 97.7 F | HEART RATE: 67 BPM | BODY MASS INDEX: 25.46 KG/M2 | DIASTOLIC BLOOD PRESSURE: 80 MMHG | WEIGHT: 162.2 LBS | RESPIRATION RATE: 18 BRPM

## 2021-06-28 DIAGNOSIS — R05.9 COUGH: ICD-10-CM

## 2021-06-28 DIAGNOSIS — I10 ESSENTIAL HYPERTENSION: ICD-10-CM

## 2021-06-28 DIAGNOSIS — R05.9 COUGH: Primary | ICD-10-CM

## 2021-06-28 PROCEDURE — 71046 X-RAY EXAM CHEST 2 VIEWS: CPT

## 2021-06-28 PROCEDURE — 99214 OFFICE O/P EST MOD 30 MIN: CPT | Performed by: FAMILY MEDICINE

## 2021-06-28 RX ORDER — LOSARTAN POTASSIUM 25 MG/1
25 TABLET ORAL DAILY
Qty: 30 TABLET | Refills: 0 | Status: SHIPPED | OUTPATIENT
Start: 2021-06-28 | End: 2021-07-12 | Stop reason: SDUPTHER

## 2021-06-28 RX ORDER — AZITHROMYCIN 250 MG/1
TABLET, FILM COATED ORAL
Qty: 6 TABLET | Refills: 0 | Status: SHIPPED | OUTPATIENT
Start: 2021-06-28 | End: 2021-07-02

## 2021-06-28 NOTE — TELEPHONE ENCOUNTER
I spoke with patient and advise to take 1/2 tablet daily in the AM rather then a full 25mg  Patient was  170/80 here and at home this morning  PLEASE call him tomorrow at 1-2 pms to see what his blood pressure is like      Thank you

## 2021-06-28 NOTE — TELEPHONE ENCOUNTER
Dr Russell Shone    Patient says his losartan  rx was changed from 5 mg to 25 mg  He has not taken new med as yet, but he took his blood pressure after today's visit around 11:30 and  it was 117/62  Patient is concerned about taking new dosage 25 mg tomorrow  Please advise

## 2021-06-28 NOTE — PROGRESS NOTES
Carole Leach 1940 male MRN: [id number]    FAMILY PRACTICE OFFICE VISIT  [facility] - [address]      ASSESSMENT/PLAN  Carole Leach is a 79 y o  male presents to the office for    Diagnoses and all orders for this visit:    Essential hypertension  -     losartan (COZAAR) 25 mg tablet; Take 1 tablet (25 mg total) by mouth daily    Cough  -     azithromycin (ZITHROMAX) 250 mg tablet; Take 2 tablets today then 1 tablet daily x 4 days  -     XR chest pa & lateral; Future       Cough is not improved with Allegra usage  At this time patient stated that he just recently started lisinopril 5 mg in December  Will switch to losartan 25 mg and keep a very strict blood pressure log  If patient's blood pressure is elevated he is to call us and we can increase it to 50 mg  I also will place the patient on a Z-Michael for possible sinus infection  In sent for chest x-ray to be complete  I did advise the patient that his blood pressure is elevated today secondary to the stress of his brother surgery today  Future Appointments   Date Time Provider Armen Polanco   7/12/2021  8:00 AM Luis Hudson MD [address] [place] [address] [facility]   12/13/2021  9:00 AM Brigida Clarke MD [facility]          SUBJECTIVE  CC: Cough (3 weeks)      HPI:  Carole Leach is a 79 y o  male who presents for an acute appointment  Patient has been complaining about a cough since mid April  He states that he thought it was getting better but then only has worsen last 3 weeks  Patient states he has been trying antihistamines with no improvement  Patient does recall being started on lisinopril in December  Does not think it started until shortly after  Patient's blood pressure usually well controlled  Unfortunately today he is stressed given his brother's having surgery      Answers for HPI/ROS submitted by the patient on 6/27/2021  Chronicity: recurrent  Onset: 1 to 4 weeks ago  Progression since onset: waxing and waning  Frequency: every few hours  Cough characteristics: non-productive  ear congestion: No  heartburn: No  hemoptysis: No  nasal congestion: Yes  sweats: No  weight loss: No  Aggravated by: nothing      Review of Systems   Constitutional: Negative for activity change, appetite change, chills, fatigue and fever  HENT: Positive for postnasal drip and rhinorrhea  Negative for congestion, ear pain and sore throat  Respiratory: Negative for cough, chest tightness, shortness of breath and wheezing  Cardiovascular: Negative for chest pain and leg swelling  Gastrointestinal: Negative for abdominal distention, abdominal pain, constipation, diarrhea, nausea and vomiting  Musculoskeletal: Negative for myalgias  Skin: Negative for rash  Neurological: Negative for headaches  Psychiatric/Behavioral:        About his brother surgery  depressed   All other systems reviewed and are negative        Historical Information   The patient history was reviewed as follows:  Past Medical History:   Diagnosis Date    Arthritis     Benign essential hypertension     Coronary artery disease     Hearing problem     Hx of CABG     Hypercholesteremia     Wears glasses     as needed    Wears hearing aid     bilateral aids         Medications:     Current Outpatient Medications:     aspirin 81 MG tablet, Take 1 tablet by mouth daily at bedtime , Disp: , Rfl:     atorvastatin (LIPITOR) 10 mg tablet, Take 1 tablet (10 mg total) by mouth daily, Disp: 90 tablet, Rfl: 3    B Complex Vitamins (VITAMIN B COMPLEX PO), Take by mouth every morning, Disp: , Rfl:     ibuprofen (ADVIL) 200 mg tablet, Take 1 tablet by mouth 3 (three) times a day as needed, Disp: , Rfl:     Multiple Vitamins-Minerals (MULTI-VITAMIN/MINERALS PO), Take 1 tablet by mouth every morning Powder-[employer]- one scoop in H2O, Disp: , Rfl:     VITAMIN D PO, Take 2,000 Units by mouth every morning , Disp: , Rfl:     azithromycin (ZITHROMAX) 250 mg tablet, Take 2 tablets today then 1 tablet daily x 4 days, Disp: 6 tablet, Rfl: 0    losartan (COZAAR) 25 mg tablet, Take 1 tablet (25 mg total) by mouth daily, Disp: 30 tablet, Rfl: 0    Allergies   Allergen Reactions    Acetaminophen Throat Swelling    Amoxicillin Rash       OBJECTIVE  Vitals:   Vitals:    06/28/21 0812   BP: 170/80   BP Location: Left arm   Patient Position: Sitting   Cuff Size: Standard   Pulse: 67   Resp: 18   Temp: 97 7 °F (36 5 °C)   TempSrc: Temporal   SpO2: (!) 18%   Weight: 73 6 kg (162 lb 3 2 oz)   Height: 5' 6 5" (1 689 m)         Physical Exam  Vitals reviewed  Constitutional:       Appearance: He is well-developed  HENT:      Head: Normocephalic and atraumatic  Right Ear: Tympanic membrane, ear canal and external ear normal  There is no impacted cerumen  Left Ear: Tympanic membrane, ear canal and external ear normal  There is no impacted cerumen  Nose: No congestion  Mouth/Throat:      Pharynx: Posterior oropharyngeal erythema present  Eyes:      Conjunctiva/sclera: Conjunctivae normal       Pupils: Pupils are equal, round, and reactive to light  Cardiovascular:      Rate and Rhythm: Normal rate and regular rhythm  Heart sounds: Normal heart sounds  Pulmonary:      Effort: Pulmonary effort is normal  No respiratory distress  Breath sounds: Normal breath sounds  Musculoskeletal:         General: Normal range of motion  Cervical back: Normal range of motion and neck supple  Skin:     General: Skin is warm  Capillary Refill: Capillary refill takes less than 2 seconds  Neurological:      Mental Status: He is alert and oriented to person, place, and time  Psychiatric:         Behavior: Behavior normal          Thought Content:  Thought content normal          Judgment: Judgment normal       Comments: Tearful                      Olena Us MD,   [facility]  6/28/2021

## 2021-06-29 NOTE — TELEPHONE ENCOUNTER
Please have him take a full tablet tomorrow morning losartan 25mg! And now take the extra a 1/2 tablet to bring it down   Continue to monitor and call us tomorrow at 1pm with readings

## 2021-06-29 NOTE — TELEPHONE ENCOUNTER
I left a detailed message   However I did ask that the pt call back so I can verify what the pt is currently taking and instructions as far as BP medications are concerned

## 2021-06-29 NOTE — TELEPHONE ENCOUNTER
CALLED PT HE TOOK 1/2 THE PILL AT 7 AM AND TOOK BP @ 9AM - 144/69, TOOK SECOND TIME @ 11:30AM - 142/66

## 2021-06-29 NOTE — TELEPHONE ENCOUNTER
Patient received the message  And will give us a call tomorrow and let you know if the change in medication dosage has helped with his BP

## 2021-06-30 NOTE — TELEPHONE ENCOUNTER
Pt states BPmedication increased to whole pill instead of a half pill BP 8:45am 144/68 10:30am 170/76 11:30am 162/ 68 pt states noticed wire on BP cuff loose 11:40 119/62 fixed loose wire on BP cuff 1pm 137/62 1:30 139/64

## 2021-07-01 NOTE — TELEPHONE ENCOUNTER
Yes I agree, have him take losartan 50mg ( meaning 2 tablets of the 25mgs)  Call us on Tuesday to tell us how his pressures are doing  Please tell me how is his COUGH?

## 2021-07-01 NOTE — TELEPHONE ENCOUNTER
Pt states he still has cough OTC cough drops advised to increase medication and keep BP log we will check in with him on Tuesday

## 2021-07-06 ENCOUNTER — TELEPHONE (OUTPATIENT)
Dept: FAMILY MEDICINE CLINIC | Facility: CLINIC | Age: 81
End: 2021-07-06

## 2021-07-06 NOTE — TELEPHONE ENCOUNTER
Pt Bp is running 153/57 today , his cough seems to be better , alittle phlegm   He has an appointment with you next week    He is still taking 2 tablets of losartan in the morning tc/cma

## 2021-07-06 NOTE — TELEPHONE ENCOUNTER
Dr Cece Bedoya,    Patient would like to talk to a nurse regarding his medications that he is taking    Please call

## 2021-07-12 ENCOUNTER — OFFICE VISIT (OUTPATIENT)
Dept: FAMILY MEDICINE CLINIC | Facility: CLINIC | Age: 81
End: 2021-07-12
Payer: COMMERCIAL

## 2021-07-12 VITALS
HEIGHT: 67 IN | BODY MASS INDEX: 25.49 KG/M2 | DIASTOLIC BLOOD PRESSURE: 86 MMHG | HEART RATE: 68 BPM | WEIGHT: 162.4 LBS | RESPIRATION RATE: 16 BRPM | SYSTOLIC BLOOD PRESSURE: 200 MMHG | TEMPERATURE: 96.7 F

## 2021-07-12 DIAGNOSIS — I10 ESSENTIAL HYPERTENSION: ICD-10-CM

## 2021-07-12 PROCEDURE — 99214 OFFICE O/P EST MOD 30 MIN: CPT | Performed by: FAMILY MEDICINE

## 2021-07-12 PROCEDURE — 3079F DIAST BP 80-89 MM HG: CPT | Performed by: FAMILY MEDICINE

## 2021-07-12 PROCEDURE — 1160F RVW MEDS BY RX/DR IN RCRD: CPT | Performed by: FAMILY MEDICINE

## 2021-07-12 PROCEDURE — 3077F SYST BP >= 140 MM HG: CPT | Performed by: FAMILY MEDICINE

## 2021-07-12 PROCEDURE — 1036F TOBACCO NON-USER: CPT | Performed by: FAMILY MEDICINE

## 2021-07-12 RX ORDER — LOSARTAN POTASSIUM 25 MG/1
75 TABLET ORAL DAILY
Qty: 42 TABLET | Refills: 0 | Status: SHIPPED | OUTPATIENT
Start: 2021-07-12 | End: 2021-07-12 | Stop reason: SDUPTHER

## 2021-07-12 RX ORDER — LOSARTAN POTASSIUM 25 MG/1
75 TABLET ORAL DAILY
Qty: 270 TABLET | Refills: 1 | Status: SHIPPED | OUTPATIENT
Start: 2021-07-12 | End: 2021-07-12 | Stop reason: SDUPTHER

## 2021-07-12 RX ORDER — LOSARTAN POTASSIUM 25 MG/1
75 TABLET ORAL DAILY
Qty: 42 TABLET | Refills: 0 | Status: SHIPPED | OUTPATIENT
Start: 2021-07-12 | End: 2021-07-26 | Stop reason: SDUPTHER

## 2021-07-12 NOTE — PROGRESS NOTES
Terence Merino 1940 male MRN: [id number]    FAMILY PRACTICE OFFICE VISIT  [facility] - [address]      ASSESSMENT/PLAN  Terence Merino is a 79 y o  male presents to the office for    Diagnoses and all orders for this visit:    Essential hypertension  -     Discontinue: losartan (COZAAR) 25 mg tablet; Take 3 tablets (75 mg total) by mouth daily  -     Discontinue: losartan (COZAAR) 25 mg tablet; Take 3 tablets (75 mg total) by mouth daily for 14 days  -     losartan (COZAAR) 25 mg tablet; Take 3 tablets (75 mg total) by mouth daily for 14 days       Started on losartan 75mgs daily, recommend that if there is no improvement to contact our office  Stress playing a role  Patient is to keep a low NA diet  And keep a home BP log  Future Appointments   Date Time Provider Armen Polanco   12/13/2021  9:00 AM Roseann Davison MD [facility]          SUBJECTIVE  CC: Hypertension      HPI:  Terence Merino is a 79 y o  male who presents for a follow-up appointment  Patient unfortunately is having a side effect to lisinopril he was demonstrating a cough that was misdiagnosed as an upper respiratory infection  Since then he was taking host defense mushrooms cordyceps and normal saline spray to reduce the cough  After discontinuing lisinopril the cough is completely resolved  Patient was started on losartan 25 mg but continued to have elevated blood pressures  Then had an another increase till losartan 50 mg continue to have elevations at home high systolic was 652-415  Diastolic seem to always be at baseline in the 60s and low 80s  Patient is asymptomatic except for social stressors at home  Review of Systems   Constitutional: Negative for activity change, appetite change, chills, fatigue and fever  HENT: Negative for congestion  Respiratory: Negative for cough, chest tightness and shortness of breath      Cardiovascular: Negative for chest pain and leg swelling  Gastrointestinal: Negative for abdominal distention, abdominal pain, constipation, diarrhea, nausea and vomiting  Psychiatric/Behavioral: Positive for sleep disturbance  The patient is nervous/anxious  All other systems reviewed and are negative  Historical Information   The patient history was reviewed as follows:  Past Medical History:   Diagnosis Date    Arthritis     Benign essential hypertension     Coronary artery disease     Hearing problem     Hx of CABG     Hypercholesteremia     Wears glasses     as needed    Wears hearing aid     bilateral aids         Medications:     Current Outpatient Medications:     aspirin 81 MG tablet, Take 1 tablet by mouth daily at bedtime , Disp: , Rfl:     atorvastatin (LIPITOR) 10 mg tablet, Take 1 tablet (10 mg total) by mouth daily, Disp: 90 tablet, Rfl: 3    B Complex Vitamins (VITAMIN B COMPLEX PO), Take by mouth every morning, Disp: , Rfl:     ibuprofen (ADVIL) 200 mg tablet, Take 1 tablet by mouth 3 (three) times a day as needed, Disp: , Rfl:     losartan (COZAAR) 25 mg tablet, Take 1 tablet (25 mg total) by mouth daily, Disp: 30 tablet, Rfl: 0    Multiple Vitamins-Minerals (MULTI-VITAMIN/MINERALS PO), Take 1 tablet by mouth every morning Powder[employer]- one scoop in H2O, Disp: , Rfl:     VITAMIN D PO, Take 2,000 Units by mouth every morning , Disp: , Rfl:     Allergies   Allergen Reactions    Acetaminophen Throat Swelling    Amoxicillin Rash    Lisinopril Cough       OBJECTIVE  Vitals:   Vitals:    07/12/21 0759 07/12/21 0802   BP: (!) 210/82 (!) 200/86   BP Location: Left arm Right arm   Patient Position: Sitting Sitting   Cuff Size: Large Large   Pulse: 68    Resp: 16    Temp: (!) 96 7 °F (35 9 °C)    Weight: 73 7 kg (162 lb 6 4 oz)    Height: 5' 6 5" (1 689 m)          Physical Exam  Vitals reviewed  Constitutional:       Appearance: He is well-developed  HENT:      Head: Normocephalic and atraumatic     Eyes: Conjunctiva/sclera: Conjunctivae normal       Pupils: Pupils are equal, round, and reactive to light  Cardiovascular:      Rate and Rhythm: Normal rate and regular rhythm  Heart sounds: Normal heart sounds  Pulmonary:      Effort: Pulmonary effort is normal  No respiratory distress  Breath sounds: Normal breath sounds  Musculoskeletal:         General: Normal range of motion  Cervical back: Normal range of motion and neck supple  Skin:     General: Skin is warm  Capillary Refill: Capillary refill takes less than 2 seconds  Neurological:      Mental Status: He is alert and oriented to person, place, and time  Psychiatric:         Mood and Affect: Mood normal          Thought Content:  Thought content normal                     Jerri Fox MD,   [facility]  7/12/2021

## 2021-07-21 ENCOUNTER — RA CDI HCC (OUTPATIENT)
Dept: OTHER | Facility: HOSPITAL | Age: 81
End: 2021-07-21

## 2021-07-21 NOTE — PROGRESS NOTES
[place] [address]  coding opportunities             Chart reviewed, (number of) suggestions sent to provider: 1     Problem listed updated  Provider Accepted, (number of) suggestions accepted: 1         Number of suggestions actually used: 1         Patients insurance company: [address]  (Medicare Advantage and [employer])     Visit status: Patient arrived for their scheduled appointment        [place] [address]  coding opportunities             Chart reviewed, (number of) suggestions sent to provider: 1     Problem listed updated   Provider Accepted, (number of) suggestions accepted: 1               Patients insurance company: [address]  (Medicare Advantage and [employer])           [place] [address]  coding opportunities             Chart reviewed, (number of) suggestions sent to provider: 1     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

## 2021-07-26 ENCOUNTER — OFFICE VISIT (OUTPATIENT)
Dept: FAMILY MEDICINE CLINIC | Facility: CLINIC | Age: 81
End: 2021-07-26
Payer: COMMERCIAL

## 2021-07-26 VITALS
HEART RATE: 72 BPM | OXYGEN SATURATION: 98 % | RESPIRATION RATE: 16 BRPM | TEMPERATURE: 97.9 F | DIASTOLIC BLOOD PRESSURE: 80 MMHG | SYSTOLIC BLOOD PRESSURE: 140 MMHG | HEIGHT: 67 IN | BODY MASS INDEX: 25.55 KG/M2 | WEIGHT: 162.8 LBS

## 2021-07-26 DIAGNOSIS — I25.10 CAD, MULTIPLE VESSEL: ICD-10-CM

## 2021-07-26 DIAGNOSIS — I10 ESSENTIAL HYPERTENSION: Primary | ICD-10-CM

## 2021-07-26 DIAGNOSIS — E72.12 METHYLENETETRAHYDROFOLATE REDUCTASE DEFICIENCY (HCC): ICD-10-CM

## 2021-07-26 DIAGNOSIS — E78.5 HYPERLIPIDEMIA, UNSPECIFIED HYPERLIPIDEMIA TYPE: ICD-10-CM

## 2021-07-26 PROCEDURE — 99214 OFFICE O/P EST MOD 30 MIN: CPT | Performed by: FAMILY MEDICINE

## 2021-07-26 PROCEDURE — 3079F DIAST BP 80-89 MM HG: CPT | Performed by: FAMILY MEDICINE

## 2021-07-26 PROCEDURE — 1160F RVW MEDS BY RX/DR IN RCRD: CPT | Performed by: FAMILY MEDICINE

## 2021-07-26 PROCEDURE — 1036F TOBACCO NON-USER: CPT | Performed by: FAMILY MEDICINE

## 2021-07-26 PROCEDURE — 3077F SYST BP >= 140 MM HG: CPT | Performed by: FAMILY MEDICINE

## 2021-07-26 RX ORDER — LOSARTAN POTASSIUM 100 MG/1
100 TABLET ORAL DAILY
Qty: 90 TABLET | Refills: 1 | Status: SHIPPED | OUTPATIENT
Start: 2021-07-26 | End: 2021-11-01 | Stop reason: SDUPTHER

## 2021-07-26 NOTE — PROGRESS NOTES
Fatimah Blake 1940 male MRN: [id number]    FAMILY PRACTICE OFFICE VISIT  [facility] - [address]      ASSESSMENT/PLAN  Fatimah Blake is a 79 y o  male presents to the office for    Diagnoses and all orders for this visit:    Essential hypertension  -     losartan (COZAAR) 100 MG tablet; Take 1 tablet (100 mg total) by mouth daily for 14 days  -     Basic metabolic panel; Future  -     Basic metabolic panel; Future    Hyperlipidemia, unspecified hyperlipidemia type    CAD, multiple vessel    Methylenetetrahydrofolate reductase deficiency ([address] )  -     Vitamin B12; Future       Recommend only continuing  Energy spark vitamin supplements and stop the extra B12   repeat B12 next month  repeat kidney function mid August/October  Increase losartan to 100 mg daily   recommend seeing his cardiologist mid October   hyperlipidemia currently controlled at her last appointment  Coronary artery disease history being managed by his cardiologist   if blood pressure is overtreated or undertreated patient is aware to contact our office at any time for adjustment    Postnasal drip recommend taking Allegra as needed         Future Appointments   Date Time Provider Armen Polanco   12/13/2021  9:00 AM Meseret Ariza MD [address]  CC: Blood Pressure Check (patient here for a BP check)      HPI:  Fatimah Blake is a 79 y o  male who presents for  An acute appointment  Patient states that his blood pressures at home have been ranging elevated  Systolic 623-316/ diastolic 21-78  Patient states that he found out that his friend  Passed away at her last appointment  Patient states that he has been seeing the same cardiologist for years  Did have 2 blockages that were corrected at that time  Only had chest pain on exertion at that time  Currently is asymptomatic of any chest pain or shortness of breath    Patient's cholesterol he has been taking his atorvastatin 10 mg without any side effects  Patient does not know if he is taking too much B12 supplements  Review of Systems   Constitutional: Positive for fatigue  Negative for activity change, appetite change, chills and fever  HENT: Negative for congestion  Respiratory: Negative for cough, chest tightness and shortness of breath  Cardiovascular: Negative for chest pain and leg swelling  Gastrointestinal: Negative for abdominal distention, abdominal pain, constipation, diarrhea, nausea and vomiting  All other systems reviewed and are negative        Historical Information   The patient history was reviewed as follows:  Past Medical History:   Diagnosis Date    Arthritis     Benign essential hypertension     Coronary artery disease     Hearing problem     Hx of CABG     Hypercholesteremia     Wears glasses     as needed    Wears hearing aid     bilateral aids         Medications:     Current Outpatient Medications:     aspirin 81 MG tablet, Take 1 tablet by mouth daily at bedtime , Disp: , Rfl:     atorvastatin (LIPITOR) 10 mg tablet, Take 1 tablet (10 mg total) by mouth daily, Disp: 90 tablet, Rfl: 3    B Complex Vitamins (VITAMIN B COMPLEX PO), Take by mouth every morning, Disp: , Rfl:     ibuprofen (ADVIL) 200 mg tablet, Take 1 tablet by mouth 3 (three) times a day as needed, Disp: , Rfl:     losartan (COZAAR) 25 mg tablet, Take 3 tablets (75 mg total) by mouth daily for 14 days, Disp: 42 tablet, Rfl: 0    Multiple Vitamins-Minerals (MULTI-VITAMIN/MINERALS PO), Take 1 tablet by mouth every morning Powder[employer]- one scoop in H2O, Disp: , Rfl:     VITAMIN D PO, Take 2,000 Units by mouth every morning , Disp: , Rfl:     Allergies   Allergen Reactions    Acetaminophen Throat Swelling    Amoxicillin Rash    Lisinopril Cough       OBJECTIVE  Vitals:   Vitals:    07/26/21 0910   BP: 124/68   BP Location: Left arm   Patient Position: Sitting   Cuff Size: Standard   Pulse: 72 Resp: 16   Temp: 97 9 °F (36 6 °C)   TempSrc: Temporal   SpO2: 98%   Weight: 73 8 kg (162 lb 12 8 oz)   Height: 5' 6 5" (1 689 m)         Physical Exam  Vitals reviewed  Constitutional:       Appearance: He is well-developed  HENT:      Head: Normocephalic and atraumatic  Mouth/Throat:      Pharynx: Posterior oropharyngeal erythema present  Eyes:      Conjunctiva/sclera: Conjunctivae normal       Pupils: Pupils are equal, round, and reactive to light  Cardiovascular:      Rate and Rhythm: Normal rate and regular rhythm  Heart sounds: Normal heart sounds, S1 normal and S2 normal  No murmur heard  Pulmonary:      Effort: Pulmonary effort is normal  No respiratory distress  Breath sounds: Normal breath sounds  No wheezing  Musculoskeletal:         General: Normal range of motion  Cervical back: Normal range of motion and neck supple  Skin:     General: Skin is warm  Neurological:      Mental Status: He is alert and oriented to person, place, and time  Psychiatric:         Speech: Speech normal          Behavior: Behavior normal          Thought Content:  Thought content normal          Judgment: Judgment normal                     Tra Glover MD,   [facility]  7/26/2021

## 2021-08-13 ENCOUNTER — LAB (OUTPATIENT)
Dept: LAB | Facility: CLINIC | Age: 81
End: 2021-08-13
Payer: COMMERCIAL

## 2021-08-13 DIAGNOSIS — E72.12 METHYLENETETRAHYDROFOLATE REDUCTASE DEFICIENCY (HCC): ICD-10-CM

## 2021-08-13 DIAGNOSIS — I10 ESSENTIAL HYPERTENSION: ICD-10-CM

## 2021-08-13 LAB
ANION GAP SERPL CALCULATED.3IONS-SCNC: 7 MMOL/L (ref 4–13)
BUN SERPL-MCNC: 25 MG/DL (ref 5–25)
CALCIUM SERPL-MCNC: 8.9 MG/DL (ref 8.3–10.1)
CHLORIDE SERPL-SCNC: 109 MMOL/L (ref 100–108)
CO2 SERPL-SCNC: 24 MMOL/L (ref 21–32)
CREAT SERPL-MCNC: 1.25 MG/DL (ref 0.6–1.3)
GFR SERPL CREATININE-BSD FRML MDRD: 54 ML/MIN/1.73SQ M
GLUCOSE P FAST SERPL-MCNC: 107 MG/DL (ref 65–99)
POTASSIUM SERPL-SCNC: 4.1 MMOL/L (ref 3.5–5.3)
SODIUM SERPL-SCNC: 140 MMOL/L (ref 136–145)
VIT B12 SERPL-MCNC: 693 PG/ML (ref 100–900)

## 2021-08-13 PROCEDURE — 80048 BASIC METABOLIC PNL TOTAL CA: CPT

## 2021-08-13 PROCEDURE — 36415 COLL VENOUS BLD VENIPUNCTURE: CPT

## 2021-08-13 PROCEDURE — 82607 VITAMIN B-12: CPT

## 2021-08-26 ENCOUNTER — TELEPHONE (OUTPATIENT)
Dept: CARDIOLOGY CLINIC | Facility: CLINIC | Age: 81
End: 2021-08-26

## 2021-08-26 NOTE — TELEPHONE ENCOUNTER
Patient called and wanted to confirm that you are ok with the medication change that Dr Izaguirre  did on his losartan  increased to 100mg    Please reach him with any questions or concerns at his home number [phone]

## 2021-10-04 DIAGNOSIS — E78.5 HYPERLIPIDEMIA, UNSPECIFIED HYPERLIPIDEMIA TYPE: ICD-10-CM

## 2021-10-05 ENCOUNTER — LAB (OUTPATIENT)
Dept: LAB | Facility: CLINIC | Age: 81
End: 2021-10-05
Payer: COMMERCIAL

## 2021-10-05 DIAGNOSIS — I10 ESSENTIAL HYPERTENSION: ICD-10-CM

## 2021-10-05 DIAGNOSIS — E78.5 HYPERLIPIDEMIA, UNSPECIFIED HYPERLIPIDEMIA TYPE: ICD-10-CM

## 2021-10-05 LAB
ANION GAP SERPL CALCULATED.3IONS-SCNC: 5 MMOL/L (ref 4–13)
BUN SERPL-MCNC: 23 MG/DL (ref 5–25)
CALCIUM SERPL-MCNC: 9.8 MG/DL (ref 8.3–10.1)
CHLORIDE SERPL-SCNC: 108 MMOL/L (ref 100–108)
CHOLEST SERPL-MCNC: 111 MG/DL (ref 50–200)
CO2 SERPL-SCNC: 27 MMOL/L (ref 21–32)
CREAT SERPL-MCNC: 1.19 MG/DL (ref 0.6–1.3)
GFR SERPL CREATININE-BSD FRML MDRD: 58 ML/MIN/1.73SQ M
GLUCOSE P FAST SERPL-MCNC: 110 MG/DL (ref 65–99)
HDLC SERPL-MCNC: 32 MG/DL
LDLC SERPL CALC-MCNC: 61 MG/DL (ref 0–100)
NONHDLC SERPL-MCNC: 79 MG/DL
POTASSIUM SERPL-SCNC: 4 MMOL/L (ref 3.5–5.3)
SODIUM SERPL-SCNC: 140 MMOL/L (ref 136–145)
TRIGL SERPL-MCNC: 92 MG/DL

## 2021-10-05 PROCEDURE — 36415 COLL VENOUS BLD VENIPUNCTURE: CPT

## 2021-10-05 PROCEDURE — 80048 BASIC METABOLIC PNL TOTAL CA: CPT

## 2021-10-05 PROCEDURE — 80061 LIPID PANEL: CPT

## 2021-10-05 RX ORDER — ATORVASTATIN CALCIUM 10 MG/1
10 TABLET, FILM COATED ORAL DAILY
Qty: 90 TABLET | Refills: 3 | Status: SHIPPED | OUTPATIENT
Start: 2021-10-05

## 2021-10-15 ENCOUNTER — OFFICE VISIT (OUTPATIENT)
Dept: CARDIOLOGY CLINIC | Facility: CLINIC | Age: 81
End: 2021-10-15
Payer: COMMERCIAL

## 2021-10-15 ENCOUNTER — CLINICAL SUPPORT (OUTPATIENT)
Dept: FAMILY MEDICINE CLINIC | Facility: CLINIC | Age: 81
End: 2021-10-15
Payer: COMMERCIAL

## 2021-10-15 VITALS
WEIGHT: 158 LBS | SYSTOLIC BLOOD PRESSURE: 138 MMHG | OXYGEN SATURATION: 98 % | DIASTOLIC BLOOD PRESSURE: 82 MMHG | TEMPERATURE: 97.7 F | BODY MASS INDEX: 24.8 KG/M2 | HEART RATE: 56 BPM | HEIGHT: 67 IN

## 2021-10-15 DIAGNOSIS — Z23 NEED FOR VACCINATION: Primary | ICD-10-CM

## 2021-10-15 DIAGNOSIS — I10 ESSENTIAL HYPERTENSION: ICD-10-CM

## 2021-10-15 DIAGNOSIS — Z95.1 S/P CABG X 4: ICD-10-CM

## 2021-10-15 DIAGNOSIS — I25.119 CORONARY ARTERY DISEASE INVOLVING NATIVE CORONARY ARTERY OF NATIVE HEART WITH ANGINA PECTORIS (HCC): Primary | ICD-10-CM

## 2021-10-15 DIAGNOSIS — E78.5 HYPERLIPIDEMIA, UNSPECIFIED HYPERLIPIDEMIA TYPE: ICD-10-CM

## 2021-10-15 DIAGNOSIS — Z15.89 MTHFR MUTATION: ICD-10-CM

## 2021-10-15 PROCEDURE — 3079F DIAST BP 80-89 MM HG: CPT | Performed by: INTERNAL MEDICINE

## 2021-10-15 PROCEDURE — 3075F SYST BP GE 130 - 139MM HG: CPT | Performed by: INTERNAL MEDICINE

## 2021-10-15 PROCEDURE — 1036F TOBACCO NON-USER: CPT | Performed by: INTERNAL MEDICINE

## 2021-10-15 PROCEDURE — 99214 OFFICE O/P EST MOD 30 MIN: CPT | Performed by: INTERNAL MEDICINE

## 2021-10-15 PROCEDURE — 90662 IIV NO PRSV INCREASED AG IM: CPT

## 2021-10-15 PROCEDURE — 93000 ELECTROCARDIOGRAM COMPLETE: CPT | Performed by: INTERNAL MEDICINE

## 2021-10-15 PROCEDURE — 1160F RVW MEDS BY RX/DR IN RCRD: CPT | Performed by: INTERNAL MEDICINE

## 2021-10-15 PROCEDURE — G0008 ADMIN INFLUENZA VIRUS VAC: HCPCS

## 2021-11-01 DIAGNOSIS — I10 ESSENTIAL HYPERTENSION: ICD-10-CM

## 2021-11-01 RX ORDER — LOSARTAN POTASSIUM 100 MG/1
100 TABLET ORAL DAILY
Qty: 90 TABLET | Refills: 1 | Status: SHIPPED | OUTPATIENT
Start: 2021-11-01 | End: 2022-06-16 | Stop reason: SDUPTHER

## 2021-11-18 ENCOUNTER — TELEPHONE (OUTPATIENT)
Dept: FAMILY MEDICINE CLINIC | Facility: CLINIC | Age: 81
End: 2021-11-18

## 2021-11-29 ENCOUNTER — OFFICE VISIT (OUTPATIENT)
Dept: FAMILY MEDICINE CLINIC | Facility: CLINIC | Age: 81
End: 2021-11-29
Payer: COMMERCIAL

## 2021-11-29 VITALS
DIASTOLIC BLOOD PRESSURE: 80 MMHG | OXYGEN SATURATION: 100 % | HEART RATE: 68 BPM | BODY MASS INDEX: 25.11 KG/M2 | HEIGHT: 67 IN | SYSTOLIC BLOOD PRESSURE: 130 MMHG | WEIGHT: 160 LBS | TEMPERATURE: 97.6 F | RESPIRATION RATE: 16 BRPM

## 2021-11-29 DIAGNOSIS — M54.2 NECK PAIN: ICD-10-CM

## 2021-11-29 DIAGNOSIS — Z00.00 MEDICARE ANNUAL WELLNESS VISIT, SUBSEQUENT: Primary | ICD-10-CM

## 2021-11-29 DIAGNOSIS — Z12.5 SCREENING PSA (PROSTATE SPECIFIC ANTIGEN): ICD-10-CM

## 2021-11-29 PROCEDURE — G0439 PPPS, SUBSEQ VISIT: HCPCS | Performed by: FAMILY MEDICINE

## 2021-12-02 ENCOUNTER — TELEPHONE (OUTPATIENT)
Dept: FAMILY MEDICINE CLINIC | Facility: CLINIC | Age: 81
End: 2021-12-02

## 2021-12-02 ENCOUNTER — EVALUATION (OUTPATIENT)
Dept: PHYSICAL THERAPY | Facility: CLINIC | Age: 81
End: 2021-12-02
Payer: COMMERCIAL

## 2021-12-02 ENCOUNTER — APPOINTMENT (OUTPATIENT)
Dept: LAB | Facility: CLINIC | Age: 81
End: 2021-12-02
Payer: COMMERCIAL

## 2021-12-02 DIAGNOSIS — M54.2 NECK PAIN: ICD-10-CM

## 2021-12-02 DIAGNOSIS — Z12.5 SCREENING PSA (PROSTATE SPECIFIC ANTIGEN): ICD-10-CM

## 2021-12-02 DIAGNOSIS — E83.118 OTHER HEMOCHROMATOSIS: ICD-10-CM

## 2021-12-02 LAB
ALBUMIN SERPL BCP-MCNC: 3.6 G/DL (ref 3.5–5)
ALP SERPL-CCNC: 50 U/L (ref 46–116)
ALT SERPL W P-5'-P-CCNC: 26 U/L (ref 12–78)
ANION GAP SERPL CALCULATED.3IONS-SCNC: 4 MMOL/L (ref 4–13)
AST SERPL W P-5'-P-CCNC: 19 U/L (ref 5–45)
BASOPHILS # BLD AUTO: 0.07 THOUSANDS/ΜL (ref 0–0.1)
BASOPHILS NFR BLD AUTO: 1 % (ref 0–1)
BILIRUB SERPL-MCNC: 1.01 MG/DL (ref 0.2–1)
BUN SERPL-MCNC: 17 MG/DL (ref 5–25)
CALCIUM SERPL-MCNC: 9.1 MG/DL (ref 8.3–10.1)
CHLORIDE SERPL-SCNC: 108 MMOL/L (ref 100–108)
CO2 SERPL-SCNC: 28 MMOL/L (ref 21–32)
CREAT SERPL-MCNC: 1.27 MG/DL (ref 0.6–1.3)
EOSINOPHIL # BLD AUTO: 0.18 THOUSAND/ΜL (ref 0–0.61)
EOSINOPHIL NFR BLD AUTO: 3 % (ref 0–6)
ERYTHROCYTE [DISTWIDTH] IN BLOOD BY AUTOMATED COUNT: 12.3 % (ref 11.6–15.1)
FERRITIN SERPL-MCNC: 145 NG/ML (ref 8–388)
GFR SERPL CREATININE-BSD FRML MDRD: 53 ML/MIN/1.73SQ M
GLUCOSE P FAST SERPL-MCNC: 102 MG/DL (ref 65–99)
HCT VFR BLD AUTO: 42.8 % (ref 36.5–49.3)
HGB BLD-MCNC: 14.1 G/DL (ref 12–17)
IMM GRANULOCYTES # BLD AUTO: 0.02 THOUSAND/UL (ref 0–0.2)
IMM GRANULOCYTES NFR BLD AUTO: 0 % (ref 0–2)
IRON SATN MFR SERPL: 45 % (ref 20–50)
IRON SERPL-MCNC: 124 UG/DL (ref 65–175)
LYMPHOCYTES # BLD AUTO: 1.4 THOUSANDS/ΜL (ref 0.6–4.47)
LYMPHOCYTES NFR BLD AUTO: 25 % (ref 14–44)
MCH RBC QN AUTO: 29.4 PG (ref 26.8–34.3)
MCHC RBC AUTO-ENTMCNC: 32.9 G/DL (ref 31.4–37.4)
MCV RBC AUTO: 89 FL (ref 82–98)
MONOCYTES # BLD AUTO: 0.55 THOUSAND/ΜL (ref 0.17–1.22)
MONOCYTES NFR BLD AUTO: 10 % (ref 4–12)
NEUTROPHILS # BLD AUTO: 3.49 THOUSANDS/ΜL (ref 1.85–7.62)
NEUTS SEG NFR BLD AUTO: 61 % (ref 43–75)
NRBC BLD AUTO-RTO: 0 /100 WBCS
PLATELET # BLD AUTO: 264 THOUSANDS/UL (ref 149–390)
PMV BLD AUTO: 10.3 FL (ref 8.9–12.7)
POTASSIUM SERPL-SCNC: 4.1 MMOL/L (ref 3.5–5.3)
PROT SERPL-MCNC: 7.6 G/DL (ref 6.4–8.2)
PSA SERPL-MCNC: 1.2 NG/ML (ref 0–4)
RBC # BLD AUTO: 4.79 MILLION/UL (ref 3.88–5.62)
SODIUM SERPL-SCNC: 140 MMOL/L (ref 136–145)
TIBC SERPL-MCNC: 277 UG/DL (ref 250–450)
WBC # BLD AUTO: 5.71 THOUSAND/UL (ref 4.31–10.16)

## 2021-12-02 PROCEDURE — G0103 PSA SCREENING: HCPCS

## 2021-12-02 PROCEDURE — 80053 COMPREHEN METABOLIC PANEL: CPT

## 2021-12-02 PROCEDURE — 82728 ASSAY OF FERRITIN: CPT

## 2021-12-02 PROCEDURE — 83550 IRON BINDING TEST: CPT

## 2021-12-02 PROCEDURE — 97161 PT EVAL LOW COMPLEX 20 MIN: CPT

## 2021-12-02 PROCEDURE — 36415 COLL VENOUS BLD VENIPUNCTURE: CPT

## 2021-12-02 PROCEDURE — 83540 ASSAY OF IRON: CPT

## 2021-12-02 PROCEDURE — 85025 COMPLETE CBC W/AUTO DIFF WBC: CPT

## 2021-12-06 ENCOUNTER — OFFICE VISIT (OUTPATIENT)
Dept: PHYSICAL THERAPY | Facility: CLINIC | Age: 81
End: 2021-12-06
Payer: COMMERCIAL

## 2021-12-06 DIAGNOSIS — M54.2 NECK PAIN: Primary | ICD-10-CM

## 2021-12-06 PROCEDURE — 97112 NEUROMUSCULAR REEDUCATION: CPT

## 2021-12-06 PROCEDURE — 97140 MANUAL THERAPY 1/> REGIONS: CPT

## 2021-12-06 PROCEDURE — 97110 THERAPEUTIC EXERCISES: CPT

## 2021-12-09 ENCOUNTER — OFFICE VISIT (OUTPATIENT)
Dept: PHYSICAL THERAPY | Facility: CLINIC | Age: 81
End: 2021-12-09
Payer: COMMERCIAL

## 2021-12-09 DIAGNOSIS — M54.2 NECK PAIN: Primary | ICD-10-CM

## 2021-12-09 PROCEDURE — 97112 NEUROMUSCULAR REEDUCATION: CPT

## 2021-12-09 PROCEDURE — 97140 MANUAL THERAPY 1/> REGIONS: CPT

## 2021-12-09 PROCEDURE — 97110 THERAPEUTIC EXERCISES: CPT

## 2021-12-13 ENCOUNTER — OFFICE VISIT (OUTPATIENT)
Dept: PHYSICAL THERAPY | Facility: CLINIC | Age: 81
End: 2021-12-13
Payer: COMMERCIAL

## 2021-12-13 ENCOUNTER — OFFICE VISIT (OUTPATIENT)
Dept: HEMATOLOGY ONCOLOGY | Facility: MEDICAL CENTER | Age: 81
End: 2021-12-13
Payer: COMMERCIAL

## 2021-12-13 VITALS
DIASTOLIC BLOOD PRESSURE: 80 MMHG | RESPIRATION RATE: 16 BRPM | BODY MASS INDEX: 25.71 KG/M2 | TEMPERATURE: 96.4 F | SYSTOLIC BLOOD PRESSURE: 138 MMHG | HEART RATE: 62 BPM | WEIGHT: 160 LBS | OXYGEN SATURATION: 98 % | HEIGHT: 66 IN

## 2021-12-13 DIAGNOSIS — E83.118 OTHER HEMOCHROMATOSIS: ICD-10-CM

## 2021-12-13 DIAGNOSIS — M54.2 NECK PAIN: Primary | ICD-10-CM

## 2021-12-13 DIAGNOSIS — R79.89 ELEVATED LFTS: Primary | ICD-10-CM

## 2021-12-13 PROCEDURE — 97140 MANUAL THERAPY 1/> REGIONS: CPT

## 2021-12-13 PROCEDURE — 99214 OFFICE O/P EST MOD 30 MIN: CPT | Performed by: INTERNAL MEDICINE

## 2021-12-13 PROCEDURE — 3079F DIAST BP 80-89 MM HG: CPT | Performed by: INTERNAL MEDICINE

## 2021-12-13 PROCEDURE — 1036F TOBACCO NON-USER: CPT | Performed by: INTERNAL MEDICINE

## 2021-12-13 PROCEDURE — 97112 NEUROMUSCULAR REEDUCATION: CPT

## 2021-12-13 PROCEDURE — 97110 THERAPEUTIC EXERCISES: CPT

## 2021-12-13 PROCEDURE — 1160F RVW MEDS BY RX/DR IN RCRD: CPT | Performed by: INTERNAL MEDICINE

## 2021-12-13 PROCEDURE — 3075F SYST BP GE 130 - 139MM HG: CPT | Performed by: INTERNAL MEDICINE

## 2021-12-16 ENCOUNTER — OFFICE VISIT (OUTPATIENT)
Dept: PHYSICAL THERAPY | Facility: CLINIC | Age: 81
End: 2021-12-16
Payer: COMMERCIAL

## 2021-12-16 DIAGNOSIS — M54.2 NECK PAIN: Primary | ICD-10-CM

## 2021-12-16 PROCEDURE — 97140 MANUAL THERAPY 1/> REGIONS: CPT

## 2021-12-16 PROCEDURE — 97110 THERAPEUTIC EXERCISES: CPT

## 2021-12-16 PROCEDURE — 97112 NEUROMUSCULAR REEDUCATION: CPT

## 2021-12-20 ENCOUNTER — APPOINTMENT (OUTPATIENT)
Dept: PHYSICAL THERAPY | Facility: CLINIC | Age: 81
End: 2021-12-20
Payer: COMMERCIAL

## 2021-12-23 ENCOUNTER — OFFICE VISIT (OUTPATIENT)
Dept: PHYSICAL THERAPY | Facility: CLINIC | Age: 81
End: 2021-12-23
Payer: COMMERCIAL

## 2021-12-23 DIAGNOSIS — M54.2 NECK PAIN: Primary | ICD-10-CM

## 2021-12-23 PROCEDURE — 97112 NEUROMUSCULAR REEDUCATION: CPT

## 2021-12-23 PROCEDURE — 97110 THERAPEUTIC EXERCISES: CPT

## 2021-12-23 PROCEDURE — 97140 MANUAL THERAPY 1/> REGIONS: CPT

## 2021-12-27 ENCOUNTER — OFFICE VISIT (OUTPATIENT)
Dept: PHYSICAL THERAPY | Facility: CLINIC | Age: 81
End: 2021-12-27
Payer: COMMERCIAL

## 2021-12-27 DIAGNOSIS — M54.2 NECK PAIN: Primary | ICD-10-CM

## 2021-12-27 PROCEDURE — 97140 MANUAL THERAPY 1/> REGIONS: CPT

## 2021-12-27 PROCEDURE — 97110 THERAPEUTIC EXERCISES: CPT

## 2021-12-27 PROCEDURE — 97112 NEUROMUSCULAR REEDUCATION: CPT

## 2021-12-30 ENCOUNTER — APPOINTMENT (OUTPATIENT)
Dept: PHYSICAL THERAPY | Facility: CLINIC | Age: 81
End: 2021-12-30
Payer: COMMERCIAL

## 2022-01-03 ENCOUNTER — TELEPHONE (OUTPATIENT)
Dept: FAMILY MEDICINE CLINIC | Facility: CLINIC | Age: 82
End: 2022-01-03

## 2022-01-03 PROCEDURE — U0005 INFEC AGEN DETEC AMPLI PROBE: HCPCS | Performed by: FAMILY MEDICINE

## 2022-01-03 PROCEDURE — U0003 INFECTIOUS AGENT DETECTION BY NUCLEIC ACID (DNA OR RNA); SEVERE ACUTE RESPIRATORY SYNDROME CORONAVIRUS 2 (SARS-COV-2) (CORONAVIRUS DISEASE [COVID-19]), AMPLIFIED PROBE TECHNIQUE, MAKING USE OF HIGH THROUGHPUT TECHNOLOGIES AS DESCRIBED BY CMS-2020-01-R: HCPCS | Performed by: FAMILY MEDICINE

## 2022-01-03 NOTE — TELEPHONE ENCOUNTER
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

## 2022-01-03 NOTE — TELEPHONE ENCOUNTER
Exposed on 12/25; advised to come into the office for a swab  Nursing schedule will perform myself   No symtomatic OK to enter office

## 2022-01-05 ENCOUNTER — TELEPHONE (OUTPATIENT)
Dept: FAMILY MEDICINE CLINIC | Facility: CLINIC | Age: 82
End: 2022-01-05

## 2022-01-05 NOTE — TELEPHONE ENCOUNTER
Dec 26th patient got together with family  1 kid had a fever  Wednesday 29th patient felt like chills, fatigue  Continues to have congestion but very normal for him   Since positive recommend Booster being pushed till end of January

## 2022-03-04 ENCOUNTER — TELEPHONE (OUTPATIENT)
Dept: HEMATOLOGY ONCOLOGY | Facility: CLINIC | Age: 82
End: 2022-03-04

## 2022-03-04 NOTE — TELEPHONE ENCOUNTER
Per OV note:  As above, Mr Karen Nuñez feels well  Patient can undergo phlebotomy once between now and next year if patient wishes  Is not absolutely necessary but Mr Karen Nuñez understands that he will likely require phlebotomy within the next few years anyway    Patient is otherwise to return in 1 year with blood work before      Will call patient to discuss

## 2022-03-04 NOTE — TELEPHONE ENCOUNTER
Patient called in stating he needs to get a pint of blood removed from his system per Dr Jessica Cornelius  Patient is unsure if he needs a script and is unsure where he can go to get blood removed  Patient can be reached back at [phone]

## 2022-04-01 ENCOUNTER — OFFICE VISIT (OUTPATIENT)
Dept: CARDIOLOGY CLINIC | Facility: CLINIC | Age: 82
End: 2022-04-01
Payer: COMMERCIAL

## 2022-04-01 VITALS
OXYGEN SATURATION: 96 % | WEIGHT: 162 LBS | DIASTOLIC BLOOD PRESSURE: 60 MMHG | HEIGHT: 66 IN | HEART RATE: 62 BPM | BODY MASS INDEX: 26.03 KG/M2 | TEMPERATURE: 98.7 F | SYSTOLIC BLOOD PRESSURE: 130 MMHG

## 2022-04-01 DIAGNOSIS — I25.119 CORONARY ARTERY DISEASE INVOLVING NATIVE CORONARY ARTERY OF NATIVE HEART WITH ANGINA PECTORIS (HCC): ICD-10-CM

## 2022-04-01 DIAGNOSIS — I25.10 CAD, MULTIPLE VESSEL: Primary | ICD-10-CM

## 2022-04-01 DIAGNOSIS — E78.5 HYPERLIPIDEMIA, UNSPECIFIED HYPERLIPIDEMIA TYPE: ICD-10-CM

## 2022-04-01 DIAGNOSIS — Z95.1 S/P CABG X 4: ICD-10-CM

## 2022-04-01 PROCEDURE — 1036F TOBACCO NON-USER: CPT | Performed by: INTERNAL MEDICINE

## 2022-04-01 PROCEDURE — 3078F DIAST BP <80 MM HG: CPT | Performed by: INTERNAL MEDICINE

## 2022-04-01 PROCEDURE — 99214 OFFICE O/P EST MOD 30 MIN: CPT | Performed by: INTERNAL MEDICINE

## 2022-04-01 PROCEDURE — 3075F SYST BP GE 130 - 139MM HG: CPT | Performed by: INTERNAL MEDICINE

## 2022-04-01 PROCEDURE — 93000 ELECTROCARDIOGRAM COMPLETE: CPT | Performed by: INTERNAL MEDICINE

## 2022-04-01 PROCEDURE — 1160F RVW MEDS BY RX/DR IN RCRD: CPT | Performed by: INTERNAL MEDICINE

## 2022-04-01 RX ORDER — B-COMPLEX WITH VITAMIN C
TABLET ORAL
COMMUNITY

## 2022-04-01 RX ORDER — VITAMIN B COMPLEX
TABLET ORAL
COMMUNITY

## 2022-04-01 NOTE — PROGRESS NOTES
Quirino Connelly  1940  [id number]  [facility] Jaquan Bourne  [address] [id number]    Interval History:  Quirino Connelly is a [de-identified] y o  male who presents for follow up of coronary artery disease  Since his last visit, he has been feeling well   he denies any palpitations, chest pain, shortness of breath, LE edema, orthopnea or PND  Diet is overall unchanged  There has not been a significant change in weight  He has been taking care of his wife with dementia and has increased stress due to that  He exercises with a treadmill  His last blood work was normal   His last LDL cholesterol was 61 mg/dL  HDL was low at 32 mg/dL    Previously, he underwent coronary artery bypass grafting on January 15, 2015  He had LIMA to LAD, SVG to PDA, SVG to OM  Catheterization was done prior to this for evaluation of chest pain  He had a 70% left main stenosis, 60% LAD stenosis, 80% circumflex disease and 90% PDA stenosis  Surgery had no complications  Cardiac risk factors include advanced age (older than 54 for men, 72 for women), dyslipidemia and male gender  Past Medical History:   Diagnosis Date    Arthritis     Benign essential hypertension     Coronary artery disease     Hearing problem     Hx of CABG     Hypercholesteremia     Wears glasses     as needed    Wears hearing aid     bilateral aids     Past Surgical History:   Procedure Laterality Date    COLONOSCOPY  01/13/2021    CORONARY ARTERY BYPASS GRAFT      triple    EGD      stomach polyp    HERNIA REPAIR Bilateral     inguinal    KNEE SURGERY Right     ligament    LASIK  2000    WA XCAPSL CTRC RMVL INSJ IO LENS PROSTH W/O ECP Left 2/8/2021    Procedure: EXTRACTION EXTRACAPSULAR CATARACT PHACO INTRAOCULAR LENS (IOL);   Surgeon: Damir Mcmahon MD;  Location: [facility] MAIN OR;  Service: Ophthalmology    WA XCAPSL CTRC RMVL INSJ IO LENS PROSTH W/O ECP Right 3/15/2021 Procedure: EXTRACTION EXTRACAPSULAR CATARACT PHACO INTRAOCULAR LENS (IOL);   Surgeon: Lawanda Aquino MD;  Location[facility] MAIN OR;  Service: Ophthalmology     Social History     Socioeconomic History    Marital status: /Civil Union     Spouse name: Not on file    Number of children: Not on file    Years of education: Not on file    Highest education level: Not on file   Occupational History    Not on file   Tobacco Use    Smoking status: Former Smoker     Quit date: 1970     Years since quittin 2    Smokeless tobacco: Never Used   Vaping Use    Vaping Use: Never used   Substance and Sexual Activity    Alcohol use: Not Currently     Comment: quit     Drug use: Never    Sexual activity: Not on file   Other Topics Concern    Not on file   Social History Narrative    Not on file     Social Determinants of Health     Financial Resource Strain: Not on file   Food Insecurity: Not on file   Transportation Needs: Not on file   Physical Activity: Not on file   Stress: Not on file   Social Connections: Not on file   Intimate Partner Violence: Not on file   Housing Stability: Not on file     Family History   Problem Relation Age of Onset    Hypertension Mother     Coronary artery disease Father     Stroke Father     Aneurysm Brother         abdominal aortic aneurysm       Current Outpatient Medications:     aspirin 81 MG tablet, Take 1 tablet by mouth daily at bedtime , Disp: , Rfl:     atorvastatin (LIPITOR) 10 mg tablet, Take 1 tablet (10 mg total) by mouth daily, Disp: 90 tablet, Rfl: 3    Coenzyme Q10 (CoQ10) 100 MG CAPS, Take by mouth, Disp: , Rfl:     ibuprofen (ADVIL) 200 mg tablet, Take 1 tablet by mouth 3 (three) times a day as needed, Disp: , Rfl:     Multiple Vitamins-Minerals (MULTI-VITAMIN/MINERALS PO), Take 1 tablet by mouth every morning Powder-[employer]- one scoop in H2O, Disp: , Rfl:     VITAMIN D PO, Take 2,000 Units by mouth every morning , Disp: , Rfl:     Zinc 100 MG TABS, Take by mouth, Disp: , Rfl:     losartan (COZAAR) 100 MG tablet, Take 1 tablet (100 mg total) by mouth daily for 14 days, Disp: 90 tablet, Rfl: 1  The following portions of the patient's history were reviewed and updated as appropriate: allergies, current medications, past family history, past medical history, past social history, past surgical history and problem list     Review of Systems:  Review of Systems   Respiratory: Negative for shortness of breath  Cardiovascular: Negative for chest pain, palpitations and leg swelling  Musculoskeletal: Positive for arthralgias and myalgias  All other systems reviewed and are negative  Physical Exam:  /60 (BP Location: Left arm, Patient Position: Sitting, Cuff Size: Large)   Pulse 62   Temp 98 7 °F (37 1 °C)   Ht 5' 6" (1 676 m)   Wt 73 5 kg (162 lb)   SpO2 96%   BMI 26 15 kg/m²   Physical Exam  Constitutional:       General: He is not in acute distress  Appearance: He is well-developed  He is not diaphoretic  HENT:      Head: Normocephalic and atraumatic  Eyes:      Conjunctiva/sclera: Conjunctivae normal       Pupils: Pupils are equal, round, and reactive to light  Neck:      Thyroid: No thyromegaly  Vascular: No JVD  Cardiovascular:      Rate and Rhythm: Normal rate and regular rhythm  Heart sounds: Normal heart sounds  No murmur heard  No friction rub  No gallop  Pulmonary:      Effort: Pulmonary effort is normal       Breath sounds: Normal breath sounds  Musculoskeletal:      Cervical back: Neck supple  Skin:     General: Skin is warm and dry  Findings: No erythema or rash  Neurological:      General: No focal deficit present  Mental Status: He is alert and oriented to person, place, and time  Cranial Nerves: No cranial nerve deficit  Psychiatric:         Mood and Affect: Mood normal          Behavior: Behavior normal          Thought Content:  Thought content normal          Judgment: Judgment normal        Cardiographics  ECG: normal sinus rhythm, incomplete RBBB  LV Ejection Fraction: LV ejection fraction >= 40%  Imaging:No results found  Lab Review  Lab Results   Component Value Date    CHOL 160 01/14/2015    TRIG 92 10/05/2021    TRIG 113 04/22/2021    TRIG 100 06/01/2020    TRIG 136 05/29/2019    TRIG 131 01/14/2015    HDL 32 (L) 10/05/2021    HDL 33 (L) 04/22/2021    HDL 31 (L) 06/01/2020    HDL 38 (L) 05/29/2019    HDL 28 01/14/2015     No visits with results within 2 Month(s) from this visit  Latest known visit with results is:   Clinical Support on 01/03/2022   Component Date Value    SARS-CoV-2 01/03/2022 Positive*     Assessment/Plan     1  CAD, multiple vessel    2  Coronary artery disease involving native coronary artery of native heart with angina pectoris ([address] )    3  S/P CABG x 4    4  Hyperlipidemia, unspecified hyperlipidemia type      -    Edema resolved with discontinuation of amlodipine  He developed a cough from lisinopril but has been tolerating losartan  BP well controlled on home monitoring   - LDL at goal (<70 mg/dL) - 61 on recent testing  Will repeat later this year  - continue regular exercise and diet - exercises 6 times a week     - Continue atorvastatin

## 2022-06-16 DIAGNOSIS — I10 ESSENTIAL HYPERTENSION: ICD-10-CM

## 2022-06-16 RX ORDER — LOSARTAN POTASSIUM 100 MG/1
100 TABLET ORAL DAILY
Qty: 90 TABLET | Refills: 0 | Status: SHIPPED | OUTPATIENT
Start: 2022-06-16 | End: 2022-06-20

## 2022-07-20 ENCOUNTER — TELEPHONE (OUTPATIENT)
Dept: FAMILY MEDICINE CLINIC | Facility: CLINIC | Age: 82
End: 2022-07-20

## 2022-07-20 DIAGNOSIS — H91.90 HEARING LOSS, UNSPECIFIED HEARING LOSS TYPE, UNSPECIFIED LATERALITY: Primary | ICD-10-CM

## 2022-07-21 ENCOUNTER — TELEPHONE (OUTPATIENT)
Dept: CARDIOLOGY CLINIC | Facility: CLINIC | Age: 82
End: 2022-07-21

## 2022-07-21 NOTE — TELEPHONE ENCOUNTER
Pt called and lvm on our machine to report since being on losartan 100mg it has made him feel tired  He wanted to know your thoughts   States bp has been good

## 2022-07-22 NOTE — TELEPHONE ENCOUNTER
Patient will continue the losartan for now and follow up with pcp in august discuss other reasons for fatigue

## 2022-07-27 ENCOUNTER — RA CDI HCC (OUTPATIENT)
Dept: OTHER | Facility: HOSPITAL | Age: 82
End: 2022-07-27

## 2022-07-27 NOTE — PROGRESS NOTES
[facility] 75  coding opportunities       Chart reviewed, no opportunity found: CHART REVIEWED, NO OPPORTUNITY FOUND        Patients Insurance        Commercial Insurance: [employer]

## 2022-08-02 ENCOUNTER — OFFICE VISIT (OUTPATIENT)
Dept: AUDIOLOGY | Facility: CLINIC | Age: 82
End: 2022-08-02
Payer: COMMERCIAL

## 2022-08-02 DIAGNOSIS — H90.3 SENSORY HEARING LOSS, BILATERAL: Primary | ICD-10-CM

## 2022-08-02 PROCEDURE — 92557 COMPREHENSIVE HEARING TEST: CPT | Performed by: AUDIOLOGIST

## 2022-08-02 PROCEDURE — 92567 TYMPANOMETRY: CPT | Performed by: AUDIOLOGIST

## 2022-08-02 NOTE — PROGRESS NOTES
HEARING EVALUATION/HEARING AID VISIT    Name:  Sampson Holder  :  1940  Age:  [de-identified] y o  Date of Evaluation: 22     History:  Known bilateral hearing loss with use of AGUILAR HA's fit in 2017  Reason for visit: Sampson Holder is being seen today at the request of Dr Russell Shone for an evaluation of hearing  Patient reports decreased hearing from one or both of the devices  It has been 2 years since his last visit  Otoscopic Evaluation:   Right Ear: Clear and healthy ear canal and tympanic membrane   Left Ear: Clear and healthy ear canal and tympanic membrane    Tympanometry:   Right: Type Ad - hypermobile compliance   Left: Type Ad - hypermobile compliance    Audiogram Results:  Mild sloping to severe SHL, slightly greater in the low frequencies for the right ear (has been fluctuant in the past)  Word recognition scores are good for both ears  *see attached audiogram for details and configuration     Sampson Holder is fit with [employer] OPN 3 minirite T hearing aid(s)  Right serial number [id number]  Left serial number [id number]  Warranty date: OOW 2020 (Loss/Damage and repair)  Action:  1  R  found to be defective (static upon start up and distortion after) / changed, then were in good working order via listening check  2  Cleaned and checked all / cleaned molds and changed internal wax guards  Pro Wax's were just changed by the patient  RECOMMENDATIONS:  1  RTO 1/3/2023 for 6 month visit        Marina Tirado , JOSE-A, [place]# [id number], Hearing Aid Dispenser, [place]# [id number]  Clinical Audiologist

## 2022-08-04 ENCOUNTER — OFFICE VISIT (OUTPATIENT)
Dept: AUDIOLOGY | Facility: CLINIC | Age: 82
End: 2022-08-04
Payer: COMMERCIAL

## 2022-08-04 DIAGNOSIS — H90.3 SENSORY HEARING LOSS, BILATERAL: Primary | ICD-10-CM

## 2022-08-04 NOTE — PROGRESS NOTES
Progress Note    Name:  Malaika Menezes  :  1940  Age:  [de-identified] y o    Date of Evaluation: 22     Payment taken for R ; see note from 22    Marina Coleman , [place], [place]# [id number]  Clinical Audiologist
Gastric bypass status for obesity    H/O varicose vein ligation  left lower extremity 2011  History of hip replacement    S/P  section  X3; ,,   S/P endoscopy  Baseline

## 2022-08-05 ENCOUNTER — TELEPHONE (OUTPATIENT)
Dept: FAMILY MEDICINE CLINIC | Facility: CLINIC | Age: 82
End: 2022-08-05

## 2022-08-05 NOTE — TELEPHONE ENCOUNTER
Patient would like a call regarding supplements he was considering taking specifically COQ10 and organic beetroot powder which was recommend by the [employer] representative that visited him in his home

## 2022-08-06 NOTE — TELEPHONE ENCOUNTER
COq10 200mg daily would be great to start  Beet root for anti inflammatory purposes are ok, I just haven't heard to much research to support the data on it

## 2022-09-13 ENCOUNTER — TELEPHONE (OUTPATIENT)
Dept: FAMILY MEDICINE CLINIC | Facility: CLINIC | Age: 82
End: 2022-09-13

## 2022-09-13 DIAGNOSIS — I10 ESSENTIAL HYPERTENSION: Primary | ICD-10-CM

## 2022-09-13 DIAGNOSIS — Z12.5 SCREENING PSA (PROSTATE SPECIFIC ANTIGEN): ICD-10-CM

## 2022-09-13 DIAGNOSIS — E78.5 HYPERLIPIDEMIA, UNSPECIFIED HYPERLIPIDEMIA TYPE: ICD-10-CM

## 2022-09-25 DIAGNOSIS — E78.5 HYPERLIPIDEMIA, UNSPECIFIED HYPERLIPIDEMIA TYPE: ICD-10-CM

## 2022-09-26 RX ORDER — ATORVASTATIN CALCIUM 10 MG/1
TABLET, FILM COATED ORAL
Qty: 90 TABLET | Refills: 3 | Status: SHIPPED | OUTPATIENT
Start: 2022-09-26

## 2022-10-20 ENCOUNTER — OFFICE VISIT (OUTPATIENT)
Dept: FAMILY MEDICINE CLINIC | Facility: CLINIC | Age: 82
End: 2022-10-20
Payer: COMMERCIAL

## 2022-10-20 VITALS
HEART RATE: 75 BPM | HEIGHT: 66 IN | WEIGHT: 162 LBS | BODY MASS INDEX: 26.03 KG/M2 | OXYGEN SATURATION: 99 % | TEMPERATURE: 97.8 F | SYSTOLIC BLOOD PRESSURE: 160 MMHG | RESPIRATION RATE: 14 BRPM | DIASTOLIC BLOOD PRESSURE: 80 MMHG

## 2022-10-20 DIAGNOSIS — M77.11 LATERAL EPICONDYLITIS OF RIGHT ELBOW: Primary | ICD-10-CM

## 2022-10-20 DIAGNOSIS — Z23 ENCOUNTER FOR ADMINISTRATION OF VACCINE: ICD-10-CM

## 2022-10-20 PROCEDURE — G0008 ADMIN INFLUENZA VIRUS VAC: HCPCS | Performed by: FAMILY MEDICINE

## 2022-10-20 PROCEDURE — 90662 IIV NO PRSV INCREASED AG IM: CPT | Performed by: FAMILY MEDICINE

## 2022-10-20 PROCEDURE — 99214 OFFICE O/P EST MOD 30 MIN: CPT | Performed by: FAMILY MEDICINE

## 2022-10-20 NOTE — PROGRESS NOTES
Carole Leach 1940 male MRN: [id number]    FAMILY PRACTICE OFFICE VISIT  [facility] - [address]      ASSESSMENT/PLAN  Carole Leach is a [de-identified] y o  male presents to the office for    Diagnoses and all orders for this visit:    Lateral epicondylitis of right elbow  -     Ambulatory Referral to Physical Therapy; Future    Encounter for administration of vaccine  -     influenza vaccine, high-dose, PF 0 7 mL (FLUZONE HIGH-DOSE)     Lateral epi of the right elbow exercises given to the patient  Encouraged by a brace for his elbow  Patient is to perform home exercises  If there is no improvement recommend reaching out to physical therapy for evaluation  As well as possible Orthopedic  This is likely secondary to his repetitive movements at home taking care of his wife  BMI Counseling: Body mass index is 26 15 kg/m²  The BMI is above normal  Nutrition recommendations include consuming healthier snacks  Exercise recommendations include exercising 3-5 times per week  Rationale for BMI follow-up plan is due to patient being overweight or obese  Depression Screening and Follow-up Plan: Patient's depression screening was positive with a PHQ-2 score of 3  Their PHQ-9 score was 6  Patient assessed for underlying major depression  Brief counseling provided and recommend additional follow-up/re-evaluation next office visit  Future Appointments   Date Time Provider Armen Polanco   12/1/2022  9:00 AM Luis Hudson MD [facility]   12/5/2022 10:40 AM Brigida Clarke MD [facility]   12/8/2022  1:00 PM DO ROSITA Jain [place] [facility]   1/3/2023 10:00 AM Angeles MARTINEZ OP Aud[facility] [address]          SUBJECTIVE  CC: Elbow Pain (Right elbow  When bent elbow felt a pull sensation)      HPI:  Carole Leach is a [de-identified] y o  male who presents for an acute appointment    Patient states that he was sitting on his couch and felt like a pulling sensation of his right elbow  He states that it only happened when it was bent  It happened twice in the last 5 months  Patient states that he does lift his wife every morning with that elbow  But has not had any trauma or falls  States that this is been happening only this last couple months  Would like the flu shot today if possible        Review of Systems   Constitutional: Negative for activity change, appetite change, chills, fatigue and fever  HENT: Negative for congestion  Respiratory: Negative for cough, chest tightness and shortness of breath  Cardiovascular: Negative for chest pain and leg swelling  Gastrointestinal: Negative for abdominal distention, abdominal pain, constipation, diarrhea, nausea and vomiting  Musculoskeletal: Positive for arthralgias (Right elbow)  All other systems reviewed and are negative        Historical Information   The patient history was reviewed as follows:  Past Medical History:   Diagnosis Date   • Arthritis    • Benign essential hypertension    • Coronary artery disease    • Hearing problem    • Hx of CABG    • Hypercholesteremia    • Wears glasses     as needed   • Wears hearing aid     bilateral aids         Medications:     Current Outpatient Medications:   •  aspirin 81 MG tablet, Take 1 tablet by mouth daily at bedtime , Disp: , Rfl:   •  atorvastatin (LIPITOR) 10 mg tablet, TAKE 1 TABLET BY MOUTH  DAILY, Disp: 90 tablet, Rfl: 3  •  Coenzyme Q10 (CoQ10) 100 MG CAPS, Take by mouth, Disp: , Rfl:   •  ibuprofen (MOTRIN) 200 mg tablet, Take 1 tablet by mouth 3 (three) times a day as needed, Disp: , Rfl:   •  losartan (COZAAR) 100 MG tablet, TAKE 1 TABLET BY MOUTH  DAILY, Disp: 90 tablet, Rfl: 2  •  VITAMIN D PO, Take 2,000 Units by mouth every morning , Disp: , Rfl:   •  Multiple Vitamins-Minerals (MULTI-VITAMIN/MINERALS PO), Take 1 tablet by mouth every morning Powder-[employer]- one scoop in H2O (Patient not taking: Reported on 10/20/2022), Disp: , Rfl:   • Zinc 100 MG TABS, Take by mouth (Patient not taking: Reported on 10/20/2022), Disp: , Rfl:     Allergies   Allergen Reactions   • Acetaminophen Throat Swelling   • Amoxicillin Rash   • Lisinopril Cough       OBJECTIVE  Vitals:   Vitals:    10/20/22 0843   BP: 160/80   Pulse: 75   Resp: 14   Temp: 97 8 °F (36 6 °C)   SpO2: 99%   Weight: 73 5 kg (162 lb)   Height: 5' 6" (1 676 m)         Physical Exam  Vitals reviewed  Constitutional:       Appearance: He is well-developed  HENT:      Head: Normocephalic and atraumatic  Eyes:      Conjunctiva/sclera: Conjunctivae normal       Pupils: Pupils are equal, round, and reactive to light  Cardiovascular:      Rate and Rhythm: Normal rate and regular rhythm  Heart sounds: Normal heart sounds, S1 normal and S2 normal  No murmur heard  Pulmonary:      Effort: Pulmonary effort is normal  No respiratory distress  Breath sounds: Normal breath sounds  No wheezing  Musculoskeletal:         General: Tenderness (Right elbow; tenderness over the lateral aspect) present  Normal range of motion  Cervical back: Normal range of motion and neck supple  Skin:     General: Skin is warm  Neurological:      Mental Status: He is alert and oriented to person, place, and time  Psychiatric:         Speech: Speech normal          Behavior: Behavior normal          Thought Content:  Thought content normal          Judgment: Judgment normal                     Tita Escalante   [facility]  10/20/2022

## 2022-11-28 ENCOUNTER — APPOINTMENT (OUTPATIENT)
Dept: LAB | Facility: CLINIC | Age: 82
End: 2022-11-28

## 2022-11-28 DIAGNOSIS — I10 ESSENTIAL HYPERTENSION: ICD-10-CM

## 2022-11-28 DIAGNOSIS — I25.119 CORONARY ARTERY DISEASE INVOLVING NATIVE CORONARY ARTERY OF NATIVE HEART WITH ANGINA PECTORIS (HCC): ICD-10-CM

## 2022-11-28 DIAGNOSIS — E83.118 OTHER HEMOCHROMATOSIS: ICD-10-CM

## 2022-11-28 DIAGNOSIS — E78.5 HYPERLIPIDEMIA, UNSPECIFIED HYPERLIPIDEMIA TYPE: ICD-10-CM

## 2022-11-28 DIAGNOSIS — Z12.5 SCREENING PSA (PROSTATE SPECIFIC ANTIGEN): ICD-10-CM

## 2022-11-28 LAB
ALBUMIN SERPL BCP-MCNC: 3.8 G/DL (ref 3.5–5)
ALP SERPL-CCNC: 57 U/L (ref 46–116)
ALT SERPL W P-5'-P-CCNC: 18 U/L (ref 12–78)
ANION GAP SERPL CALCULATED.3IONS-SCNC: 6 MMOL/L (ref 4–13)
AST SERPL W P-5'-P-CCNC: 16 U/L (ref 5–45)
BASOPHILS # BLD AUTO: 0.1 THOUSANDS/ÂΜL (ref 0–0.1)
BASOPHILS NFR BLD AUTO: 2 % (ref 0–1)
BILIRUB SERPL-MCNC: 0.83 MG/DL (ref 0.2–1)
BUN SERPL-MCNC: 28 MG/DL (ref 5–25)
CALCIUM SERPL-MCNC: 9.3 MG/DL (ref 8.3–10.1)
CHLORIDE SERPL-SCNC: 111 MMOL/L (ref 96–108)
CHOLEST SERPL-MCNC: 118 MG/DL
CO2 SERPL-SCNC: 25 MMOL/L (ref 21–32)
CREAT SERPL-MCNC: 1.34 MG/DL (ref 0.6–1.3)
EOSINOPHIL # BLD AUTO: 0.2 THOUSAND/ÂΜL (ref 0–0.61)
EOSINOPHIL NFR BLD AUTO: 3 % (ref 0–6)
ERYTHROCYTE [DISTWIDTH] IN BLOOD BY AUTOMATED COUNT: 12.2 % (ref 11.6–15.1)
FERRITIN SERPL-MCNC: 135 NG/ML (ref 8–388)
GFR SERPL CREATININE-BSD FRML MDRD: 49 ML/MIN/1.73SQ M
GLUCOSE P FAST SERPL-MCNC: 120 MG/DL (ref 65–99)
HCT VFR BLD AUTO: 45.2 % (ref 36.5–49.3)
HDLC SERPL-MCNC: 34 MG/DL
HGB BLD-MCNC: 14.6 G/DL (ref 12–17)
IMM GRANULOCYTES # BLD AUTO: 0.01 THOUSAND/UL (ref 0–0.2)
IMM GRANULOCYTES NFR BLD AUTO: 0 % (ref 0–2)
IRON SATN MFR SERPL: 43 % (ref 20–50)
IRON SERPL-MCNC: 120 UG/DL (ref 65–175)
LDLC SERPL CALC-MCNC: 59 MG/DL (ref 0–100)
LYMPHOCYTES # BLD AUTO: 1.49 THOUSANDS/ÂΜL (ref 0.6–4.47)
LYMPHOCYTES NFR BLD AUTO: 24 % (ref 14–44)
MCH RBC QN AUTO: 29 PG (ref 26.8–34.3)
MCHC RBC AUTO-ENTMCNC: 32.3 G/DL (ref 31.4–37.4)
MCV RBC AUTO: 90 FL (ref 82–98)
MONOCYTES # BLD AUTO: 0.57 THOUSAND/ÂΜL (ref 0.17–1.22)
MONOCYTES NFR BLD AUTO: 9 % (ref 4–12)
NEUTROPHILS # BLD AUTO: 3.73 THOUSANDS/ÂΜL (ref 1.85–7.62)
NEUTS SEG NFR BLD AUTO: 62 % (ref 43–75)
NONHDLC SERPL-MCNC: 84 MG/DL
NRBC BLD AUTO-RTO: 0 /100 WBCS
PLATELET # BLD AUTO: 284 THOUSANDS/UL (ref 149–390)
PMV BLD AUTO: 11 FL (ref 8.9–12.7)
POTASSIUM SERPL-SCNC: 4.1 MMOL/L (ref 3.5–5.3)
PROT SERPL-MCNC: 7.5 G/DL (ref 6.4–8.4)
PSA SERPL-MCNC: 1 NG/ML (ref 0–4)
RBC # BLD AUTO: 5.04 MILLION/UL (ref 3.88–5.62)
SODIUM SERPL-SCNC: 142 MMOL/L (ref 135–147)
TIBC SERPL-MCNC: 282 UG/DL (ref 250–450)
TRIGL SERPL-MCNC: 125 MG/DL
WBC # BLD AUTO: 6.1 THOUSAND/UL (ref 4.31–10.16)

## 2022-11-29 ENCOUNTER — RA CDI HCC (OUTPATIENT)
Dept: OTHER | Facility: HOSPITAL | Age: 82
End: 2022-11-29

## 2022-11-29 ENCOUNTER — TELEPHONE (OUTPATIENT)
Dept: CARDIOLOGY CLINIC | Facility: CLINIC | Age: 82
End: 2022-11-29

## 2022-11-29 NOTE — TELEPHONE ENCOUNTER
----- Message from Reema Sifuentes DO sent at 11/28/2022  4:22 PM EST -----  Can you please let the patient know blood work looked good    Cholesterol levels are at goal

## 2022-11-29 NOTE — PROGRESS NOTES
[facility]  coding opportunities       Chart reviewed, no opportunity found:   [address]        Patients Insurance     Medicare Insurance: [employer]age

## 2022-12-01 ENCOUNTER — OFFICE VISIT (OUTPATIENT)
Dept: FAMILY MEDICINE CLINIC | Facility: CLINIC | Age: 82
End: 2022-12-01

## 2022-12-01 ENCOUNTER — LAB (OUTPATIENT)
Dept: LAB | Facility: CLINIC | Age: 82
End: 2022-12-01

## 2022-12-01 VITALS
BODY MASS INDEX: 25.55 KG/M2 | TEMPERATURE: 97.7 F | WEIGHT: 159 LBS | DIASTOLIC BLOOD PRESSURE: 70 MMHG | HEIGHT: 66 IN | SYSTOLIC BLOOD PRESSURE: 110 MMHG | RESPIRATION RATE: 14 BRPM | HEART RATE: 84 BPM

## 2022-12-01 DIAGNOSIS — M85.80 OSTEOPENIA, UNSPECIFIED LOCATION: ICD-10-CM

## 2022-12-01 DIAGNOSIS — I10 ESSENTIAL HYPERTENSION: ICD-10-CM

## 2022-12-01 DIAGNOSIS — I25.119 CORONARY ARTERY DISEASE INVOLVING NATIVE CORONARY ARTERY OF NATIVE HEART WITH ANGINA PECTORIS (HCC): ICD-10-CM

## 2022-12-01 DIAGNOSIS — N18.31 STAGE 3A CHRONIC KIDNEY DISEASE (HCC): ICD-10-CM

## 2022-12-01 DIAGNOSIS — E78.2 MIXED HYPERLIPIDEMIA: ICD-10-CM

## 2022-12-01 DIAGNOSIS — Z00.00 MEDICARE ANNUAL WELLNESS VISIT, SUBSEQUENT: Primary | ICD-10-CM

## 2022-12-01 LAB
ANION GAP SERPL CALCULATED.3IONS-SCNC: 6 MMOL/L (ref 4–13)
BUN SERPL-MCNC: 28 MG/DL (ref 5–25)
CALCIUM SERPL-MCNC: 9.3 MG/DL (ref 8.3–10.1)
CHLORIDE SERPL-SCNC: 110 MMOL/L (ref 96–108)
CO2 SERPL-SCNC: 25 MMOL/L (ref 21–32)
CREAT SERPL-MCNC: 1.26 MG/DL (ref 0.6–1.3)
GFR SERPL CREATININE-BSD FRML MDRD: 53 ML/MIN/1.73SQ M
GLUCOSE P FAST SERPL-MCNC: 112 MG/DL (ref 65–99)
POTASSIUM SERPL-SCNC: 4.5 MMOL/L (ref 3.5–5.3)
SODIUM SERPL-SCNC: 141 MMOL/L (ref 135–147)

## 2022-12-01 RX ORDER — LOSARTAN POTASSIUM 50 MG/1
50 TABLET ORAL DAILY
Start: 2022-12-01

## 2022-12-01 RX ORDER — LOSARTAN POTASSIUM 50 MG/1
100 TABLET ORAL DAILY
Start: 2022-12-01 | End: 2022-12-01

## 2022-12-01 NOTE — PATIENT INSTRUCTIONS
Medicare Preventive Visit Patient Instructions  Thank you for completing your Welcome to Medicare Visit or Medicare Annual Wellness Visit today  Your next wellness visit will be due in one year (12/2/2023)  The screening/preventive services that you may require over the next 5-10 years are detailed below  Some tests may not apply to you based off risk factors and/or age  Screening tests ordered at today's visit but not completed yet may show as past due  Also, please note that scanned in results may not display below  Preventive Screenings:  Service Recommendations Previous Testing/Comments   Colorectal Cancer Screening  · Colonoscopy    · Fecal Occult Blood Test (FOBT)/Fecal Immunochemical Test (FIT)  · Fecal DNA/Cologuard Test  · Flexible Sigmoidoscopy Age: 39-70 years old   Colonoscopy: every 10 years (May be performed more frequently if at higher risk)  OR  FOBT/FIT: every 1 year  OR  Cologuard: every 3 years  OR  Sigmoidoscopy: every 5 years  Screening may be recommended earlier than age 39 if at higher risk for colorectal cancer  Also, an individualized decision between you and your healthcare provider will decide whether screening between the ages of 74-80 would be appropriate   Colonoscopy: 01/13/2021  FOBT/FIT: Not on file  Cologuard: Not on file  Sigmoidoscopy: Not on file    Screening Current     Prostate Cancer Screening Individualized decision between patient and health care provider in men between ages of 53-78   Medicare will cover every 12 months beginning on the day after your 50th birthday PSA: 1 0 ng/mL     Screening Not Indicated     Hepatitis C Screening Once for adults born between 1945 and 1965  More frequently in patients at high risk for Hepatitis C Hep C Antibody: Not on file        Diabetes Screening 1-2 times per year if you're at risk for diabetes or have pre-diabetes Fasting glucose: 120 mg/dL (11/28/2022)  A1C: 5 7 % (12/4/2019)  Screening Current   Cholesterol Screening Once every 5 years if you don't have a lipid disorder  May order more often based on risk factors  Lipid panel: 11/28/2022  Screening Not Indicated  History Lipid Disorder      Other Preventive Screenings Covered by Medicare:  1  Abdominal Aortic Aneurysm (AAA) Screening: covered once if your at risk  You're considered to be at risk if you have a family history of AAA or a male between the age of 73-68 who smoking at least 100 cigarettes in your lifetime  2  Lung Cancer Screening: covers low dose CT scan once per year if you meet all of the following conditions: (1) Age 50-69; (2) No signs or symptoms of lung cancer; (3) Current smoker or have quit smoking within the last 15 years; (4) You have a tobacco smoking history of at least 20 pack years (packs per day x number of years you smoked); (5) You get a written order from a healthcare provider  3  Glaucoma Screening: covered annually if you're considered high risk: (1) You have diabetes OR (2) Family history of glaucoma OR (3)  aged 48 and older OR (3)  American aged 72 and older  3  Osteoporosis Screening: covered every 2 years if you meet one of the following conditions: (1) Have a vertebral abnormality; (2) On glucocorticoid therapy for more than 3 months; (3) Have primary hyperparathyroidism; (4) On osteoporosis medications and need to assess response to drug therapy  5  HIV Screening: covered annually if you're between the age of 12-76  Also covered annually if you are younger than 13 and older than 72 with risk factors for HIV infection  For pregnant patients, it is covered up to 3 times per pregnancy      Immunizations:  Immunization Recommendations   Influenza Vaccine Annual influenza vaccination during flu season is recommended for all persons aged >= 6 months who do not have contraindications   Pneumococcal Vaccine   * Pneumococcal conjugate vaccine = PCV13 (Prevnar 13), PCV15 (Vaxneuvance), PCV20 (Prevnar 20)  * Pneumococcal polysaccharide vaccine = PPSV23 (Pneumovax) Adults 2364 years old: 1-3 doses may be recommended based on certain risk factors  Adults 72 years old: 1-2 doses may be recommended based off what pneumonia vaccine you previously received   Hepatitis B Vaccine 3 dose series if at intermediate or high risk (ex: diabetes, end stage renal disease, liver disease)   Tetanus (Td) Vaccine - COST NOT COVERED BY MEDICARE PART B Following completion of primary series, a booster dose should be given every 10 years to maintain immunity against tetanus  Td may also be given as tetanus wound prophylaxis  Tdap Vaccine - COST NOT COVERED BY MEDICARE PART B Recommended at least once for all adults  For pregnant patients, recommended with each pregnancy  Shingles Vaccine (Shingrix) - COST NOT COVERED BY MEDICARE PART B  2 shot series recommended in those aged 48 and above     Health Maintenance Due:      Topic Date Due   • Colorectal Cancer Screening  01/13/2026     Immunizations Due:      Topic Date Due   • Hepatitis B Vaccine (1 of 3 - 3-dose series) Never done   • COVID-19 Vaccine (3 - Booster for [place] series) 08/30/2021     Advance Directives   What are advance directives? Advance directives are legal documents that state your wishes and plans for medical care  These plans are made ahead of time in case you lose your ability to make decisions for yourself  Advance directives can apply to any medical decision, such as the treatments you want, and if you want to donate organs  What are the types of advance directives? There are many types of advance directives, and each state has rules about how to use them  You may choose a combination of any of the following:  · Living will: This is a written record of the treatment you want  You can also choose which treatments you do not want, which to limit, and which to stop at a certain time  This includes surgery, medicine, IV fluid, and tube feedings     · Durable power of  for healthcare [facility]): This is a written record that states who you want to make healthcare choices for you when you are unable to make them for yourself  This person, called a proxy, is usually a family member or a friend  You may choose more than 1 proxy  · Do not resuscitate (DNR) order:  A DNR order is used in case your heart stops beating or you stop breathing  It is a request not to have certain forms of treatment, such as CPR  A DNR order may be included in other types of advance directives  · Medical directive: This covers the care that you want if you are in a coma, near death, or unable to make decisions for yourself  You can list the treatments you want for each condition  Treatment may include pain medicine, surgery, blood transfusions, dialysis, IV or tube feedings, and a ventilator (breathing machine)  · Values history: This document has questions about your views, beliefs, and how you feel and think about life  This information can help others choose the care that you would choose  Why are advance directives important? An advance directive helps you control your care  Although spoken wishes may be used, it is better to have your wishes written down  Spoken wishes can be misunderstood, or not followed  Treatments may be given even if you do not want them  An advance directive may make it easier for your family to make difficult choices about your care  Weight Management   Why it is important to manage your weight:  Being overweight increases your risk of health conditions such as heart disease, high blood pressure, type 2 diabetes, and certain types of cancer  It can also increase your risk for osteoarthritis, sleep apnea, and other respiratory problems  Aim for a slow, steady weight loss  Even a small amount of weight loss can lower your risk of health problems  How to lose weight safely:  A safe and healthy way to lose weight is to eat fewer calories and get regular exercise   You can lose up about 1 pound a week by decreasing the number of calories you eat by 500 calories each day  Healthy meal plan for weight management:  A healthy meal plan includes a variety of foods, contains fewer calories, and helps you stay healthy  A healthy meal plan includes the following:  · Eat whole-grain foods more often  A healthy meal plan should contain fiber  Fiber is the part of grains, fruits, and vegetables that is not broken down by your body  Whole-grain foods are healthy and provide extra fiber in your diet  Some examples of whole-grain foods are whole-wheat breads and pastas, oatmeal, brown rice, and bulgur  · Eat a variety of vegetables every day  Include dark, leafy greens such as spinach, kale, carmencita greens, and mustard greens  Eat yellow and orange vegetables such as carrots, sweet potatoes, and winter squash  · Eat a variety of fruits every day  Choose fresh or canned fruit (canned in its own juice or light syrup) instead of juice  Fruit juice has very little or no fiber  · Eat low-fat dairy foods  Drink fat-free (skim) milk or 1% milk  Eat fat-free yogurt and low-fat cottage cheese  Try low-fat cheeses such as mozzarella and other reduced-fat cheeses  · Choose meat and other protein foods that are low in fat  Choose beans or other legumes such as split peas or lentils  Choose fish, skinless poultry (chicken or turkey), or lean cuts of red meat (beef or pork)  Before you cook meat or poultry, cut off any visible fat  · Use less fat and oil  Try baking foods instead of frying them  Add less fat, such as margarine, sour cream, regular salad dressing and mayonnaise to foods  Eat fewer high-fat foods  Some examples of high-fat foods include french fries, doughnuts, ice cream, and cakes  · Eat fewer sweets  Limit foods and drinks that are high in sugar  This includes candy, cookies, regular soda, and sweetened drinks  Exercise:  Exercise at least 30 minutes per day on most days of the week   Some examples of exercise include walking, biking, dancing, and swimming  You can also fit in more physical activity by taking the stairs instead of the elevator or parking farther away from stores  Ask your healthcare provider about the best exercise plan for you  © Copyright [employer] 2018 Information is for End User's use only and may not be sold, redistributed or otherwise used for commercial purposes   All illustrations and images included in CareNotes® are the copyrighted property of A D A M , Inc  or [address] [place] [address]

## 2022-12-01 NOTE — PROGRESS NOTES
Assessment and Plan:     Problem List Items Addressed This Visit        Cardiovascular and Mediastinum    Coronary artery disease involving native coronary artery of native heart with angina pectoris ([facility] [address] )       Other    Hyperlipidemia   Other Visit Diagnoses     Medicare annual wellness visit, subsequent    -  Primary    Stage 3a chronic kidney disease ([facility] [address] )        Relevant Orders    Basic metabolic panel (Completed)    Osteopenia, unspecified location        Relevant Orders    DXA bone density spine hip and pelvis    Essential hypertension        Relevant Medications    losartan (COZAAR) 50 mg tablet      Reduced losartan to 50 mg  Monitor BP at home  If the patient starts having elevated BP is please let us know  Assuming lightheadedness was likely secondary to low blood pressures  Repeat bone density  Coronary artery disease history of being managed by his cardiologist   Hyperlipidemia continue Lipitor 10 mg  Reviewed CKD     Preventive health issues were discussed with patient, and age appropriate screening tests were ordered as noted in patient's After Visit Summary  Personalized health advice and appropriate referrals for health education or preventive services given if needed, as noted in patient's After Visit Summary  History of Present Illness:     Patient presents for a Medicare Wellness Visit  Patient continues to be caring for his wife  Currently asymptomatic of any chest pain or shortness of breath  But has been having some lightheadedness at times  Has been taking his medications for his blood pressure and cholesterol without any difficulties    Does have a history of bone density being slightly positive for osteopenia    HPI   Patient Care Team:  Javad Wood MD as PCP - General (Family Medicine)  MD Mimi Alvarado MD Priscille Setter, MD Norleen Angus,      Review of Systems:     Review of Systems   Constitutional: Negative for activity change, appetite change, chills, fatigue and fever  HENT: Negative for congestion  Respiratory: Negative for cough, chest tightness and shortness of breath  Cardiovascular: Negative for chest pain and leg swelling  Gastrointestinal: Negative for abdominal distention, abdominal pain, constipation, diarrhea, nausea and vomiting  Neurological: Positive for light-headedness  All other systems reviewed and are negative  Problem List:     Patient Active Problem List   Diagnosis   • CAD, multiple vessel   • S/P CABG x 4   • Hyperlipidemia   • Methylenetetrahydrofolate reductase deficiency (HCC)   • Elevated LFTs   • Other hemochromatosis   • Coronary artery disease involving native coronary artery of native heart with angina pectoris [facility])      Past Medical and Surgical History:     Past Medical History:   Diagnosis Date   • Arthritis    • Benign essential hypertension    • Coronary artery disease    • Hearing problem    • Hx of CABG    • Hypercholesteremia    • Wears glasses     as needed   • Wears hearing aid     bilateral aids     Past Surgical History:   Procedure Laterality Date   • COLONOSCOPY  01/13/2021   • CORONARY ARTERY BYPASS GRAFT      triple   • EGD      stomach polyp   • HERNIA REPAIR Bilateral     inguinal   • KNEE SURGERY Right     ligament   • LASIK  2000   • OH XCAPSL CTRC RMVL INSJ IO LENS PROSTH W/O ECP Left 2/8/2021    Procedure: EXTRACTION EXTRACAPSULAR CATARACT PHACO INTRAOCULAR LENS (IOL); Surgeon: Ivis Mohan MD;  Location: [facility] MAIN OR;  Service: Ophthalmology   • OH XCAPSL CTRC RMVL INSJ IO LENS PROSTH W/O ECP Right 3/15/2021    Procedure: EXTRACTION EXTRACAPSULAR CATARACT PHACO INTRAOCULAR LENS (IOL);   Surgeon: Ivis Mohan MD;  Location: [facility] MAIN OR;  Service: Ophthalmology      Family History:     Family History   Problem Relation Age of Onset   • Hypertension Mother    • Coronary artery disease Father    • Stroke Father    • Aneurysm Brother         abdominal aortic aneurysm Social History:     Social History     Socioeconomic History   • Marital status: /Civil Union     Spouse name: None   • Number of children: None   • Years of education: None   • Highest education level: None   Occupational History   • None   Tobacco Use   • Smoking status: Former     Types: Cigarettes     Quit date: 1970     Years since quittin 9   • Smokeless tobacco: Never   Vaping Use   • Vaping Use: Never used   Substance and Sexual Activity   • Alcohol use: Not Currently     Comment: quit    • Drug use: Never   • Sexual activity: None   Other Topics Concern   • None   Social History Narrative   • None     Social Determinants of Health     Financial Resource Strain: Low Risk    • Difficulty of Paying Living Expenses: Not hard at all   Food Insecurity: Not on file   Transportation Needs: No Transportation Needs   • Lack of Transportation (Medical): No   • Lack of Transportation (Non-Medical): No   Physical Activity: Not on file   Stress: Not on file   Social Connections: Not on file   Intimate Partner Violence: Not on file   Housing Stability: Not on file      Medications and Allergies:     Current Outpatient Medications   Medication Sig Dispense Refill   • aspirin 81 MG tablet Take 1 tablet by mouth daily at bedtime      • atorvastatin (LIPITOR) 10 mg tablet TAKE 1 TABLET BY MOUTH  DAILY 90 tablet 3   • Coenzyme Q10 (CoQ10) 100 MG CAPS Take by mouth     • ibuprofen (MOTRIN) 200 mg tablet Take 1 tablet by mouth 3 (three) times a day as needed     • losartan (COZAAR) 50 mg tablet Take 1 tablet (50 mg total) by mouth daily     • VITAMIN D PO Take 2,000 Units by mouth every morning        No current facility-administered medications for this visit       Allergies   Allergen Reactions   • Acetaminophen Throat Swelling   • Amoxicillin Rash   • Lisinopril Cough      Immunizations:     Immunization History   Administered Date(s) Administered   • COVID-19 MODERNA VACC 0 5 ML IM 2021, 2021   • Influenza Split High Dose Preservative Free IM 10/02/2012, 10/25/2013, 10/15/2014, 10/23/2015, 10/26/2016, 09/29/2017   • Influenza, high dose seasonal 0 7 mL 10/29/2018, 11/04/2019, 11/20/2020, 10/15/2021, 10/20/2022   • Influenza, seasonal, injectable 11/11/2005, 11/21/2006, 11/13/2007, 11/24/2008, 12/04/2009, 12/01/2010, 09/19/2011   • Pneumococcal Conjugate 13-Valent 10/26/2016   • Pneumococcal Polysaccharide PPV23 11/21/2006, 11/18/2019   • Td (adult), adsorbed 10/24/2000   • Zoster 10/28/2015      Health Maintenance:         Topic Date Due   • Colorectal Cancer Screening  01/13/2026         Topic Date Due   • Hepatitis B Vaccine (1 of 3 - 3-dose series) Never done   • COVID-19 Vaccine (3 - Booster for [place] Cm series) 08/30/2021      Medicare Screening Tests and Risk Assessments:     Leonides Duckworth is here for his Subsequent Wellness visit  Health Risk Assessment:   Patient rates overall health as very good  Patient feels that their physical health rating is same  Patient is very satisfied with their life  Eyesight was rated as same  Hearing was rated as same  Patient feels that their emotional and mental health rating is same  Patients states they are never, rarely angry  Patient states they are often unusually tired/fatigued  Pain experienced in the last 7 days has been some  Patient's pain rating has been 1/10  Patient states that he has experienced no weight loss or gain in last 6 months  Fall Risk Screening: In the past year, patient has experienced: no history of falling in past year      Home Safety:  Patient does not have trouble with stairs inside or outside of their home  Patient has working smoke alarms and has working carbon monoxide detector  Home safety hazards include: none  Nutrition:   Current diet is Regular  Medications:   Patient is currently taking over-the-counter supplements  OTC medications include: qunol CoQ10, D3  Patient is able to manage medications       Activities of Daily Living (ADLs)/Instrumental Activities of Daily Living (IADLs):   Walk and transfer into and out of bed and chair?: Yes  Dress and groom yourself?: Yes    Bathe or shower yourself?: Yes    Feed yourself? Yes  Do your laundry/housekeeping?: Yes  Manage your money, pay your bills and track your expenses?: Yes  Make your own meals?: Yes    Do your own shopping?: Yes    Durable Medical Equipment Suppliers  none    Previous Hospitalizations:   Any hospitalizations or ED visits within the last 12 months?: No      Advance Care Planning:   Living will: Yes    Durable POA for healthcare: Yes    Advanced directive: Yes      Comments: Medical [place]    Cognitive Screening:   Provider or family/friend/caregiver concerned regarding cognition?: No    PREVENTIVE SCREENINGS      Cardiovascular Screening:    General: Screening Not Indicated, History Lipid Disorder and Screening Current      Diabetes Screening:     General: Screening Current      Colorectal Cancer Screening:     General: Screening Current      Prostate Cancer Screening:    General: Screening Not Indicated      Osteoporosis Screening:      Due for: Bone Density Ultrasound      Abdominal Aortic Aneurysm (AAA) Screening:    Risk factors include: tobacco use        General: Screening Current      Lung Cancer Screening:     General: Screening Not Indicated      Hepatitis C Screening:    General: Screening Current    Screening, Brief Intervention, and Referral to Treatment (SBIRT)    Screening  Typical number of drinks in a day: 0  Typical number of drinks in a week: 0  Interpretation: Low risk drinking behavior      AUDIT-C Screenin) How often did you have a drink containing alcohol in the past year? never  2) How many drinks did you have on a typical day when you were drinking in the past year? 0  3) How often did you have 6 or more drinks on one occasion in the past year? never    AUDIT-C Score: 0  Interpretation: Score 0-3 (male): Negative screen for alcohol misuse    Single Item Drug Screening:  How often have you used an illegal drug (including marijuana) or a prescription medication for non-medical reasons in the past year? never    Single Item Drug Screen Score: 0  Interpretation: Negative screen for possible drug use disorder    No results found  Physical Exam:     /70 (BP Location: Left arm, Patient Position: Sitting, Cuff Size: Standard)   Pulse 84   Temp 97 7 °F (36 5 °C) (Temporal)   Resp 14   Ht 5' 6" (1 676 m)   Wt 72 1 kg (159 lb)   BMI 25 66 kg/m²     Physical Exam  Vitals reviewed  Constitutional:       Appearance: He is well-developed and well-nourished  HENT:      Head: Normocephalic and atraumatic  Right Ear: External ear normal       Left Ear: External ear normal       Nose: Nose normal       Mouth/Throat:      Mouth: Oropharynx is clear and moist    Eyes:      Extraocular Movements: EOM normal       Conjunctiva/sclera: Conjunctivae normal       Pupils: Pupils are equal, round, and reactive to light  Cardiovascular:      Rate and Rhythm: Normal rate and regular rhythm  Pulses: Intact distal pulses  Heart sounds: Normal heart sounds  Pulmonary:      Effort: Pulmonary effort is normal       Breath sounds: Normal breath sounds  No wheezing  Abdominal:      General: Bowel sounds are normal  There is no distension  Palpations: Abdomen is soft  There is no mass  Tenderness: There is no abdominal tenderness  Genitourinary:     Penis: Normal     Musculoskeletal:         General: No tenderness or edema  Normal range of motion  Cervical back: Normal range of motion and neck supple  Skin:     General: Skin is warm  Capillary Refill: Capillary refill takes less than 2 seconds  Neurological:      Mental Status: He is alert and oriented to person, place, and time  Cranial Nerves: No cranial nerve deficit  Sensory: No sensory deficit  Motor: No abnormal muscle tone  Coordination: Coordination normal       Deep Tendon Reflexes: Reflexes normal    Psychiatric:         Mood and Affect: Mood and affect normal          Behavior: Behavior normal          Thought Content:  Thought content normal          Judgment: Judgment normal           Pat Enriquez MD

## 2022-12-05 ENCOUNTER — OFFICE VISIT (OUTPATIENT)
Dept: HEMATOLOGY ONCOLOGY | Facility: MEDICAL CENTER | Age: 82
End: 2022-12-05

## 2022-12-05 VITALS
RESPIRATION RATE: 18 BRPM | HEIGHT: 66 IN | OXYGEN SATURATION: 97 % | WEIGHT: 161 LBS | SYSTOLIC BLOOD PRESSURE: 107 MMHG | DIASTOLIC BLOOD PRESSURE: 60 MMHG | BODY MASS INDEX: 25.88 KG/M2 | HEART RATE: 74 BPM

## 2022-12-05 DIAGNOSIS — E83.118 OTHER HEMOCHROMATOSIS: Primary | ICD-10-CM

## 2022-12-05 NOTE — PROGRESS NOTES
Isabel Ormond  1940  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 and a history of elevated LFTs  Previously patient had undergone phlebotomies with normalization of the liver function tests  Last phlebotomy was approximately 6 years ago  From a hematology standpoint, patient continues to feel well and clinically there are no concerning signs  The ferritin level is trended below, about the same as before  Other CBC parameters and LFTs are okay/acceptable  We rediscussed options  As above, Mr Reilly Davila feels well  Patient states that it is more difficult to get a phlebotomy  Patient will try to have 1 unit removed between now and next year (but patient understands that it is not absolutely crucial that he undergo this phlebotomy)  Mr Reilly Davila needs to return in 1 year with repeat blood work before  Routine health maintenance and medical care is up-to-date  Patient knows to call the hematology/oncology office if there are any other questions or concerns  Carefully review your medication list and verify that the list is accurate and up-to-date  Please call the hematology/oncology office if there are medications missing from the list, medications on the list that you are not currently taking or if there is a dosage or instruction that is different from how you're taking that medication      Patient goals and areas of care:   Yearly follow-ups, monitor CBC and LFTs, consider phlebotomy  Barriers to care:   None  Patient is able to self-care   ____________________________________________________________________________________________________    Chief Complaint   Patient presents with   • Follow-up   • Hereditary hemochromatosis     History of Present Illness:    23-year-old male for seen in August 2012 for evaluation of abnormal liver function test  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  After discussing options, patient began phlebotomies; last in November 2015  At that time, liver function tests have been normal   Patient returns for follow-up  Mr Nancy Powell feels well from a physical standpoint  Appetite is good, weight is stable  No shortness of breath or dyspnea on exertion  No headaches, blurred vision or dizziness  No GI or  issues  Activities are baseline  Patient has received his COVID vaccine  Routine health maintenance and medical care is up-to-date  Patient tried to schedule phlebotomy approximately 6 months ago  Although the specifics are not entirely clear, patient states that the blood drive was canceled at the last moment  Review of Systems   Constitutional: Negative  HENT: Negative  Eyes: Negative  Respiratory: Negative  Cardiovascular: Negative  Gastrointestinal: Negative  Endocrine: Negative  Genitourinary: Negative  Musculoskeletal: Negative  Skin: Negative  Allergic/Immunologic: Negative  Neurological: Negative  Hematological: Negative  Psychiatric/Behavioral: Negative  All other systems reviewed and are negative       Patient Active Problem List   Diagnosis   • CAD, multiple vessel   • S/P CABG x 4   • Hyperlipidemia   • Methylenetetrahydrofolate reductase deficiency (HCC)   • Elevated LFTs   • Other hemochromatosis   • Coronary artery disease involving native coronary artery of native heart with angina pectoris [facility])     Past Medical History:   Diagnosis Date   • Arthritis    • Benign essential hypertension    • Coronary artery disease    • Hearing problem    • Hx of CABG    • Hypercholesteremia    • Wears glasses     as needed   • Wears hearing aid     bilateral aids     Past Surgical History:   Procedure Laterality Date   • COLONOSCOPY  01/13/2021   • CORONARY ARTERY BYPASS GRAFT      triple   • EGD      stomach polyp   • HERNIA REPAIR Bilateral     inguinal   • KNEE SURGERY Right     ligament   • LASIK     • ME XCAPSL CTRC RMVL INSJ IO LENS PROSTH W/O ECP Left 2021    Procedure: EXTRACTION EXTRACAPSULAR CATARACT PHACO INTRAOCULAR LENS (IOL); Surgeon: Melissa Resendiz MD;  Location: [facility] MAIN OR;  Service: Ophthalmology   • ME XCAPSL CTRC RMVL INSJ IO LENS PROSTH W/O ECP Right 3/15/2021    Procedure: EXTRACTION EXTRACAPSULAR CATARACT PHACO INTRAOCULAR LENS (IOL); Surgeon: Melissa Resendiz MD;  Location: [facility] MAIN OR;  Service: Ophthalmology     Family History   Problem Relation Age of Onset   • Hypertension Mother    • Coronary artery disease Father    • Stroke Father    • Aneurysm Brother         abdominal aortic aneurysm     Social History     Socioeconomic History   • Marital status: /Civil Union     Spouse name: Not on file   • Number of children: Not on file   • Years of education: Not on file   • Highest education level: Not on file   Occupational History   • Not on file   Tobacco Use   • Smoking status: Former     Types: Cigarettes     Quit date: 1970     Years since quittin 9   • Smokeless tobacco: Never   Vaping Use   • Vaping Use: Never used   Substance and Sexual Activity   • Alcohol use: Not Currently     Comment: quit    • Drug use: Never   • Sexual activity: Not on file   Other Topics Concern   • Not on file   Social History Narrative   • Not on file     Social Determinants of Health     Financial Resource Strain: Low Risk    • Difficulty of Paying Living Expenses: Not hard at all   Food Insecurity: Not on file   Transportation Needs: No Transportation Needs   • Lack of Transportation (Medical): No   • Lack of Transportation (Non-Medical):  No   Physical Activity: Not on file   Stress: Not on file   Social Connections: Not on file   Intimate Partner Violence: Not on file   Housing Stability: Not on file       Current Outpatient Medications:   •  aspirin 81 MG tablet, Take 1 tablet by mouth daily at bedtime , Disp: , Rfl:   • atorvastatin (LIPITOR) 10 mg tablet, TAKE 1 TABLET BY MOUTH  DAILY, Disp: 90 tablet, Rfl: 3  •  Coenzyme Q10 (CoQ10) 100 MG CAPS, Take by mouth, Disp: , Rfl:   •  ibuprofen (MOTRIN) 200 mg tablet, Take 1 tablet by mouth 3 (three) times a day as needed, Disp: , Rfl:   •  losartan (COZAAR) 50 mg tablet, Take 1 tablet (50 mg total) by mouth daily, Disp: , Rfl:   •  VITAMIN D PO, Take 2,000 Units by mouth every morning , Disp: , Rfl:     Allergies   Allergen Reactions   • Acetaminophen Throat Swelling   • Amoxicillin Rash   • Lisinopril Cough       Vitals:    12/05/22 1049   BP: 107/60   Pulse: 74   Resp: 18   SpO2: 97%     Physical Exam  Constitutional:       Appearance: He is well-developed  Comments: Well-nourished male, no respiratory distress   HENT:      Head: Normocephalic and atraumatic  Right Ear: External ear normal       Left Ear: External ear normal    Eyes:      Conjunctiva/sclera: Conjunctivae normal       Pupils: Pupils are equal, round, and reactive to light  Neck:      Comments: Supple, no JVD  Cardiovascular:      Rate and Rhythm: Normal rate and regular rhythm  Heart sounds: Normal heart sounds  Pulmonary:      Effort: Pulmonary effort is normal       Breath sounds: Normal breath sounds  Abdominal:      General: Bowel sounds are normal       Palpations: Abdomen is soft  Comments: Soft, nontender, +bowel sounds, no hepatosplenomegaly   Musculoskeletal:         General: Normal range of motion  Cervical back: Normal range of motion and neck supple  Comments: Full range of motion in all 4 extremities, no pain or tenderness with palpation of joints, muscles or bones   Skin:     General: Skin is warm  Comments: Warm, moist, good color, no petechiae or ecchymoses   Neurological:      Mental Status: He is alert and oriented to person, place, and time  Deep Tendon Reflexes: Reflexes are normal and symmetric     Psychiatric:         Behavior: Behavior normal  Thought Content: Thought content normal          Judgment: Judgment normal      Extremities:   No lower extremity edema bilaterally, no cords, pulses are 1+  Lymphatics:   No adenopathy in the neck, supraclavicular region, axilla and groin bilaterally    Labs        11/28/2022 WBC = 6 1 hemoglobin = 14 6 hematocrit = 45 platelet = 514 neutrophil = 62% iron = 120 iron saturation = 43% TIBC = 282 BUN = 28 creatinine = 1 34 LFTs WNL    12/02/2021 WBC = 5 7 hemoglobin = 14 1 hematocrit = 43 platelet = 355 neutrophil = 61% UN = 17 creatinine = 1 27 LFTs WNL PSA = 1 2 iron = 124 TIBC = 277 iron saturation = 45% ferritin = 145  11/30/2020 WBC = 5 91 hemoglobin = 14 7 hematocrit = 45 MCV = 89 platelet = 959 neutrophil = 63% BUN = 18 creatinine = 1 12 LFTs WNL PSA = 1 6 iron = 96 TIBC = 278 iron saturation = 35% ferritin = 97  12/04/2019 WBC = 7 2 hemoglobin = 15 2 hematocrit = 46 7 MCV = 89 platelet = 861 neutrophil = 65% BUN = 19 creatinine = 1 30 calcium = 9 3 LFTs [facility] alkaline phosphatase = 53 ferritin = 99 PSA = 1 1  12/10/2018 WBC = 6 5 hemoglobin = 15 3 hematocrit = 47 1 platelet = 109 neutrophil = 64% BUN = 20 creatinine = 1 37 calcium = 9 4 AST = 20 ALT = 26 alkaline phosphatase = 51 total protein = 7 8 total bilirubin = 0 7 ferritin = 88  12/04/2017 WBC = 6 9 hemoglobin = 14 8 hematocrit = 45 platelet = 358 neutrophil = 60% glucose = 104 BUN = 19 creatinine = 1 16 AST = 17 ALT = 28 total bilirubin = 0 67 alkaline phosphatase = 55 ferritin = 86    Imaging    MRI of the abdomen with and without contrast performed on June 22, 2012 demonstrated 2 subcentimeter benign hemangiomas in the right hepatic lobe  Patient also had hepatic steatosis  Pathology    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

## 2022-12-08 ENCOUNTER — OFFICE VISIT (OUTPATIENT)
Dept: CARDIOLOGY CLINIC | Facility: CLINIC | Age: 82
End: 2022-12-08

## 2022-12-08 VITALS
BODY MASS INDEX: 25.88 KG/M2 | OXYGEN SATURATION: 99 % | WEIGHT: 161 LBS | HEART RATE: 66 BPM | SYSTOLIC BLOOD PRESSURE: 120 MMHG | DIASTOLIC BLOOD PRESSURE: 60 MMHG | HEIGHT: 66 IN | TEMPERATURE: 98.2 F

## 2022-12-08 DIAGNOSIS — E78.5 HYPERLIPIDEMIA, UNSPECIFIED HYPERLIPIDEMIA TYPE: ICD-10-CM

## 2022-12-08 DIAGNOSIS — Z95.1 S/P CABG X 4: Primary | ICD-10-CM

## 2022-12-08 DIAGNOSIS — I25.10 CAD, MULTIPLE VESSEL: ICD-10-CM

## 2022-12-08 DIAGNOSIS — I25.119 CORONARY ARTERY DISEASE INVOLVING NATIVE CORONARY ARTERY OF NATIVE HEART WITH ANGINA PECTORIS (HCC): ICD-10-CM

## 2022-12-08 NOTE — PROGRESS NOTES
Tanner Dixon  1940  [id number]  [facility] [address]ilee Brito  [address] [address]    Interval History:  Tanner Dixon is a 81 y o  male who presents for follow up of coronary artery disease  Since his last visit, he has been feeling well   he denies any palpitations, chest pain, shortness of breath, LE edema, orthopnea or PND  Diet is overall unchanged  There has not been a significant change in weight  He has been taking care of his wife with dementia and has increased stress due to that  He exercises with a treadmill 6 days a week/40 minutes a day  His last blood work was normal   His last LDL cholesterol was 59 mg/dL  HDL was low at 34 mg/dL    Previously, he underwent coronary artery bypass grafting on January 15, 2015  He had LIMA to LAD, SVG to PDA, SVG to OM  Catheterization was done prior to this for evaluation of chest pain  He had a 70% left main stenosis, 60% LAD stenosis, 80% circumflex disease and 90% PDA stenosis  Surgery had no complications  Cardiac risk factors include advanced age (older than 54 for men, 72 for women), dyslipidemia and male gender  Past Medical History:   Diagnosis Date   • Arthritis    • Benign essential hypertension    • Coronary artery disease    • Hearing problem    • Hx of CABG    • Hypercholesteremia    • Wears glasses     as needed   • Wears hearing aid     bilateral aids     Past Surgical History:   Procedure Laterality Date   • COLONOSCOPY  01/13/2021   • CORONARY ARTERY BYPASS GRAFT      triple   • EGD      stomach polyp   • HERNIA REPAIR Bilateral     inguinal   • KNEE SURGERY Right     ligament   • LASIK  2000   • [place] XCAPSL CTRC RMVL INSJ IO LENS PROSTH W/O ECP Left 2/8/2021    Procedure: EXTRACTION EXTRACAPSULAR CATARACT PHACO INTRAOCULAR LENS (IOL);   Surgeon: Chaparro Osman MD;  Location: [facility] MAIN OR;  Service: Ophthalmology   • [place] XCAPSL CTRC RMVL INSJ IO LENS PROSTH W/O ECP Right 3/15/2021    Procedure: EXTRACTION EXTRACAPSULAR CATARACT PHACO INTRAOCULAR LENS (IOL); Surgeon: Jen Villarreal MD;  Location: [facility] MAIN OR;  Service: Ophthalmology     Social History     Socioeconomic History   • Marital status: /Civil Union     Spouse name: Not on file   • Number of children: Not on file   • Years of education: Not on file   • Highest education level: Not on file   Occupational History   • Not on file   Tobacco Use   • Smoking status: Former     Types: Cigarettes     Quit date: 1970     Years since quittin 9   • Smokeless tobacco: Never   Vaping Use   • Vaping Use: Never used   Substance and Sexual Activity   • Alcohol use: Not Currently     Comment: quit    • Drug use: Never   • Sexual activity: Not on file   Other Topics Concern   • Not on file   Social History Narrative   • Not on file     Social Determinants of Health     Financial Resource Strain: Low Risk    • Difficulty of Paying Living Expenses: Not hard at all   Food Insecurity: Not on file   Transportation Needs: No Transportation Needs   • Lack of Transportation (Medical): No   • Lack of Transportation (Non-Medical):  No   Physical Activity: Not on file   Stress: Not on file   Social Connections: Not on file   Intimate Partner Violence: Not on file   Housing Stability: Not on file     Family History   Problem Relation Age of Onset   • Hypertension Mother    • Coronary artery disease Father    • Stroke Father    • Aneurysm Brother         abdominal aortic aneurysm       Current Outpatient Medications:   •  aspirin 81 MG tablet, Take 1 tablet by mouth daily at bedtime , Disp: , Rfl:   •  atorvastatin (LIPITOR) 10 mg tablet, TAKE 1 TABLET BY MOUTH  DAILY, Disp: 90 tablet, Rfl: 3  •  Coenzyme Q10 (CoQ10) 100 MG CAPS, Take by mouth, Disp: , Rfl:   •  ibuprofen (MOTRIN) 200 mg tablet, Take 1 tablet by mouth 3 (three) times a day as needed, Disp: , Rfl:   •  losartan (COZAAR) 50 mg tablet, Take 1 tablet (50 mg total) by mouth daily, Disp: , Rfl:   •  VITAMIN D PO, Take 2,000 Units by mouth every morning , Disp: , Rfl:   The following portions of the patient's history were reviewed and updated as appropriate: allergies, current medications, past family history, past medical history, past social history, past surgical history and problem list     Review of Systems:  Review of Systems   Respiratory: Negative for shortness of breath  Cardiovascular: Negative for chest pain and leg swelling  Musculoskeletal: Positive for arthralgias  All other systems reviewed and are negative  Physical Exam:  /60 (BP Location: Left arm, Patient Position: Sitting, Cuff Size: Large)   Pulse 66   Temp 98 2 °F (36 8 °C)   Ht 5' 6" (1 676 m)   Wt 73 kg (161 lb)   SpO2 99%   BMI 25 99 kg/m²   Physical Exam  Constitutional:       General: He is not in acute distress  Appearance: He is well-developed  He is not diaphoretic  HENT:      Head: Normocephalic and atraumatic  Eyes:      Conjunctiva/sclera: Conjunctivae normal       Pupils: Pupils are equal, round, and reactive to light  Neck:      Thyroid: No thyromegaly  Vascular: No JVD  Cardiovascular:      Rate and Rhythm: Normal rate and regular rhythm  Heart sounds: Normal heart sounds  No murmur heard  No friction rub  No gallop  Pulmonary:      Effort: Pulmonary effort is normal       Breath sounds: Normal breath sounds  Musculoskeletal:      Cervical back: Neck supple  Skin:     General: Skin is warm and dry  Findings: No erythema or rash  Neurological:      Mental Status: He is alert and oriented to person, place, and time  Cranial Nerves: No cranial nerve deficit  Psychiatric:         Behavior: Behavior normal          Thought Content: Thought content normal          Judgment: Judgment normal        Cardiographics  ECG: normal sinus rhythm, incomplete RBBB  LV Ejection Fraction: LV ejection fraction >= 40%      Imaging:No results found      Lab Review  Lab Results   Component Value Date    CHOL 160 01/14/2015    TRIG 125 11/28/2022    TRIG 92 10/05/2021    TRIG 113 04/22/2021    TRIG 100 06/01/2020    TRIG 131 01/14/2015    HDL 34 (L) 11/28/2022    HDL 32 (L) 10/05/2021    HDL 33 (L) 04/22/2021    HDL 31 (L) 06/01/2020    HDL 28 01/14/2015     Lab on 12/01/2022   Component Date Value   • Sodium 12/01/2022 141    • Potassium 12/01/2022 4 5    • Chloride 12/01/2022 110 (H)    • CO2 12/01/2022 25    • ANION GAP 12/01/2022 6    • BUN 12/01/2022 28 (H)    • Creatinine 12/01/2022 1 26    • Glucose, Fasting 12/01/2022 112 (H)    • Calcium 12/01/2022 9 3    • eGFR 12/01/2022 53    Appointment on 11/28/2022   Component Date Value   • WBC 11/28/2022 6 10    • RBC 11/28/2022 5 04    • Hemoglobin 11/28/2022 14 6    • Hematocrit 11/28/2022 45 2    • MCV 11/28/2022 90    • MCH 11/28/2022 29 0    • MCHC 11/28/2022 32 3    • RDW 11/28/2022 12 2    • MPV 11/28/2022 11 0    • Platelets 63/27/1137 284    • nRBC 11/28/2022 0    • Neutrophils Relative 11/28/2022 62    • Immat GRANS % 11/28/2022 0    • Lymphocytes Relative 11/28/2022 24    • Monocytes Relative 11/28/2022 9    • Eosinophils Relative 11/28/2022 3    • Basophils Relative 11/28/2022 2 (H)    • Neutrophils Absolute 11/28/2022 3 73    • Immature Grans Absolute 11/28/2022 0 01    • Lymphocytes Absolute 11/28/2022 1 49    • Monocytes Absolute 11/28/2022 0 57    • Eosinophils Absolute 11/28/2022 0 20    • Basophils Absolute 11/28/2022 0 10    • Sodium 11/28/2022 142    • Potassium 11/28/2022 4 1    • Chloride 11/28/2022 111 (H)    • CO2 11/28/2022 25    • ANION GAP 11/28/2022 6    • BUN 11/28/2022 28 (H)    • Creatinine 11/28/2022 1 34 (H)    • Glucose, Fasting 11/28/2022 120 (H)    • Calcium 11/28/2022 9 3    • AST 11/28/2022 16    • ALT 11/28/2022 18    • Alkaline Phosphatase 11/28/2022 57    • Total Protein 11/28/2022 7 5    • Albumin 11/28/2022 3 8    • Total Bilirubin 11/28/2022 0 83    • eGFR 11/28/2022 49    • Cholesterol 11/28/2022 118    • Triglycerides 11/28/2022 125    • HDL, Direct 11/28/2022 34 (L)    • LDL Calculated 11/28/2022 59    • Non-HDL-Chol (CHOL-HDL) 11/28/2022 84    • PSA 11/28/2022 1 0    • Iron Saturation 11/28/2022 43    • TIBC 11/28/2022 282    • Iron 11/28/2022 120    • Ferritin 11/28/2022 135      Assessment/Plan     1  S/P CABG x 4    2  Coronary artery disease involving native coronary artery of native heart with angina pectoris ([address] )    3  CAD, multiple vessel    4  Hyperlipidemia, unspecified hyperlipidemia type      -    Edema resolved with discontinuation of amlodipine  He developed a cough from lisinopril but has been tolerating losartan  BP well controlled on home monitoring  Continue losartan 50 mg daily     - LDL at goal (<70 mg/dL) - 59 on recent testing  Will repeat later this year  - continue regular exercise and diet - exercises 6 times a week     - Continue atorvastatin

## 2023-01-03 ENCOUNTER — OFFICE VISIT (OUTPATIENT)
Dept: AUDIOLOGY | Facility: CLINIC | Age: 83
End: 2023-01-03

## 2023-01-03 DIAGNOSIS — H90.3 SENSORY HEARING LOSS, BILATERAL: Primary | ICD-10-CM

## 2023-01-03 NOTE — PROGRESS NOTES
Hearing Aid Visit:    Name:  José Miguel Flanagan  :  1940  Age:  81 y o  Date of Evaluation: 23     José Miguel Flanagan is being seen for a hearing aid visit  José Miguel Flanagan is fit with [employer] OPN 3 minirite T hearing aid(s)  Right serial number [id number]  Left serial number [id number]  Warranty date: OOW 2020 (Loss/Damage and repair)  Patient reports more consistent wear during the cooler months  Action:  1  Canals are clear   2  Changed Pro Wax guards / internal guards are clear  3  Vacuumed all ports  4  Aids were in good working order via listening check    Recommendations:   1  RTO 8/3/23 for annual audiological evaluation and HA check  Patient will need order for testing at that time       Marina Diggs , CCC-A, [place]# [id number]  Clinical Audiologist

## 2023-02-14 ENCOUNTER — OFFICE VISIT (OUTPATIENT)
Dept: FAMILY MEDICINE CLINIC | Facility: CLINIC | Age: 83
End: 2023-02-14

## 2023-02-14 VITALS
RESPIRATION RATE: 16 BRPM | BODY MASS INDEX: 26.97 KG/M2 | WEIGHT: 167.8 LBS | DIASTOLIC BLOOD PRESSURE: 90 MMHG | SYSTOLIC BLOOD PRESSURE: 150 MMHG | HEIGHT: 66 IN | HEART RATE: 68 BPM | TEMPERATURE: 96.7 F

## 2023-02-14 DIAGNOSIS — R05.1 ACUTE COUGH: Primary | ICD-10-CM

## 2023-02-14 RX ORDER — BENZONATATE 200 MG/1
200 CAPSULE ORAL 3 TIMES DAILY PRN
Qty: 20 CAPSULE | Refills: 0 | Status: SHIPPED | OUTPATIENT
Start: 2023-02-14

## 2023-02-14 RX ORDER — AZITHROMYCIN 250 MG/1
TABLET, FILM COATED ORAL
Qty: 6 TABLET | Refills: 0 | Status: SHIPPED | OUTPATIENT
Start: 2023-02-14 | End: 2023-02-18

## 2023-02-14 NOTE — PROGRESS NOTES
Chief Complaint   Patient presents with   • Sinus Problem   • post nasal drip   • Cough        Patient ID: Hanna Roldan is a 81 y o  male  Cough  This is a new problem  The current episode started 1 to 4 weeks ago  The problem has been unchanged  The cough is non-productive  Pertinent negatives include no chest pain, chills, ear congestion, ear pain, fever, headaches, nasal congestion (resolved ), postnasal drip, rhinorrhea (resolved ), sore throat or wheezing  Nothing aggravates the symptoms  He has tried nothing for the symptoms  The treatment provided no relief  There is no history of asthma, bronchiectasis, bronchitis, COPD or pneumonia  started as URI -  resolved except for cough          The following portions of the patient's history were reviewed and updated as appropriate: allergies, current medications, past family history, past medical history, past social history, past surgical history and problem list     Review of Systems   Constitutional: Negative for chills and fever  HENT: Negative for ear pain, postnasal drip, rhinorrhea (resolved ) and sore throat  Respiratory: Positive for cough  Negative for wheezing  Cardiovascular: Negative for chest pain  Gastrointestinal: Negative  Neurological: Negative for headaches  Current Outpatient Medications   Medication Sig Dispense Refill   • aspirin 81 MG tablet Take 1 tablet by mouth daily at bedtime      • atorvastatin (LIPITOR) 10 mg tablet TAKE 1 TABLET BY MOUTH  DAILY 90 tablet 3   • Coenzyme Q10 (CoQ10) 100 MG CAPS Take by mouth     • ibuprofen (MOTRIN) 200 mg tablet Take 1 tablet by mouth 3 (three) times a day as needed     • losartan (COZAAR) 50 mg tablet Take 1 tablet (50 mg total) by mouth daily     • VITAMIN D PO Take 2,000 Units by mouth every morning        No current facility-administered medications for this visit         Objective:    /90 (BP Location: Left arm, Patient Position: Sitting, Cuff Size: Standard)   Pulse 68   Temp (!) 96 7 °F (35 9 °C)   Resp 16   Ht 5' 6" (1 676 m)   Wt 76 1 kg (167 lb 12 8 oz)   BMI 27 08 kg/m²        Physical Exam  Constitutional:       Appearance: He is not ill-appearing  HENT:      Nose: No congestion or rhinorrhea  Mouth/Throat:      Pharynx: No oropharyngeal exudate or posterior oropharyngeal erythema  Pulmonary:      Effort: Pulmonary effort is normal  No respiratory distress  Breath sounds: No wheezing, rhonchi or rales  Neurological:      Mental Status: He is alert  Assessment/Plan:         Diagnoses and all orders for this visit:    Acute cough  -     benzonatate (TESSALON) 200 MG capsule; Take 1 capsule (200 mg total) by mouth 3 (three) times a day as needed for cough  -     azithromycin (ZITHROMAX) 250 mg tablet;  Take 2 tablets today then 1 tablet daily x 4 days      rto prn                     Celia Mckay MD

## 2023-02-15 ENCOUNTER — HOSPITAL ENCOUNTER (OUTPATIENT)
Dept: RADIOLOGY | Facility: HOSPITAL | Age: 83
Discharge: HOME | End: 2023-02-15
Attending: FAMILY MEDICINE

## 2023-02-15 VITALS — WEIGHT: 165 LBS | BODY MASS INDEX: 25.9 KG/M2 | HEIGHT: 67 IN

## 2023-02-15 DIAGNOSIS — M85.80 OSTEOPENIA, UNSPECIFIED LOCATION: ICD-10-CM

## 2023-03-01 ENCOUNTER — TELEPHONE (OUTPATIENT)
Dept: FAMILY MEDICINE CLINIC | Facility: CLINIC | Age: 83
End: 2023-03-01

## 2023-03-01 DIAGNOSIS — R35.0 URINE FREQUENCY: Primary | ICD-10-CM

## 2023-03-01 DIAGNOSIS — I10 ESSENTIAL HYPERTENSION: ICD-10-CM

## 2023-03-01 RX ORDER — TAMSULOSIN HYDROCHLORIDE 0.4 MG/1
0.4 CAPSULE ORAL
Qty: 90 CAPSULE | Refills: 0 | Status: SHIPPED | OUTPATIENT
Start: 2023-03-01 | End: 2023-05-22 | Stop reason: SDUPTHER

## 2023-03-01 RX ORDER — LOSARTAN POTASSIUM 50 MG/1
50 TABLET ORAL DAILY
Qty: 90 TABLET | Refills: 1 | Status: SHIPPED | OUTPATIENT
Start: 2023-03-01 | End: 2023-05-03 | Stop reason: SDUPTHER

## 2023-03-01 NOTE — TELEPHONE ENCOUNTER
Spoke to patient for awhile  BP is elevated more in the evening  Recommend NOT taking meds tomorrow,Calling us and letting us know his reading    If its 160/90 Recommend taking Losartan 50mgs NOT 100mgs    Starting Urine Freq at night ( flomax started that will help with BP)    Advised this all might change once I get readings

## 2023-03-01 NOTE — TELEPHONE ENCOUNTER
Pt saw Dr Birgit Gallegos on 2/14 and his bp went back up to 170/82 so he started taking losartan 100mg again a few days later  This am his bp was 95/49  Please advise was dose he should be taking whether it should be 50mg or 75mg

## 2023-03-02 ENCOUNTER — TELEPHONE (OUTPATIENT)
Dept: FAMILY MEDICINE CLINIC | Facility: CLINIC | Age: 83
End: 2023-03-02

## 2023-03-02 NOTE — TELEPHONE ENCOUNTER
Patient called regarding BP readings  I asked if he wanted to communicate them to me but he asked that you call him directly

## 2023-03-02 NOTE — TELEPHONE ENCOUNTER
6:45am 193/92 went back to bed  8am 157/84  8:20 144/75    Advised to start new HCTZ 12 5 and HOLD losartan for now  Its going from very low 95/49 9 :20 yesterday    He is calling again at 4pm     Dr Bates Neighbours Are you able to bring the patient in to discuss this   Not sure if a stress test might be needed

## 2023-03-03 DIAGNOSIS — I10 ESSENTIAL HYPERTENSION: Primary | ICD-10-CM

## 2023-03-03 RX ORDER — HYDROCHLOROTHIAZIDE 12.5 MG/1
12.5 TABLET ORAL DAILY
Qty: 30 TABLET | Refills: 0 | Status: SHIPPED | OUTPATIENT
Start: 2023-03-03 | End: 2023-05-03

## 2023-03-03 NOTE — TELEPHONE ENCOUNTER
Patient wasn't given HCTZ  Patient is to take HCTZ 12 5mg/Losartan 50mgs every morning  Give it at least 3 days to kick in   IF he calls over the weekend   OK TO PAGE ME DIRECTLY

## 2023-03-03 NOTE — TELEPHONE ENCOUNTER
Patient says last night bp was 198/89 at 7:10 pm  This morning at 8:05 am it was 166/79  He wants to know if know losartan dosage should be increased from 50mg  He accidentally took old pill losartan 100mg last night  Please call patient to discuss

## 2023-03-06 NOTE — TELEPHONE ENCOUNTER
Please advise him to Take Losartan in the AM and HCTZ in the PM tomorrow  BP should go up with water/ and a little soda   If it doesn't to call us

## 2023-03-06 NOTE — TELEPHONE ENCOUNTER
Pt's 126/27 this am and then after taking bp med/water pill and going to gym it was 85/47  It was 97/52 at 12:30  Please advise

## 2023-03-07 NOTE — TELEPHONE ENCOUNTER
I spoke to pt  He stated when he woke up this morning and his BP was 127/67 and he did not take the HCTZ last night  Pt went to the gym and when he came home his BP was 96/49 then he rechecked it and it was 90/47  Pt stated he has not taken any BP medication today  He stated he does not want his BP to go any lower  Please advise

## 2023-03-07 NOTE — TELEPHONE ENCOUNTER
Patient needs further explanation from clinical staff  He's reluctant to take the medication because his reading was 90/47 this morning after going to the gym and he does not want it to go any lower  I asked if he has checked his monitor for accuracy but he said it was new

## 2023-03-08 ENCOUNTER — TELEPHONE (OUTPATIENT)
Dept: CARDIOLOGY CLINIC | Facility: CLINIC | Age: 83
End: 2023-03-08

## 2023-03-08 NOTE — TELEPHONE ENCOUNTER
PATIENT CALLED 90/50 @ 830 AM AFTER THE GYM    HE IS CONCERNED :   PLEASE CALL PATIENT TO DISCUSS   [phone]

## 2023-03-08 NOTE — TELEPHONE ENCOUNTER
Continue on losartan 50 mgs in the AM  If low HOLD in the Am till after the GYN       Highly recommend seeing cardiology to consider a stress test

## 2023-03-09 ENCOUNTER — TELEPHONE (OUTPATIENT)
Dept: CARDIOLOGY CLINIC | Facility: CLINIC | Age: 83
End: 2023-03-09

## 2023-03-09 ENCOUNTER — CLINICAL SUPPORT (OUTPATIENT)
Dept: CARDIOLOGY CLINIC | Facility: CLINIC | Age: 83
End: 2023-03-09

## 2023-03-09 VITALS
HEART RATE: 78 BPM | DIASTOLIC BLOOD PRESSURE: 66 MMHG | SYSTOLIC BLOOD PRESSURE: 118 MMHG | BODY MASS INDEX: 25.27 KG/M2 | HEIGHT: 67 IN | OXYGEN SATURATION: 96 % | WEIGHT: 161 LBS

## 2023-03-09 DIAGNOSIS — I25.119 CORONARY ARTERY DISEASE INVOLVING NATIVE CORONARY ARTERY OF NATIVE HEART WITH ANGINA PECTORIS (HCC): Primary | ICD-10-CM

## 2023-03-09 NOTE — TELEPHONE ENCOUNTER
Patient called to advise bp this morning was 150/73 at 6:45 am  He did not take medication at 7am  He went to gym, and after that it was 103/59 at 8:15  He took med then  He took water pill and flomax around 8pm last night  Patient will be calling Dr Jean Claude Jesus office for appointment  Patient is aware that Dr Karla Abraham is out of the office until 3/13

## 2023-03-09 NOTE — PROGRESS NOTES
Pt denies SOB, chest pain or headaches  States his BP has been fluctuating  Pt has been taking his BP multiple times per day since december  states it is Higher in the morning  States his BP drops after the gym, he feels dizzy and lightheaded, pt states he does not eat before going to the gym  He has started only checking it twice per day  We checked his home BP machine against doing it manually, home machine is accurate  Per Dr Gary Marin, pt to take losartan 50mg one tablet in the morning and HCTZ 12 5 half a tablet in the evening  Pt agreeable  BP log scanned into chart

## 2023-03-09 NOTE — TELEPHONE ENCOUNTER
Pt called after gym to report BP reading at 103/59  Before gym it was 150/73  He also is confused about taking his water pill or not  PCP recommend to still take it

## 2023-03-10 NOTE — TELEPHONE ENCOUNTER
Spoke to patient  BP controlled today   Went to see cardiology who advised to take losartan 50mg and 1/2 tablet of hctz 12 5mg

## 2023-03-13 ENCOUNTER — TELEPHONE (OUTPATIENT)
Dept: FAMILY MEDICINE CLINIC | Facility: CLINIC | Age: 83
End: 2023-03-13

## 2023-03-13 NOTE — TELEPHONE ENCOUNTER
Pt's bp Saturday am was 137/73 and then after the gym it was 111/60  Sunday at 645am it was 128/72, at 10am and no gym it was 83/44  This am was 129/73 and 102/50 after the gym  He takes his bp meds after he gets home from the gym

## 2023-03-14 NOTE — TELEPHONE ENCOUNTER
Spoke to patient  Hold HCTZ  Made appointment with cardiology  His Cardiologist is out of office this week   Will message surgeon and cardiologist per patients request

## 2023-03-15 ENCOUNTER — TELEPHONE (OUTPATIENT)
Dept: FAMILY MEDICINE CLINIC | Facility: CLINIC | Age: 83
End: 2023-03-15

## 2023-03-15 NOTE — TELEPHONE ENCOUNTER
Dr Marquez Fifi:    FYI: Patient's BP on was    3/14 -   142/77 6:45 AM   112/56 8:15 AM    3/15 - 170/80 6:45 AM  Patient took BP Medication at 7:00 AM   108/54 8:20 AM    Please c/b to discuss further

## 2023-03-16 ENCOUNTER — OFFICE VISIT (OUTPATIENT)
Dept: CARDIOLOGY CLINIC | Facility: CLINIC | Age: 83
End: 2023-03-16

## 2023-03-16 VITALS
DIASTOLIC BLOOD PRESSURE: 68 MMHG | HEART RATE: 72 BPM | SYSTOLIC BLOOD PRESSURE: 132 MMHG | WEIGHT: 160 LBS | HEIGHT: 67 IN | BODY MASS INDEX: 25.11 KG/M2

## 2023-03-16 DIAGNOSIS — I25.119 CORONARY ARTERY DISEASE INVOLVING NATIVE CORONARY ARTERY OF NATIVE HEART WITH ANGINA PECTORIS (HCC): ICD-10-CM

## 2023-03-16 DIAGNOSIS — E78.2 MIXED HYPERLIPIDEMIA: ICD-10-CM

## 2023-03-16 DIAGNOSIS — N18.31 STAGE 3A CHRONIC KIDNEY DISEASE (HCC): ICD-10-CM

## 2023-03-16 DIAGNOSIS — I25.10 CAD, MULTIPLE VESSEL: ICD-10-CM

## 2023-03-16 DIAGNOSIS — Z15.89 MTHFR MUTATION: ICD-10-CM

## 2023-03-16 DIAGNOSIS — I10 ESSENTIAL HYPERTENSION: Primary | ICD-10-CM

## 2023-03-16 DIAGNOSIS — G47.20 ABNORMAL CIRCADIAN RHYTHM: ICD-10-CM

## 2023-03-16 DIAGNOSIS — Z95.1 S/P CABG X 4: ICD-10-CM

## 2023-03-16 NOTE — PROGRESS NOTES
[facility]  [address]  [address]   [place], [address]  Cardiology Consultation  Julia Hdez  1940  [phone]  [facility]  [address]  [address] [facility], [place] [address] [place]  [phone]    1  Essential hypertension  POCT ECG    Home Study    Stress test only, exercise      2  Coronary artery disease involving native coronary artery of native heart with angina pectoris (HCC)  POCT ECG      3  CAD, multiple vessel  POCT ECG      4  S/P CABG x 4  POCT ECG    Stress test only, exercise      5  Mixed hyperlipidemia  POCT ECG    Stress test only, exercise      6  MTHFR mutation  POCT ECG      7  Abnormal circadian rhythm  Home Study      8  Stage 3a chronic kidney disease (HCC)           Discussion/Summary:   Labile hypertension-blood pressures are elevated early in the a m  Daughter states he snores  Patient reports having abnormal circadian rhythm  Possible untreated sleep apnea? Both his daughter and son have sleep disordered breathing  Plan for home sleep study  Coronary artery disease with prior bypass surgery-plan for exercise treadmill stress test to evaluate for ischemia  His last LDL was below 70  He is currently on atorvastatin 10 mg  Discussed about raising his HDL  Increase omega-3 supplementation and exercise  RTC- Dr Ry Novak post workup        Interval History:   70-year-old gentleman with previous history of coronary artery disease status post bypass surgery presents with episodes of increased blood pressure early in the a m  He is concerned as his blood pressures have been high  During the day they normalize  At times he has low blood pressures  He is currently taking losartan 50 mg  He reports being physically active  He denies currently having chest heaviness  His daughter accompanies him who says she has witnessed him not breathing well at night    He does report snoring  Past Medical History:   Diagnosis Date   • Arthritis    • Benign essential hypertension    • Coronary artery disease    • Hearing problem    • Hx of CABG    • Hypercholesteremia    • Wears glasses     as needed   • Wears hearing aid     bilateral aids     Social History     Socioeconomic History   • Marital status: /Civil Union     Spouse name: Not on file   • Number of children: Not on file   • Years of education: Not on file   • Highest education level: Not on file   Occupational History   • Not on file   Tobacco Use   • Smoking status: Former     Types: Cigarettes     Quit date: 1970     Years since quittin 2   • Smokeless tobacco: Never   Vaping Use   • Vaping Use: Never used   Substance and Sexual Activity   • Alcohol use: Not Currently     Comment: quit    • Drug use: Never   • Sexual activity: Not on file   Other Topics Concern   • Not on file   Social History Narrative   • Not on file     Social Determinants of Health     Financial Resource Strain: Low Risk    • Difficulty of Paying Living Expenses: Not hard at all   Food Insecurity: Not on file   Transportation Needs: No Transportation Needs   • Lack of Transportation (Medical): No   • Lack of Transportation (Non-Medical):  No   Physical Activity: Not on file   Stress: Not on file   Social Connections: Not on file   Intimate Partner Violence: Not on file   Housing Stability: Not on file      Family History   Problem Relation Age of Onset   • Hypertension Mother    • Coronary artery disease Father    • Stroke Father    • Aneurysm Brother         abdominal aortic aneurysm     Past Surgical History:   Procedure Laterality Date   • COLONOSCOPY  2021   • CORONARY ARTERY BYPASS GRAFT      triple   • EGD      stomach polyp   • HERNIA REPAIR Bilateral     inguinal   • KNEE SURGERY Right     ligament   • LASIK     • FL XCAPSL CTRC RMVL INSJ IO LENS PROSTH W/O ECP Left 2021    Procedure: EXTRACTION EXTRACAPSULAR CATARACT PHACO INTRAOCULAR LENS (IOL); Surgeon: Fabiana Millan MD;  Location: [facility];  Service: Ophthalmology   • RI XCAPSL CTRC RMVL INSJ IO LENS PROSTH W/O ECP Right 3/15/2021    Procedure: EXTRACTION EXTRACAPSULAR CATARACT PHACO INTRAOCULAR LENS (IOL); Surgeon: Fabiana Millan MD;  Location: [facility];  Service: Ophthalmology       Current Outpatient Medications:   •  aspirin 81 MG tablet, Take 1 tablet by mouth daily at bedtime , Disp: , Rfl:   •  atorvastatin (LIPITOR) 10 mg tablet, TAKE 1 TABLET BY MOUTH  DAILY, Disp: 90 tablet, Rfl: 3  •  Coenzyme Q10 (CoQ10) 100 MG CAPS, Take by mouth, Disp: , Rfl:   •  losartan (COZAAR) 50 mg tablet, Take 1 tablet (50 mg total) by mouth daily, Disp: 90 tablet, Rfl: 1  •  tamsulosin (FLOMAX) 0 4 mg, Take 1 capsule (0 4 mg total) by mouth daily with dinner, Disp: 90 capsule, Rfl: 0  •  VITAMIN D PO, Take 2,000 Units by mouth every morning , Disp: , Rfl:   •  benzonatate (TESSALON) 200 MG capsule, Take 1 capsule (200 mg total) by mouth 3 (three) times a day as needed for cough (Patient not taking: Reported on 3/9/2023), Disp: 20 capsule, Rfl: 0  •  hydrochlorothiazide (HYDRODIURIL) 12 5 mg tablet, Take 1 tablet (12 5 mg total) by mouth daily (Patient not taking: Reported on 3/16/2023), Disp: 30 tablet, Rfl: 0  •  ibuprofen (MOTRIN) 200 mg tablet, Take 1 tablet by mouth 3 (three) times a day as needed (Patient not taking: Reported on 3/9/2023), Disp: , Rfl:   Allergies   Allergen Reactions   • Acetaminophen Throat Swelling   • Amoxicillin Rash   • Lisinopril Cough     Vitals:    03/16/23 1242 03/16/23 1248 03/16/23 1251   BP: 140/68 130/64 132/68   BP Location: Right arm Right arm Right arm   Patient Position: Supine Sitting Standing   Cuff Size: Standard Standard Standard   Pulse: 59 64 72   Weight: 72 6 kg (160 lb)     Height: 5' 6 5" (1 689 m)         Review of Systems:   Review of Systems   Constitutional: Positive for fatigue  HENT: Negative  Eyes: Negative  Respiratory: Negative  Cardiovascular: Negative  Gastrointestinal: Negative  Endocrine: Negative  Genitourinary: Negative  Musculoskeletal: Negative  Skin: Negative  Allergic/Immunologic: Negative  Neurological: Negative  Hematological: Negative  Psychiatric/Behavioral: Negative  Vitals:    03/16/23 1242 03/16/23 1248 03/16/23 1251   BP: 140/68 130/64 132/68   BP Location: Right arm Right arm Right arm   Patient Position: Supine Sitting Standing   Cuff Size: Standard Standard Standard   Pulse: 59 64 72   Weight: 72 6 kg (160 lb)     Height: 5' 6 5" (1 689 m)       Physical Examination:   Physical Exam  Constitutional:       General: He is not in acute distress  Appearance: He is well-developed  He is not diaphoretic  HENT:      Head: Normocephalic and atraumatic  Right Ear: External ear normal       Left Ear: External ear normal    Eyes:      General: No scleral icterus  Right eye: No discharge  Left eye: No discharge  Conjunctiva/sclera: Conjunctivae normal       Pupils: Pupils are equal, round, and reactive to light  Neck:      Thyroid: No thyromegaly  Vascular: No JVD  Trachea: No tracheal deviation  Cardiovascular:      Rate and Rhythm: Normal rate and regular rhythm  Heart sounds: Murmur heard  No friction rub  Gallop present  Pulmonary:      Effort: Pulmonary effort is normal  No respiratory distress  Breath sounds: Normal breath sounds  No stridor  No wheezing or rales  Chest:      Chest wall: No tenderness  Abdominal:      General: Bowel sounds are normal  There is no distension  Palpations: Abdomen is soft  There is no mass  Tenderness: There is no abdominal tenderness  There is no guarding or rebound  Musculoskeletal:         General: No tenderness or deformity  Normal range of motion  Cervical back: Normal range of motion and neck supple  Skin:     General: Skin is warm and dry  Coloration: Skin is not pale  Findings: No erythema or rash  Neurological:      Mental Status: He is alert and oriented to person, place, and time  Cranial Nerves: No cranial nerve deficit  Motor: No abnormal muscle tone  Coordination: Coordination normal       Deep Tendon Reflexes: Reflexes are normal and symmetric  Reflexes normal    Psychiatric:         Behavior: Behavior normal          Thought Content: Thought content normal          Judgment: Judgment normal          Labs:     Lab Results   Component Value Date    WBC 6 10 11/28/2022    HGB 14 6 11/28/2022    HCT 45 2 11/28/2022    MCV 90 11/28/2022    RDW 12 2 11/28/2022     11/28/2022     BMP:  Lab Results   Component Value Date    SODIUM 141 12/01/2022    K 4 5 12/01/2022     (H) 12/01/2022    CO2 25 12/01/2022    ANIONGAP 11 3 10/23/2015    BUN 28 (H) 12/01/2022    CREATININE 1 26 12/01/2022    GLUC 99 06/01/2020    GLUF 112 (H) 12/01/2022    CALCIUM 9 3 12/01/2022    EGFR 53 12/01/2022    MG 2 1 01/16/2015     LFT:  Lab Results   Component Value Date    AST 16 11/28/2022    ALT 18 11/28/2022    ALKPHOS 57 11/28/2022    TP 7 5 11/28/2022    ALB 3 8 11/28/2022      Lab Results   Component Value Date    KIS0JJZAFODV 0 963 01/15/2015     Lab Results   Component Value Date    HGBA1C 5 7 12/04/2019     Lipid Profile:   Lab Results   Component Value Date    CHOLESTEROL 118 11/28/2022    HDL 34 (L) 11/28/2022    LDLCALC 59 11/28/2022    TRIG 125 11/28/2022     Lab Results   Component Value Date    CHOLESTEROL 118 11/28/2022    CHOLESTEROL 111 10/05/2021     No results found for: CKTOTAL, CKMB, CKMBINDEX, TROPONINI  Lab Results   Component Value Date    NTBNP 192 04/22/2021      No results found for this or any previous visit (from the past 672 hour(s))  Imaging & Testing   I have personally reviewed pertinent reports  Cardiac Testing       EKG: Personally reviewed      Sinus bradycardia with right ventricular conduction delay no acute ST changes      Ashwini Zhou  [phone]  Please call with any questions or suggestions    Counseling :  A description of the counseling:   Goals and Barriers:  Patient's ability to self care:  Medication side effect reviewed with patient in detail and all their questions answered  "Portions of the record may have been created with voice recognition software  Occasional wrong word or "sound a like" substitutions may have occurred due to the inherent limitations of voice recognition software  Read the chart carefully and recognize, using context, where substitutions have occurred   Please call if you have any questions  "

## 2023-03-20 ENCOUNTER — TELEPHONE (OUTPATIENT)
Dept: SLEEP CENTER | Facility: CLINIC | Age: 83
End: 2023-03-20

## 2023-03-20 NOTE — TELEPHONE ENCOUNTER
----- Message from Abernathynuha Mancilla DO sent at 3/20/2023  8:43 AM EDT -----  approved  ----- Message -----  From: Daren Chisholm  Sent: 3/16/2023   2:34 PM EDT  To: Sleep Medicine Jami Juanjose Provider    This Home sleep study needs approval      If approved please sign and return to clerical pool  If denied please include reasons why  Also provide alternative testing if warranted  Please sign and return to clerical pool

## 2023-03-23 ENCOUNTER — TELEPHONE (OUTPATIENT)
Dept: CARDIOLOGY CLINIC | Facility: CLINIC | Age: 83
End: 2023-03-23

## 2023-03-23 ENCOUNTER — HOSPITAL ENCOUNTER (OUTPATIENT)
Dept: NON INVASIVE DIAGNOSTICS | Facility: HOSPITAL | Age: 83
Discharge: HOME | End: 2023-03-23
Attending: INTERNAL MEDICINE

## 2023-03-23 DIAGNOSIS — I10 ESSENTIAL HYPERTENSION: ICD-10-CM

## 2023-03-23 DIAGNOSIS — Z95.1 S/P CABG X 4: Primary | ICD-10-CM

## 2023-03-23 DIAGNOSIS — Z95.1 S/P CABG X 4: ICD-10-CM

## 2023-03-23 DIAGNOSIS — R94.39 ABNORMAL STRESS ECG WITH TREADMILL: ICD-10-CM

## 2023-03-23 DIAGNOSIS — E78.2 MIXED HYPERLIPIDEMIA: ICD-10-CM

## 2023-03-23 LAB
MAX HR PERCENT: 87 %
MAX HR: 122 BPM
RATE PRESSURE PRODUCT: NORMAL
SL CV STRESS RECOVERY BP: NORMAL MMHG
SL CV STRESS RECOVERY HR: 87 BPM
SL CV STRESS RECOVERY O2 SAT: 98 %
STRESS ANGINA INDEX: 1
STRESS BASELINE BP: NORMAL MMHG
STRESS BASELINE HR: 82 BPM
STRESS O2 SAT REST: 98 %
STRESS PEAK HR: 120 BPM
STRESS POST ESTIMATED WORKLOAD: 13.4 METS
STRESS POST EXERCISE DUR MIN: 11 MIN
STRESS POST EXERCISE DUR SEC: 22 SEC
STRESS POST O2 SAT PEAK: 98 %
STRESS POST PEAK BP: 190 MMHG

## 2023-03-24 ENCOUNTER — TELEPHONE (OUTPATIENT)
Dept: CARDIOLOGY CLINIC | Facility: CLINIC | Age: 83
End: 2023-03-24

## 2023-03-24 LAB
CHEST PAIN STATEMENT: NORMAL
MAX DIASTOLIC BP: 64 MMHG
MAX HEART RATE: 122 BPM
MAX PREDICTED HEART RATE: 139 BPM
MAX. SYSTOLIC BP: 174 MMHG
PROTOCOL NAME: NORMAL
REASON FOR TERMINATION: NORMAL
TARGET HR FORMULA: NORMAL
TEST INDICATION: NORMAL
TIME IN EXERCISE PHASE: NORMAL

## 2023-03-24 NOTE — TELEPHONE ENCOUNTER
----- Message from Tamar Crawford MD sent at 3/23/2023  7:20 PM EDT -----  He had some nonspecific ekg changes on stress test  His blood pressure was mildly elevated   If he is agreeable would like him to perform nuclear stress test

## 2023-03-31 ENCOUNTER — HOSPITAL ENCOUNTER (OUTPATIENT)
Dept: NON INVASIVE DIAGNOSTICS | Facility: HOSPITAL | Age: 83
Discharge: HOME | End: 2023-03-31
Attending: INTERNAL MEDICINE

## 2023-03-31 ENCOUNTER — HOSPITAL ENCOUNTER (OUTPATIENT)
Dept: RADIOLOGY | Facility: HOSPITAL | Age: 83
Discharge: HOME | End: 2023-03-31
Attending: INTERNAL MEDICINE

## 2023-03-31 DIAGNOSIS — R94.39 ABNORMAL STRESS ECG WITH TREADMILL: ICD-10-CM

## 2023-03-31 DIAGNOSIS — Z95.1 S/P CABG X 4: ICD-10-CM

## 2023-03-31 LAB
CHEST PAIN STATEMENT: NORMAL
MAX DIASTOLIC BP: 80 MMHG
MAX HEART RATE: 121 BPM
MAX HR PERCENT: 87 %
MAX HR: 121 BPM
MAX PREDICTED HEART RATE: 139 BPM
MAX. SYSTOLIC BP: 210 MMHG
NUC STRESS EJECTION FRACTION: 66 %
PROTOCOL NAME: NORMAL
RATE PRESSURE PRODUCT: NORMAL
REASON FOR TERMINATION: NORMAL
SL CV REST NUCLEAR ISOTOPE DOSE: 10.81 MCI
SL CV STRESS NUCLEAR ISOTOPE DOSE: 32 MCI
SL CV STRESS RECOVERY BP: NORMAL MMHG
SL CV STRESS RECOVERY HR: 87 BPM
SL CV STRESS RECOVERY O2 SAT: 98 %
SL CV STRESS STAGE REACHED: 4
STRESS ANGINA INDEX: 0
STRESS BASELINE BP: NORMAL MMHG
STRESS BASELINE HR: 81 BPM
STRESS O2 SAT REST: 98 %
STRESS PEAK HR: 118 BPM
STRESS POST ESTIMATED WORKLOAD: 13.4 METS
STRESS POST EXERCISE DUR MIN: 12 MIN
STRESS POST O2 SAT PEAK: 98 %
STRESS POST PEAK BP: 204 MMHG
TARGET HR FORMULA: NORMAL
TEST INDICATION: NORMAL
TIME IN EXERCISE PHASE: NORMAL

## 2023-04-06 ENCOUNTER — TELEPHONE (OUTPATIENT)
Dept: CARDIOLOGY CLINIC | Facility: CLINIC | Age: 83
End: 2023-04-06

## 2023-04-06 ENCOUNTER — HOSPITAL ENCOUNTER (OUTPATIENT)
Dept: SLEEP CENTER | Facility: CLINIC | Age: 83
Discharge: HOME | End: 2023-04-06

## 2023-04-06 DIAGNOSIS — G47.20 ABNORMAL CIRCADIAN RHYTHM: ICD-10-CM

## 2023-04-06 DIAGNOSIS — I10 ESSENTIAL HYPERTENSION: ICD-10-CM

## 2023-04-06 NOTE — TELEPHONE ENCOUNTER
Patient called to report that this morning at the gym he had a syncopal episode and fainted  He states that he was sweating - could have been dehydrated  Called seeking recommendations  EMS came and he said he feels fine now  - Permission to leave a message granted

## 2023-04-08 NOTE — PROGRESS NOTES
Home Sleep Study Documentation    HOME STUDY DEVICE: Noxturnal no                                           Gisela G3 yes      Pre-Sleep Home Study:    Set-up and instructions performed by: Willie Rodriguez performed demonstration for Patient: yes    Return demonstration performed by Patient: yes    Written instructions provided to Patient: yes    Patient signed consent form: yes        Post-Sleep Home Study:    Additional comments by Patient: none    Home Sleep Study Failed:no:    Failure reason: N/A    Reported or Detected: N/A    Scored by:  JONNIE Elise

## 2023-04-24 ENCOUNTER — TELEPHONE (OUTPATIENT)
Dept: HEMATOLOGY ONCOLOGY | Facility: CLINIC | Age: 83
End: 2023-04-24

## 2023-04-24 NOTE — TELEPHONE ENCOUNTER
Appointment Change  Cancel, Reschedule, Change to Virtual      Who are you speaking with? Patient   If it is not the patient, are they listed on an active communication consent form? N/A   Which provider is the appointment scheduled with? Dr West Rea   When is the appointment scheduled? Please list date and time 12/11/23   At which location is the appointment scheduled to take place? [facility]   Was the appointment rescheduled or changed from an in person visit to a virtual visit? If so, please list the details of the change  12/18/23   What is the reason for the appointment change? Provider on rounds   Was [facility]AR transport scheduled for this visit? No   Does STAR transport need to be scheduled for the new visit (if applicable) No   Does the patient need an infusion appointment rescheduled? No   Does the patient have an infusion appointment scheduled? If so, when? No   Is the patient undergoing chemotherapy? No   Was the no-show policy reviewed for appointments being changed with less then 24 hours of notice?  Yes

## 2023-05-03 ENCOUNTER — OFFICE VISIT (OUTPATIENT)
Dept: CARDIOLOGY CLINIC | Facility: CLINIC | Age: 83
End: 2023-05-03

## 2023-05-03 VITALS
HEART RATE: 68 BPM | DIASTOLIC BLOOD PRESSURE: 64 MMHG | HEIGHT: 67 IN | SYSTOLIC BLOOD PRESSURE: 128 MMHG | WEIGHT: 164 LBS | OXYGEN SATURATION: 98 % | BODY MASS INDEX: 25.74 KG/M2

## 2023-05-03 DIAGNOSIS — E78.2 MIXED HYPERLIPIDEMIA: ICD-10-CM

## 2023-05-03 DIAGNOSIS — I10 ESSENTIAL HYPERTENSION: ICD-10-CM

## 2023-05-03 DIAGNOSIS — Z95.1 S/P CABG X 4: ICD-10-CM

## 2023-05-03 DIAGNOSIS — Z15.89 MTHFR MUTATION: ICD-10-CM

## 2023-05-03 DIAGNOSIS — I25.10 CAD, MULTIPLE VESSEL: Primary | ICD-10-CM

## 2023-05-03 RX ORDER — LOSARTAN POTASSIUM 50 MG/1
50 TABLET ORAL DAILY
Qty: 90 TABLET | Refills: 1 | Status: SHIPPED | OUTPATIENT
Start: 2023-05-03

## 2023-05-03 NOTE — PROGRESS NOTES
Estuardo Garg  1940  [id number]      Interval History:  Estuardo Garg is a 81 y o  male who presents for follow up of coronary artery disease  Since his last visit, he was having episodes of lightheadedness, fatigue  BP has been elevated  He had stress test done which was normal   He is feeling better  BP has returned to normal   He is exercising regularly without any difficulties  Denies any chest pain, shortness of breath, LE Edema, orthopnea or PND  He exercises with a treadmill 6 days a week/40 minutes a day  His last blood work was normal   His last LDL cholesterol was 59 mg/dL  HDL was low at 34 mg/dL    Previously, he underwent coronary artery bypass grafting on January 15, 2015  He had LIMA to LAD, SVG to PDA, SVG to OM  Catheterization was done prior to this for evaluation of chest pain  He had a 70% left main stenosis, 60% LAD stenosis, 80% circumflex disease and 90% PDA stenosis  Surgery had no complications  Cardiac risk factors include advanced age (older than 54 for men, 72 for women), dyslipidemia and male gender  Past Medical History:   Diagnosis Date    Arthritis     Benign essential hypertension     Coronary artery disease     Hearing problem     Hx of CABG     Hypercholesteremia     Wears glasses     as needed    Wears hearing aid     bilateral aids     Past Surgical History:   Procedure Laterality Date    COLONOSCOPY  01/13/2021    CORONARY ARTERY BYPASS GRAFT      triple    EGD      stomach polyp    HERNIA REPAIR Bilateral     inguinal    KNEE SURGERY Right     ligament    LASIK  2000    KS XCAPSL CTRC RMVL INSJ IO LENS PROSTH W/O ECP Left 2/8/2021    Procedure: EXTRACTION EXTRACAPSULAR CATARACT PHACO INTRAOCULAR LENS (IOL);   Surgeon: Jabari Brandon MD;  Location: [facility] MAIN OR;  Service: Ophthalmology    KS XCAPSL CTRC RMVL INSJ IO LENS PROSTH W/O ECP Right 3/15/2021    Procedure: EXTRACTION EXTRACAPSULAR CATARACT PHACO INTRAOCULAR LENS (IOL); Surgeon: Jabari Brandon MD;  Location: [facility] MAIN OR;  Service: Ophthalmology     Social History     Socioeconomic History    Marital status: /Civil Union     Spouse name: Not on file    Number of children: Not on file    Years of education: Not on file    Highest education level: Not on file   Occupational History    Not on file   Tobacco Use    Smoking status: Former     Types: Cigarettes     Quit date: 1970     Years since quittin 1    Smokeless tobacco: Never   Vaping Use    Vaping Use: Never used   Substance and Sexual Activity    Alcohol use: Not Currently     Comment: quit     Drug use: Never    Sexual activity: Not on file   Other Topics Concern    Not on file   Social History Narrative    Not on file     Social Determinants of Health     Financial Resource Strain: Low Risk     Difficulty of Paying Living Expenses: Not hard at all   Food Insecurity: Not on file   Transportation Needs: No Transportation Needs    Lack of Transportation (Medical): No    Lack of Transportation (Non-Medical):  No   Physical Activity: Not on file   Stress: Not on file   Social Connections: Not on file   Intimate Partner Violence: Not on file   Housing Stability: Not on file     Family History   Problem Relation Age of Onset    Hypertension Mother     Coronary artery disease Father     Stroke Father     Aneurysm Brother         abdominal aortic aneurysm       Current Outpatient Medications:     aspirin 81 MG tablet, Take 1 tablet by mouth daily at bedtime , Disp: , Rfl:     atorvastatin (LIPITOR) 10 mg tablet, TAKE 1 TABLET BY MOUTH  DAILY, Disp: 90 tablet, Rfl: 3    Coenzyme Q10 (CoQ10) 100 MG CAPS, Take by mouth, Disp: , Rfl:     ibuprofen (MOTRIN) 200 mg tablet, Take 1 tablet by mouth 3 (three) times a day as needed, Disp: , Rfl:     losartan (COZAAR) 50 mg tablet, Take 1 tablet (50 mg total) by mouth daily, Disp: 90 tablet, Rfl: 1    tamsulosin (FLOMAX) 0 4 mg, Take 1 capsule (0 4 mg "total) by mouth daily with dinner, Disp: 90 capsule, Rfl: 0    VITAMIN D PO, Take 2,000 Units by mouth every morning , Disp: , Rfl:   The following portions of the patient's history were reviewed and updated as appropriate: allergies, current medications, past family history, past medical history, past social history, past surgical history and problem list     Review of Systems:  Review of Systems   Respiratory: Negative for shortness of breath  Cardiovascular: Negative for chest pain and leg swelling  Musculoskeletal: Positive for arthralgias  All other systems reviewed and are negative  Physical Exam:  /64 (BP Location: Left arm, Patient Position: Sitting, Cuff Size: Standard)   Pulse 68   Ht 5' 6 5\" (1 689 m)   Wt 74 4 kg (164 lb)   SpO2 98%   BMI 26 07 kg/m²   Physical Exam  Constitutional:       General: He is not in acute distress  Appearance: He is well-developed  He is not diaphoretic  HENT:      Head: Normocephalic and atraumatic  Eyes:      Conjunctiva/sclera: Conjunctivae normal       Pupils: Pupils are equal, round, and reactive to light  Neck:      Thyroid: No thyromegaly  Vascular: No JVD  Cardiovascular:      Rate and Rhythm: Normal rate and regular rhythm  Heart sounds: Normal heart sounds  No murmur heard  No friction rub  No gallop  Pulmonary:      Effort: Pulmonary effort is normal       Breath sounds: Normal breath sounds  Musculoskeletal:      Cervical back: Neck supple  Right lower leg: No edema  Left lower leg: No edema  Skin:     General: Skin is warm and dry  Findings: No erythema or rash  Neurological:      General: No focal deficit present  Mental Status: He is alert and oriented to person, place, and time  Cranial Nerves: No cranial nerve deficit  Psychiatric:         Behavior: Behavior normal          Thought Content:  Thought content normal          Judgment: Judgment normal        Cardiographics  ECG: " normal sinus rhythm, incomplete RBBB  LV Ejection Fraction: LV ejection fraction >= 40%  Imaging:No results found  Lab Review  Lab Results   Component Value Date    CHOL 160 01/14/2015    TRIG 125 11/28/2022    TRIG 92 10/05/2021    TRIG 113 04/22/2021    TRIG 100 06/01/2020    TRIG 131 01/14/2015    HDL 34 (L) 11/28/2022    HDL 32 (L) 10/05/2021    HDL 33 (L) 04/22/2021    HDL 31 (L) 06/01/2020    HDL 28 01/14/2015     Hospital Outpatient Visit on 03/31/2023   Component Date Value    Protocol Name 03/31/2023 Kadi Ariza Time In Exercise Phase 03/31/2023 00:12:00     MAX   SYSTOLIC BP 18/05/0555 750     Max Diastolic Bp 98/55/1046 80     Max Heart Rate 03/31/2023 121     Max Predicted Heart Rate 03/31/2023 139     Reason for Termination 03/31/2023 Target Heart Rate Achieved     Test Indication 03/31/2023 Abnormal Treadmill Test     Target Hr Formular 03/31/2023 (220 - Age)*100%     Chest Pain Statement 03/31/2023 none    Hospital Outpatient Visit on 03/31/2023   Component Date Value    Rest Nuclear Isotope Dose 03/31/2023 10 81     Stress Nuclear Isotope D* 03/31/2023 32 00     Baseline HR 03/31/2023 81     Baseline BP 03/31/2023 140/80     O2 sat rest 03/31/2023 98     Stress peak HR 03/31/2023 118     Post peak BP 03/31/2023 204     Rate Pressure Product 03/31/2023 24,072 0     O2 sat peak 03/31/2023 98     Recovery HR 03/31/2023 87     Recovery BP 03/31/2023 140/80     O2 sat recovery 03/31/2023 98     Max HR 03/31/2023 121     Max HR Percent 03/31/2023 87     Exercise duration (min) 03/31/2023 12     Estimated workload 03/31/2023 13 4     Angina Index 03/31/2023 0     Stress Stage Reached 03/31/2023 4 0     EF (%) 03/31/2023 66    Hospital Outpatient Visit on 03/23/2023   Component Date Value    Baseline HR 03/23/2023 82     Baseline BP 03/23/2023 140/80     O2 sat rest 03/23/2023 98     Stress peak HR 03/23/2023 120     Post peak BP 03/23/2023 190     Rate Pressure Product 03/23/2023 22,800 0     O2 sat peak 03/23/2023 98     Recovery HR 03/23/2023 87     Recovery BP 03/23/2023 120/76     O2 sat recovery 03/23/2023 98     Max HR 03/23/2023 122     Max HR Percent 03/23/2023 87     Exercise duration (min) 03/23/2023 11     Exercise duration (sec) 03/23/2023 22     Estimated workload 03/23/2023 13 4     Angina Index 03/23/2023 1     Protocol Name 03/23/2023 OLENA     Time In Exercise Phase 03/23/2023 00:11:22     MAX  SYSTOLIC BP 93/98/3995 046     Max Diastolic Bp 45/74/8971 64     Max Heart Rate 03/23/2023 122     Max Predicted Heart Rate 03/23/2023 139     Reason for Termination 03/23/2023 Target Heart Rate Achieved     Test Indication 03/23/2023 labile BP     Target Hr Formular 03/23/2023 (220 - Age)*100%     Chest Pain Statement 03/23/2023 none      Assessment/Plan     1  CAD, multiple vessel    2  Essential hypertension    3  S/P CABG x 4    4  Mixed hyperlipidemia    5  MTHFR mutation      -  BP better controlled at home and during checks at fitness center  Encouraged to keep exercising but not to overburden himself at gym again   - Blood work ordered including CMP and CBC    - Continue  Losartan 50 mg daily     - Continue atorvastatin

## 2023-05-04 ENCOUNTER — APPOINTMENT (OUTPATIENT)
Dept: LAB | Facility: CLINIC | Age: 83
End: 2023-05-04

## 2023-05-04 LAB
ALBUMIN SERPL BCP-MCNC: 3.9 G/DL (ref 3.5–5)
ALP SERPL-CCNC: 60 U/L (ref 46–116)
ALT SERPL W P-5'-P-CCNC: 22 U/L (ref 12–78)
ANION GAP SERPL CALCULATED.3IONS-SCNC: 6 MMOL/L (ref 4–13)
AST SERPL W P-5'-P-CCNC: 17 U/L (ref 5–45)
BILIRUB SERPL-MCNC: 0.67 MG/DL (ref 0.2–1)
BUN SERPL-MCNC: 20 MG/DL (ref 5–25)
CALCIUM SERPL-MCNC: 9 MG/DL (ref 8.3–10.1)
CHLORIDE SERPL-SCNC: 105 MMOL/L (ref 96–108)
CHOLEST SERPL-MCNC: 120 MG/DL
CO2 SERPL-SCNC: 27 MMOL/L (ref 21–32)
CREAT SERPL-MCNC: 1.22 MG/DL (ref 0.6–1.3)
ERYTHROCYTE [DISTWIDTH] IN BLOOD BY AUTOMATED COUNT: 12.5 % (ref 11.6–15.1)
GFR SERPL CREATININE-BSD FRML MDRD: 55 ML/MIN/1.73SQ M
GLUCOSE P FAST SERPL-MCNC: 104 MG/DL (ref 65–99)
HCT VFR BLD AUTO: 45.5 % (ref 36.5–49.3)
HDLC SERPL-MCNC: 35 MG/DL
HGB BLD-MCNC: 15 G/DL (ref 12–17)
LDLC SERPL CALC-MCNC: 57 MG/DL (ref 0–100)
MCH RBC QN AUTO: 29.1 PG (ref 26.8–34.3)
MCHC RBC AUTO-ENTMCNC: 33 G/DL (ref 31.4–37.4)
MCV RBC AUTO: 88 FL (ref 82–98)
NONHDLC SERPL-MCNC: 85 MG/DL
PLATELET # BLD AUTO: 281 THOUSANDS/UL (ref 149–390)
PMV BLD AUTO: 12 FL (ref 8.9–12.7)
POTASSIUM SERPL-SCNC: 4 MMOL/L (ref 3.5–5.3)
PROT SERPL-MCNC: 7.5 G/DL (ref 6.4–8.4)
RBC # BLD AUTO: 5.16 MILLION/UL (ref 3.88–5.62)
SODIUM SERPL-SCNC: 138 MMOL/L (ref 135–147)
TRIGL SERPL-MCNC: 139 MG/DL
WBC # BLD AUTO: 6.87 THOUSAND/UL (ref 4.31–10.16)

## 2023-05-22 DIAGNOSIS — R35.0 URINE FREQUENCY: ICD-10-CM

## 2023-05-22 RX ORDER — TAMSULOSIN HYDROCHLORIDE 0.4 MG/1
0.4 CAPSULE ORAL
Qty: 90 CAPSULE | Refills: 3 | Status: SHIPPED | OUTPATIENT
Start: 2023-05-22

## 2023-06-26 ENCOUNTER — OFFICE VISIT (OUTPATIENT)
Dept: FAMILY MEDICINE CLINIC | Facility: CLINIC | Age: 83
End: 2023-06-26
Payer: COMMERCIAL

## 2023-06-26 VITALS
OXYGEN SATURATION: 99 % | TEMPERATURE: 98.4 F | SYSTOLIC BLOOD PRESSURE: 150 MMHG | DIASTOLIC BLOOD PRESSURE: 80 MMHG | WEIGHT: 162 LBS | BODY MASS INDEX: 25.43 KG/M2 | HEART RATE: 66 BPM | HEIGHT: 67 IN | RESPIRATION RATE: 16 BRPM

## 2023-06-26 DIAGNOSIS — I10 ESSENTIAL HYPERTENSION: Primary | ICD-10-CM

## 2023-06-26 DIAGNOSIS — I25.119 CORONARY ARTERY DISEASE INVOLVING NATIVE CORONARY ARTERY OF NATIVE HEART WITH ANGINA PECTORIS (HCC): ICD-10-CM

## 2023-06-26 DIAGNOSIS — E78.2 MIXED HYPERLIPIDEMIA: ICD-10-CM

## 2023-06-26 DIAGNOSIS — F33.9 DEPRESSION, RECURRENT (HCC): ICD-10-CM

## 2023-06-26 PROBLEM — N40.0 BENIGN PROSTATIC HYPERPLASIA WITHOUT LOWER URINARY TRACT SYMPTOMS: Status: ACTIVE | Noted: 2023-06-26

## 2023-06-26 PROCEDURE — 99214 OFFICE O/P EST MOD 30 MIN: CPT | Performed by: FAMILY MEDICINE

## 2023-06-26 NOTE — PROGRESS NOTES
Ramin Ogden 1940 male MRN: [id number]    FAMILY PRACTICE OFFICE VISIT  [facility] - [address]      ASSESSMENT/PLAN  Ramin Ogden is a 81 y o  male presents to the office for    Diagnoses and all orders for this visit:    Essential hypertension    Coronary artery disease involving native coronary artery of native heart with angina pectoris ([address] )    Mixed hyperlipidemia    Depression, recurrent ([facility] [address] )       OK with 3 times a week  for Liquid IV; Given sodium can affect your BPs  Currently from a blood pressure standpoint you are very stable today  Patient is to continue seeing cardiology every 6 months given history of coronary artery disease  Hyperlipidemia continue on atorvastatin 10 mg currently stable  No changes to be made at this time  Continues to be an amazing  and taking care of his wife who is sick with Parkinson's but I did educate the patient that he needs to take care of himself as well because I worry about his heart condition and the stressors from home  Return to the office in 6 months  Future Appointments   Date Time Provider Armen Polanco   8[facility]3/2023 10:00  [facility]   8/3/2023 10:45 AM Sabino Para [place] [facility]   12/4/2023 10:45 AM Yolanda Reyes MD [facility]   12/18/2023 11:00 AM Dmitriy Coates MD [facility]          SUBJECTIVE  CC: Follow-up      HPI:  Ramin Ogden is a 81 y o  male who presents for 6-month follow-up  Patient states at this time his blood pressure has been very well controlled  He stopped checking them after seeing the cardiologist   Patient states he had 1 vasovagal at the gym when he worked out too hard and passed out  Patient has been taking IV liquid daily ever since  Patient has not had any other events  He states that he did push himself a little more than his normal that day    Patient is taking his cholesterol medication and his blood pressure medications without any issues  Just recently saw his cardiologist in May  Patient's depression continues to be stable unfortunately he is the sole caretaker of his wife who has Parkinson's  Review of Systems   Constitutional: Positive for fatigue  Negative for activity change, appetite change, chills and fever  HENT: Negative for congestion  Respiratory: Negative for cough, chest tightness and shortness of breath  Cardiovascular: Negative for chest pain and leg swelling  Gastrointestinal: Negative for abdominal distention, abdominal pain, constipation, diarrhea, nausea and vomiting  All other systems reviewed and are negative        Historical Information   The patient history was reviewed as follows:  Past Medical History:   Diagnosis Date   • Allergic    • Arthritis    • Benign essential hypertension    • Coronary artery disease    • Hearing problem    • HL (hearing loss)    • Hx of CABG    • Hypercholesteremia    • Hypertension    • Wears glasses     as needed   • Wears hearing aid     bilateral aids         Medications:     Current Outpatient Medications:   •  aspirin 81 MG tablet, Take 1 tablet by mouth daily at bedtime , Disp: , Rfl:   •  atorvastatin (LIPITOR) 10 mg tablet, TAKE 1 TABLET BY MOUTH  DAILY, Disp: 90 tablet, Rfl: 3  •  Coenzyme Q10 (CoQ10) 100 MG CAPS, Take by mouth, Disp: , Rfl:   •  ibuprofen (MOTRIN) 200 mg tablet, Take 1 tablet by mouth 3 (three) times a day as needed, Disp: , Rfl:   •  losartan (COZAAR) 50 mg tablet, Take 1 tablet (50 mg total) by mouth daily, Disp: 90 tablet, Rfl: 1  •  tamsulosin (FLOMAX) 0 4 mg, Take 1 capsule (0 4 mg total) by mouth daily with dinner, Disp: 90 capsule, Rfl: 3  •  VITAMIN D PO, Take 2,000 Units by mouth every morning , Disp: , Rfl:     Allergies   Allergen Reactions   • Acetaminophen Throat Swelling   • Amoxicillin Rash   • Lisinopril Cough       OBJECTIVE  Vitals:   Vitals:    06/26/23 1033   BP: 150/80   BP Location: Left arm "  Patient Position: Sitting   Cuff Size: Standard   Pulse: 66   Resp: 16   Temp: 98 4 °F (36 9 °C)   SpO2: 99%   Weight: 73 5 kg (162 lb)   Height: 5' 6 5\" (1 689 m)         Physical Exam  Vitals reviewed  Constitutional:       Appearance: He is well-developed  HENT:      Head: Normocephalic and atraumatic  Eyes:      Conjunctiva/sclera: Conjunctivae normal       Pupils: Pupils are equal, round, and reactive to light  Cardiovascular:      Rate and Rhythm: Normal rate and regular rhythm  Heart sounds: Normal heart sounds  Pulmonary:      Effort: Pulmonary effort is normal  No respiratory distress  Breath sounds: Normal breath sounds  Musculoskeletal:         General: Normal range of motion  Cervical back: Normal range of motion and neck supple  Skin:     General: Skin is warm  Capillary Refill: Capillary refill takes less than 2 seconds  Neurological:      Mental Status: He is alert and oriented to person, place, and time                      Mirna Randall MD,   [facility]  6/26/2023      "

## 2023-08-03 ENCOUNTER — OFFICE VISIT (OUTPATIENT)
Dept: AUDIOLOGY | Facility: CLINIC | Age: 83
End: 2023-08-03
Payer: COMMERCIAL

## 2023-08-03 DIAGNOSIS — H90.3 SENSORY HEARING LOSS, BILATERAL: Primary | ICD-10-CM

## 2023-08-03 PROCEDURE — 92557 COMPREHENSIVE HEARING TEST: CPT | Performed by: AUDIOLOGIST

## 2023-08-03 PROCEDURE — 92567 TYMPANOMETRY: CPT | Performed by: AUDIOLOGIST

## 2023-08-06 NOTE — PROGRESS NOTES
HEARING EVALUATION    Name:  Simeon Groves  :  1940  Age:  81 y.o. Date of Evaluation:  8/3/23    History:  Known bilateral hearing loss, right greater than left, with use of binaural hearing aids fit in 2017  Reason for visit: Simeon Groves is being seen today at the request of Dr. Dori Quinteros for an evaluation of hearing. Patient reports no current, significant changes in medical status from last evaluation. EVALUATION:    Otoscopic Evaluation:   Right Ear: clear canal   Left Ear: clear canal     Tympanometry:   Right: Type Ad - hypermobile compliance   Left: Type C - negative pressure  - 154  (discussed)    Audiogram Results:  Mild / moderate to severe SHL bilaterally, right slightly greater than left ear. Patient reports more "distortion" from the right ear, with speech stimuli. *see attached audiogram    Thresholds are stable to those obtained on 22. RECOMMENDATIONS:  1. Annual hearing evaluations for monitoring purposes     PATIENT EDUCATION:   Discussed results and recommendations with Mr. Nathalie Shaw. Questions were addressed and the patient was encouraged to contact our department should concerns arise.     Janes Fall, [facility], [place]# [id number]  Clinical Audiologist

## 2023-08-06 NOTE — PROGRESS NOTES
Hearing Aid Visit:    Name:  Jimena Ghosh  :  1940  Age:  81 y.o. Date of Evaluation:  8/3/23    Jimena Ghosh is being seen for a hearing aid visit in conjunction with an annual hearing evaluation. Jimena Ghosh is fit with OtCirqle OPN 3 minirite T hearing aid(s). Right serial number [id number]. Left serial number [id number]. Warranty date: OOW 2020 (Loss/Damage and repair). Patient reports daily use of the devices. Action:  1. Cleaned and checked both devices. Found to be in good working order via listening check. Recommendations:   1. Patient has chosen to contact us when needed for hearing aid servicing.   2. An annual appointment was scheduled for 24     Janes Pierre, [facility], [place]# [id number]  Clinical Audiologist

## 2023-08-07 ENCOUNTER — TELEPHONE (OUTPATIENT)
Dept: FAMILY MEDICINE CLINIC | Facility: CLINIC | Age: 83
End: 2023-08-07

## 2023-08-07 NOTE — TELEPHONE ENCOUNTER
Dr. Lauren Co:    Patient called and would like a c/b from you. He stated that in his MyChart one of the diagnosis listed is Stage III Kidney Disease? He said he would like to know if this is his diagnosis what he should be doing to improve this condition? He said nobody ever diagnosed him with this and just wanted more clarification. Please c/b to discuss further. See message below:    Mariana Mendoza, [phone], [facility]. I'd like to have Ratna call me back when she gets a chance in regards to a kidney. Please have her call me. Thank you.

## 2023-08-07 NOTE — TELEPHONE ENCOUNTER
Spoke to patient about CKD stage A3; He understands now. Recommendation Low salt. He rarely uses Advil, doesn't not do ETOH.  Repeat every 6 months like we have been doing

## 2023-09-07 DIAGNOSIS — E78.5 HYPERLIPIDEMIA, UNSPECIFIED HYPERLIPIDEMIA TYPE: ICD-10-CM

## 2023-09-07 RX ORDER — ATORVASTATIN CALCIUM 10 MG/1
TABLET, FILM COATED ORAL
Qty: 90 TABLET | Refills: 3 | Status: SHIPPED | OUTPATIENT
Start: 2023-09-07

## 2023-09-17 DIAGNOSIS — I10 ESSENTIAL HYPERTENSION: ICD-10-CM

## 2023-09-20 RX ORDER — LOSARTAN POTASSIUM 50 MG/1
50 TABLET ORAL DAILY
Qty: 90 TABLET | Refills: 3 | Status: SHIPPED | OUTPATIENT
Start: 2023-09-20

## 2023-10-13 ENCOUNTER — CLINICAL SUPPORT (OUTPATIENT)
Dept: FAMILY MEDICINE CLINIC | Facility: CLINIC | Age: 83
End: 2023-10-13
Payer: COMMERCIAL

## 2023-10-13 ENCOUNTER — OFFICE VISIT (OUTPATIENT)
Dept: CARDIOLOGY CLINIC | Facility: CLINIC | Age: 83
End: 2023-10-13
Payer: COMMERCIAL

## 2023-10-13 VITALS
WEIGHT: 160 LBS | BODY MASS INDEX: 25.11 KG/M2 | SYSTOLIC BLOOD PRESSURE: 146 MMHG | HEIGHT: 67 IN | OXYGEN SATURATION: 99 % | DIASTOLIC BLOOD PRESSURE: 80 MMHG | HEART RATE: 71 BPM

## 2023-10-13 DIAGNOSIS — I25.10 CAD, MULTIPLE VESSEL: ICD-10-CM

## 2023-10-13 DIAGNOSIS — Z23 ENCOUNTER FOR IMMUNIZATION: Primary | ICD-10-CM

## 2023-10-13 DIAGNOSIS — Z95.1 S/P CABG X 4: ICD-10-CM

## 2023-10-13 DIAGNOSIS — I10 ESSENTIAL HYPERTENSION: Primary | ICD-10-CM

## 2023-10-13 DIAGNOSIS — Z15.89 MTHFR MUTATION: ICD-10-CM

## 2023-10-13 DIAGNOSIS — E78.2 MIXED HYPERLIPIDEMIA: ICD-10-CM

## 2023-10-13 DIAGNOSIS — E78.5 HYPERLIPIDEMIA, UNSPECIFIED HYPERLIPIDEMIA TYPE: ICD-10-CM

## 2023-10-13 PROCEDURE — G0008 ADMIN INFLUENZA VIRUS VAC: HCPCS

## 2023-10-13 PROCEDURE — 90662 IIV NO PRSV INCREASED AG IM: CPT

## 2023-10-13 PROCEDURE — 99214 OFFICE O/P EST MOD 30 MIN: CPT | Performed by: INTERNAL MEDICINE

## 2023-10-13 NOTE — PROGRESS NOTES
Noy Hung  1940  [id number]      Interval History:  Noy Hung is a 81 y.o. male who presents for follow up of coronary artery disease. Since his last visit, he has been feeling well.  he denies any palpitations, chest pain, shortness of breath, LE edema, orthopnea or PND. Diet is overall unchanged. He exercises with a treadmill 6 days a week/40 minutes a day. His last blood work was normal.  His last LDL cholesterol was 57 mg/dL. HDL was low at 35 mg/dL    Previously, he underwent coronary artery bypass grafting on January 15, 2015. He had LIMA to LAD, SVG to PDA, SVG to OM. Catheterization was done prior to this for evaluation of chest pain. He had a 70% left main stenosis, 60% LAD stenosis, 80% circumflex disease and 90% PDA stenosis. Surgery had no complications. Cardiac risk factors include advanced age (older than 54 for men, 72 for women), dyslipidemia and male gender. Past Medical History:   Diagnosis Date    Allergic     Arthritis     Benign essential hypertension     Coronary artery disease     Hearing problem     HL (hearing loss)     Hx of CABG     Hypercholesteremia     Hypertension     Wears glasses     as needed    Wears hearing aid     bilateral aids     Past Surgical History:   Procedure Laterality Date    COLONOSCOPY  01/13/2021    CORONARY ARTERY BYPASS GRAFT      triple    EGD      stomach polyp    EYE SURGERY  3/2021    HERNIA REPAIR Bilateral     inguinal    KNEE SURGERY Right     ligament    LASIK  2000    SD XCAPSL CTRC RMVL INSJ IO LENS PROSTH W/O ECP Left 02/08/2021    Procedure: EXTRACTION EXTRACAPSULAR CATARACT PHACO INTRAOCULAR LENS (IOL); Surgeon: Chetna Doherty MD;  Location: [facility] OR;  Service: Ophthalmology    SD XCAPSL CTRC RMVL INSJ IO LENS PROSTH W/O ECP Right 03/15/2021    Procedure: EXTRACTION EXTRACAPSULAR CATARACT PHACO INTRAOCULAR LENS (IOL);   Surgeon: Chetna Doherty MD;  Location: [facility] OR;  Service: Ophthalmology Social History     Socioeconomic History    Marital status: /Civil Union     Spouse name: Not on file    Number of children: Not on file    Years of education: Not on file    Highest education level: Not on file   Occupational History    Not on file   Tobacco Use    Smoking status: Former     Packs/day: 1.00     Years: 5.00     Total pack years: 5.00     Types: Cigarettes     Quit date: 1970     Years since quittin.5    Smokeless tobacco: Never   Vaping Use    Vaping Use: Never used   Substance and Sexual Activity    Alcohol use: Not Currently     Comment: quit     Drug use: Never    Sexual activity: Not on file   Other Topics Concern    Not on file   Social History Narrative    Not on file     Social Determinants of Health     Financial Resource Strain: Low Risk  (2022)    Overall Financial Resource Strain (CARDIA)     Difficulty of Paying Living Expenses: Not hard at all   Food Insecurity: Not on file   Transportation Needs: No Transportation Needs (2022)    PRAPARE - Transportation     Lack of Transportation (Medical): No     Lack of Transportation (Non-Medical):  No   Physical Activity: Not on file   Stress: Not on file   Social Connections: Not on file   Intimate Partner Violence: Not on file   Housing Stability: Not on file     Family History   Problem Relation Age of Onset    Hypertension Mother     Dementia Mother     Coronary artery disease Father     Stroke Father     Heart disease Father     Hearing loss Father     Aneurysm Brother         abdominal aortic aneurysm       Current Outpatient Medications:     aspirin 81 MG tablet, Take 1 tablet by mouth daily at bedtime , Disp: , Rfl:     atorvastatin (LIPITOR) 10 mg tablet, TAKE 1 TABLET BY MOUTH  DAILY, Disp: 90 tablet, Rfl: 3    Coenzyme Q10 (CoQ10) 100 MG CAPS, Take by mouth, Disp: , Rfl:     ibuprofen (MOTRIN) 200 mg tablet, Take 1 tablet by mouth 3 (three) times a day as needed, Disp: , Rfl:     losartan (COZAAR) 50 mg tablet, TAKE 1 TABLET BY MOUTH DAILY, Disp: 90 tablet, Rfl: 3    tamsulosin (FLOMAX) 0.4 mg, Take 1 capsule (0.4 mg total) by mouth daily with dinner, Disp: 90 capsule, Rfl: 3    VITAMIN D PO, Take 2,000 Units by mouth every morning , Disp: , Rfl:   The following portions of the patient's history were reviewed and updated as appropriate: allergies, current medications, past family history, past medical history, past social history, past surgical history and problem list..    Review of Systems:  Review of Systems   Respiratory:  Negative for shortness of breath. Cardiovascular:  Negative for chest pain, palpitations and leg swelling. Musculoskeletal:  Positive for arthralgias. All other systems reviewed and are negative. Physical Exam:  /80 (BP Location: Left arm, Patient Position: Sitting, Cuff Size: Standard)   Pulse 71   Ht 5' 6.5" (1.689 m)   Wt 72.6 kg (160 lb)   SpO2 99%   BMI 25.44 kg/m²   Physical Exam  Constitutional:       General: He is not in acute distress. Appearance: Normal appearance. He is well-developed. He is not diaphoretic. HENT:      Head: Normocephalic and atraumatic. Eyes:      General: No scleral icterus. Conjunctiva/sclera: Conjunctivae normal.      Pupils: Pupils are equal, round, and reactive to light. Neck:      Thyroid: No thyromegaly. Vascular: No JVD. Cardiovascular:      Rate and Rhythm: Normal rate and regular rhythm. Heart sounds: Normal heart sounds. No murmur heard. No friction rub. No gallop. Pulmonary:      Effort: Pulmonary effort is normal.      Breath sounds: Normal breath sounds. Musculoskeletal:      Cervical back: Neck supple. Skin:     General: Skin is warm and dry. Findings: No erythema or rash. Neurological:      General: No focal deficit present. Mental Status: He is alert and oriented to person, place, and time. Mental status is at baseline.    Psychiatric:         Mood and Affect: Mood normal. Behavior: Behavior normal.         Thought Content: Thought content normal.         Judgment: Judgment normal.       Cardiographics  ECG: normal sinus rhythm, incomplete RBBB . LV Ejection Fraction: LV ejection fraction >= 40%. Imaging:No results found. Lab Review  Lab Results   Component Value Date    CHOL 160 01/14/2015    TRIG 139 05/04/2023    TRIG 125 11/28/2022    TRIG 92 10/05/2021    TRIG 100 06/01/2020    TRIG 131 01/14/2015    HDL 35 (L) 05/04/2023    HDL 34 (L) 11/28/2022    HDL 32 (L) 10/05/2021    HDL 31 (L) 06/01/2020    HDL 28 01/14/2015     No visits with results within 2 Month(s) from this visit. Latest known visit with results is:   Office Visit on 05/03/2023   Component Date Value    Sodium 05/04/2023 138     Potassium 05/04/2023 4.0     Chloride 05/04/2023 105     CO2 05/04/2023 27     ANION GAP 05/04/2023 6     BUN 05/04/2023 20     Creatinine 05/04/2023 1.22     Glucose, Fasting 05/04/2023 104 (H)     Calcium 05/04/2023 9.0     AST 05/04/2023 17     ALT 05/04/2023 22     Alkaline Phosphatase 05/04/2023 60     Total Protein 05/04/2023 7.5     Albumin 05/04/2023 3.9     Total Bilirubin 05/04/2023 0.67     eGFR 05/04/2023 55     Cholesterol 05/04/2023 120     Triglycerides 05/04/2023 139     HDL, Direct 05/04/2023 35 (L)     LDL Calculated 05/04/2023 57     Non-HDL-Chol (CHOL-HDL) 05/04/2023 85     WBC 05/04/2023 6.87     RBC 05/04/2023 5.16     Hemoglobin 05/04/2023 15.0     Hematocrit 05/04/2023 45.5     MCV 05/04/2023 88     MCH 05/04/2023 29.1     MCHC 05/04/2023 33.0     RDW 05/04/2023 12.5     Platelets 70/81/9049 281     MPV 05/04/2023 12.0      Assessment/Plan     1. Essential hypertension    2. Hyperlipidemia, unspecified hyperlipidemia type    3. CAD, multiple vessel    4. S/P CABG x 4    5. Mixed hyperlipidemia    6. MTHFR mutation      -  Patient feeling well. Exercising regularly. - Last blood work was good.   Lipid levels at goal.  Has blood work ordered by  Tai.    - Continue  Losartan 50 mg daily.    - Continue atorvastatin

## 2023-11-08 DIAGNOSIS — E78.2 MIXED HYPERLIPIDEMIA: ICD-10-CM

## 2023-11-08 DIAGNOSIS — I10 ESSENTIAL HYPERTENSION: Primary | ICD-10-CM

## 2023-11-08 DIAGNOSIS — Z12.5 SCREENING PSA (PROSTATE SPECIFIC ANTIGEN): ICD-10-CM

## 2023-11-21 ENCOUNTER — APPOINTMENT (OUTPATIENT)
Dept: LAB | Facility: CLINIC | Age: 83
End: 2023-11-21
Payer: COMMERCIAL

## 2023-11-21 DIAGNOSIS — Z12.5 SCREENING PSA (PROSTATE SPECIFIC ANTIGEN): ICD-10-CM

## 2023-11-21 DIAGNOSIS — I10 ESSENTIAL HYPERTENSION: ICD-10-CM

## 2023-11-21 DIAGNOSIS — E78.2 MIXED HYPERLIPIDEMIA: ICD-10-CM

## 2023-11-21 DIAGNOSIS — E83.118 OTHER HEMOCHROMATOSIS: ICD-10-CM

## 2023-11-21 LAB
ALBUMIN SERPL BCP-MCNC: 4.1 G/DL (ref 3.5–5)
ALP SERPL-CCNC: 45 U/L (ref 34–104)
ALT SERPL W P-5'-P-CCNC: 15 U/L (ref 7–52)
ANION GAP SERPL CALCULATED.3IONS-SCNC: 8 MMOL/L
AST SERPL W P-5'-P-CCNC: 18 U/L (ref 13–39)
BASOPHILS # BLD AUTO: 0.09 THOUSANDS/ÂΜL (ref 0–0.1)
BASOPHILS NFR BLD AUTO: 1 % (ref 0–1)
BILIRUB SERPL-MCNC: 0.75 MG/DL (ref 0.2–1)
BUN SERPL-MCNC: 18 MG/DL (ref 5–25)
CALCIUM SERPL-MCNC: 9 MG/DL (ref 8.4–10.2)
CHLORIDE SERPL-SCNC: 104 MMOL/L (ref 96–108)
CHOLEST SERPL-MCNC: 109 MG/DL
CO2 SERPL-SCNC: 28 MMOL/L (ref 21–32)
CREAT SERPL-MCNC: 1.06 MG/DL (ref 0.6–1.3)
EOSINOPHIL # BLD AUTO: 0.18 THOUSAND/ÂΜL (ref 0–0.61)
EOSINOPHIL NFR BLD AUTO: 3 % (ref 0–6)
ERYTHROCYTE [DISTWIDTH] IN BLOOD BY AUTOMATED COUNT: 12.5 % (ref 11.6–15.1)
FERRITIN SERPL-MCNC: 56 NG/ML (ref 24–336)
GFR SERPL CREATININE-BSD FRML MDRD: 65 ML/MIN/1.73SQ M
GLUCOSE P FAST SERPL-MCNC: 108 MG/DL (ref 65–99)
HCT VFR BLD AUTO: 43.1 % (ref 36.5–49.3)
HDLC SERPL-MCNC: 31 MG/DL
HGB BLD-MCNC: 14.4 G/DL (ref 12–17)
IMM GRANULOCYTES # BLD AUTO: 0.02 THOUSAND/UL (ref 0–0.2)
IMM GRANULOCYTES NFR BLD AUTO: 0 % (ref 0–2)
IRON SATN MFR SERPL: 33 % (ref 15–50)
IRON SERPL-MCNC: 100 UG/DL (ref 50–212)
LDLC SERPL CALC-MCNC: 60 MG/DL (ref 0–100)
LYMPHOCYTES # BLD AUTO: 1.41 THOUSANDS/ÂΜL (ref 0.6–4.47)
LYMPHOCYTES NFR BLD AUTO: 22 % (ref 14–44)
MCH RBC QN AUTO: 28.9 PG (ref 26.8–34.3)
MCHC RBC AUTO-ENTMCNC: 33.4 G/DL (ref 31.4–37.4)
MCV RBC AUTO: 86 FL (ref 82–98)
MONOCYTES # BLD AUTO: 0.53 THOUSAND/ÂΜL (ref 0.17–1.22)
MONOCYTES NFR BLD AUTO: 8 % (ref 4–12)
NEUTROPHILS # BLD AUTO: 4.24 THOUSANDS/ÂΜL (ref 1.85–7.62)
NEUTS SEG NFR BLD AUTO: 66 % (ref 43–75)
NONHDLC SERPL-MCNC: 78 MG/DL
NRBC BLD AUTO-RTO: 0 /100 WBCS
PLATELET # BLD AUTO: 263 THOUSANDS/UL (ref 149–390)
PMV BLD AUTO: 11.4 FL (ref 8.9–12.7)
POTASSIUM SERPL-SCNC: 4 MMOL/L (ref 3.5–5.3)
PROT SERPL-MCNC: 7 G/DL (ref 6.4–8.4)
PSA SERPL-MCNC: 1.06 NG/ML (ref 0–4)
RBC # BLD AUTO: 4.99 MILLION/UL (ref 3.88–5.62)
SODIUM SERPL-SCNC: 140 MMOL/L (ref 135–147)
TIBC SERPL-MCNC: 300 UG/DL (ref 250–450)
TRIGL SERPL-MCNC: 92 MG/DL
UIBC SERPL-MCNC: 200 UG/DL (ref 155–355)
WBC # BLD AUTO: 6.47 THOUSAND/UL (ref 4.31–10.16)

## 2023-11-21 PROCEDURE — 83540 ASSAY OF IRON: CPT

## 2023-11-21 PROCEDURE — G0103 PSA SCREENING: HCPCS

## 2023-11-21 PROCEDURE — 36415 COLL VENOUS BLD VENIPUNCTURE: CPT

## 2023-11-21 PROCEDURE — 85025 COMPLETE CBC W/AUTO DIFF WBC: CPT

## 2023-11-21 PROCEDURE — 83550 IRON BINDING TEST: CPT

## 2023-11-21 PROCEDURE — 80053 COMPREHEN METABOLIC PANEL: CPT

## 2023-11-21 PROCEDURE — 82728 ASSAY OF FERRITIN: CPT

## 2023-11-22 ENCOUNTER — TELEPHONE (OUTPATIENT)
Dept: CARDIOLOGY CLINIC | Facility: CLINIC | Age: 83
End: 2023-11-22

## 2023-11-22 NOTE — TELEPHONE ENCOUNTER
----- Message from Cindy Wu DO sent at 11/22/2023  3:45 PM EST -----  Can you please let the patient know blood work was normal

## 2023-11-26 ENCOUNTER — RA CDI HCC (OUTPATIENT)
Dept: OTHER | Facility: HOSPITAL | Age: 83
End: 2023-11-26

## 2023-11-26 NOTE — PROGRESS NOTES
[facility] coding opportunities       Chart reviewed, no opportunity found: [id number]        Patients Insurance     Medicare Insurance: [facility]

## 2023-12-04 ENCOUNTER — OFFICE VISIT (OUTPATIENT)
Dept: FAMILY MEDICINE CLINIC | Facility: CLINIC | Age: 83
End: 2023-12-04
Payer: COMMERCIAL

## 2023-12-04 VITALS
HEIGHT: 67 IN | BODY MASS INDEX: 24.8 KG/M2 | OXYGEN SATURATION: 99 % | TEMPERATURE: 96.1 F | HEART RATE: 72 BPM | WEIGHT: 158 LBS | RESPIRATION RATE: 16 BRPM | SYSTOLIC BLOOD PRESSURE: 130 MMHG | DIASTOLIC BLOOD PRESSURE: 62 MMHG

## 2023-12-04 DIAGNOSIS — R35.0 URINE FREQUENCY: ICD-10-CM

## 2023-12-04 DIAGNOSIS — N18.2 CKD (CHRONIC KIDNEY DISEASE) STAGE 2, GFR 60-89 ML/MIN: ICD-10-CM

## 2023-12-04 DIAGNOSIS — Z00.00 MEDICARE ANNUAL WELLNESS VISIT, SUBSEQUENT: Primary | ICD-10-CM

## 2023-12-04 DIAGNOSIS — E72.12 METHYLENETETRAHYDROFOLATE REDUCTASE DEFICIENCY (HCC): ICD-10-CM

## 2023-12-04 PROCEDURE — G0439 PPPS, SUBSEQ VISIT: HCPCS | Performed by: FAMILY MEDICINE

## 2023-12-04 NOTE — PROGRESS NOTES
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 The BMI is above normal. Exercise recommendations include exercising 3-5 times per week. Rationale for BMI follow-up plan is due to patient being overweight or obese. Preventive health issues were discussed with patient, and age appropriate screening tests were ordered as noted in patient's After Visit Summary. Personalized health advice and appropriate referrals for health education or preventive services given if needed, as noted in patient's After Visit Summary. History of Present Illness:     Patient presents for a Medicare Wellness Visit  Patient continues to be working out daily. Patient states that he is caring for his wife day and night. Does not sleep well but does not want any aid of medications given that he is worried he will wake up to hear her. Patient scheduled for seeing all his specialists  HPI   Patient Care Team:  Sree Neumann MD as PCP - General (Family Medicine)  Genetta Klinefelter, MD Othella Piccolo, MD Vonda Peals, MD Naomie Graft,      Review of Systems:     Review of Systems   Constitutional:  Negative for activity change, appetite change, chills, fatigue and fever. HENT:  Negative for congestion. Respiratory:  Negative for cough, chest tightness and shortness of breath. Cardiovascular:  Negative for chest pain and leg swelling. Gastrointestinal:  Positive for constipation (sometiems). Negative for abdominal distention, abdominal pain, diarrhea, nausea and vomiting. Genitourinary:  Positive for frequency. All other systems reviewed and are negative. Problem List:     Patient Active Problem List   Diagnosis   • CAD, multiple vessel   • S/P CABG x 4   • Hyperlipidemia   • Methylenetetrahydrofolate reductase deficiency (HCC)   • Other hemochromatosis   • Coronary artery disease involving native coronary artery of native heart with angina pectoris ([address])   • Stage 3a chronic kidney disease ([address])   • Benign prostatic hyperplasia without lower urinary tract symptoms   • Depression, recurrent ([address])      Past Medical and Surgical History:     Past Medical History:   Diagnosis Date   • Allergic    • Arthritis    • Benign essential hypertension    • Coronary artery disease    • Hearing problem    • HL (hearing loss)    • Hx of CABG    • Hypercholesteremia    • Hypertension    • Wears glasses     as needed   • Wears hearing aid     bilateral aids     Past Surgical History:   Procedure Laterality Date   • COLONOSCOPY  01/13/2021   • CORONARY ARTERY BYPASS GRAFT      triple   • EGD      stomach polyp   • EYE SURGERY  3/2021   • HERNIA REPAIR Bilateral     inguinal   • KNEE SURGERY Right     ligament   • LASIK  2000   • NC XCAPSL CTRC RMVL INSJ IO LENS PROSTH W/O ECP Left 02/08/2021    Procedure: EXTRACTION EXTRACAPSULAR CATARACT PHACO INTRAOCULAR LENS (IOL); Surgeon: Andre Palomo MD;  Location: [facility] MAIN OR;  Service: Ophthalmology   • NC XCAPSL CTRC RMVL INSJ IO LENS PROSTH W/O ECP Right 03/15/2021    Procedure: EXTRACTION EXTRACAPSULAR CATARACT PHACO INTRAOCULAR LENS (IOL);   Surgeon: Andre Palomo MD;  Location: [facility] OR;  Service: Ophthalmology      Family History:     Family History   Problem Relation Age of Onset   • Hypertension Mother    • Dementia Mother    • Coronary artery disease Father    • Stroke Father    • Heart disease Father    • Hearing loss Father    • Aneurysm Brother         abdominal aortic aneurysm      Social History:     Social History     Socioeconomic History   • Marital status: /Civil Union     Spouse name: None   • Number of children: None   • Years of education: None   • Highest education level: None   Occupational History   • None   Tobacco Use   • Smoking status: Former     Packs/day: 1.00     Years: 5.00     Total pack years: 5.00     Types: Cigarettes     Start date: 1957     Quit date: 1970     Years since quittin.6   • Smokeless tobacco: Never   Vaping Use   • Vaping Use: Never used   Substance and Sexual Activity   • Alcohol use: Not Currently     Comment: quit    • Drug use: Never   • Sexual activity: None   Other Topics Concern   • None   Social History Narrative   • None     Social Determinants of Health     Financial Resource Strain: Low Risk  (2023)    Overall Financial Resource Strain (CARDIA)    • Difficulty of Paying Living Expenses: Not very hard   Food Insecurity: Not on file   Transportation Needs: No Transportation Needs (2023)    PRAPARE - Transportation    • Lack of Transportation (Medical): No    • Lack of Transportation (Non-Medical):  No   Physical Activity: Not on file   Stress: Not on file   Social Connections: Not on file   Intimate Partner Violence: Not on file   Housing Stability: Not on file      Medications and Allergies:     Current Outpatient Medications   Medication Sig Dispense Refill   • aspirin 81 MG tablet Take 1 tablet by mouth daily at bedtime      • atorvastatin (LIPITOR) 10 mg tablet TAKE 1 TABLET BY MOUTH  DAILY 90 tablet 3   • Coenzyme Q10 (CoQ10) 100 MG CAPS Take by mouth     • ibuprofen (MOTRIN) 200 mg tablet Take 1 tablet by mouth 3 (three) times a day as needed     • losartan (COZAAR) 50 mg tablet TAKE 1 TABLET BY MOUTH DAILY 90 tablet 3   • tamsulosin (FLOMAX) 0.4 mg Take 1 capsule (0.4 mg total) by mouth daily with dinner 90 capsule 3   • VITAMIN D PO Take 2,000 Units by mouth every morning        No current facility-administered medications for this visit. Allergies   Allergen Reactions   • Acetaminophen Throat Swelling   • Amoxicillin Rash   • Lisinopril Cough      Immunizations:     Immunization History   Administered Date(s) Administered   • COVID-19 MODERNA VACC 0.5 ML IM 03/02/2021, 03/30/2021   • Influenza Split High Dose Preservative Free IM 10/02/2012, 10/25/2013, 10/15/2014, 10/23/2015, 10/26/2016, 09/29/2017   • Influenza, high dose seasonal 0.7 mL 10/29/2018, 11/04/2019, 11/20/2020, 10/15/2021, 10/20/2022, 10/13/2023   • Influenza, seasonal, injectable 11/11/2005, 11/21/2006, 11/13/2007, 11/24/2008, 12/04/2009, 12/01/2010, 09/19/2011   • Pneumococcal Conjugate 13-Valent 10/26/2016   • Pneumococcal Polysaccharide PPV23 11/21/2006, 11/18/2019   • Td (adult), adsorbed 10/24/2000   • Zoster 10/28/2015      Health Maintenance:         Topic Date Due   • Colorectal Cancer Screening  01/13/2026     There are no preventive care reminders to display for this patient. Medicare Screening Tests and Risk Assessments:     Georgina Jansen is here for his Subsequent Wellness visit. Last Medicare Wellness visit information reviewed, patient interviewed and updates made to the record today. Health Risk Assessment:   Patient rates overall health as good. Patient feels that their physical health rating is same. Patient is satisfied with their life. Eyesight was rated as same. Hearing was rated as same. Patient feels that their emotional and mental health rating is same. Patients states they are never, rarely angry. Patient states they are often unusually tired/fatigued. Pain experienced in the last 7 days has been none. Patient states that he has experienced no weight loss or gain in last 6 months. Depression Screening:   PHQ-9 Score: 2      Fall Risk Screening:    In the past year, patient has experienced: no history of falling in past year      Home Safety:  Patient does not have trouble with stairs inside or outside of their home. Patient has working smoke alarms and has working carbon monoxide detector. Home safety hazards include: none. Nutrition:   Current diet is Regular and No Added Salt. Medications:   Patient is currently taking over-the-counter supplements. OTC medications include: see medication list. Patient is able to manage medications. Activities of Daily Living (ADLs)/Instrumental Activities of Daily Living (IADLs):   Walk and transfer into and out of bed and chair?: Yes  Dress and groom yourself?: Yes    Bathe or shower yourself?: Yes    Feed yourself? Yes  Do your laundry/housekeeping?: Yes  Manage your money, pay your bills and track your expenses?: Yes  Make your own meals?: Yes    Do your own shopping?: Yes    Previous Hospitalizations:   Any hospitalizations or ED visits within the last 12 months?: No      Advance Care Planning:   Living will: Yes    Durable POA for healthcare:  Yes    Advanced directive: Yes      Cognitive Screening:   Provider or family/friend/caregiver concerned regarding cognition?: No    PREVENTIVE SCREENINGS      Cardiovascular Screening:    General: Screening Not Indicated and History Lipid Disorder      Diabetes Screening:     General: Screening Current      Colorectal Cancer Screening:     General: Screening Current      Prostate Cancer Screening:    General: Screening Not Indicated      Osteoporosis Screening:    General: Risks and Benefits Discussed      Abdominal Aortic Aneurysm (AAA) Screening:    Risk factors include: tobacco use        General: Risks and Benefits Discussed      Lung Cancer Screening:     General: Screening Not Indicated      Hepatitis C Screening:    General: Screening Current    Screening, Brief Intervention, and Referral to Treatment (SBIRT)    Screening  Typical number of drinks in a day: 0  Typical number of drinks in a week: 0  Interpretation: Low risk drinking behavior. Single Item Drug Screening:  How often have you used an illegal drug (including marijuana) or a prescription medication for non-medical reasons in the past year? never    Single Item Drug Screen Score: 0  Interpretation: Negative screen for possible drug use disorder    No results found. Physical Exam:     /62 (BP Location: Left arm, Patient Position: Sitting, Cuff Size: Large)   Pulse 72   Temp (!) 96.1 °F (35.6 °C)   Resp 16   Ht 5' 6.5" (1.689 m)   Wt 71.7 kg (158 lb)   SpO2 99%   BMI 25.12 kg/m²     Physical Exam  Vitals reviewed. Constitutional:       Appearance: He is well-developed. HENT:      Head: Normocephalic and atraumatic. Right Ear: Tympanic membrane, ear canal and external ear normal.      Left Ear: Tympanic membrane, ear canal and external ear normal.      Nose: Nose normal.      Mouth/Throat:      Mouth: Mucous membranes are moist.   Eyes:      Conjunctiva/sclera: Conjunctivae normal.      Pupils: Pupils are equal, round, and reactive to light. Cardiovascular:      Rate and Rhythm: Normal rate and regular rhythm. Heart sounds: Normal heart sounds. Pulmonary:      Effort: Pulmonary effort is normal.      Breath sounds: Normal breath sounds. No wheezing. Abdominal:      General: Bowel sounds are normal. There is no distension. Palpations: Abdomen is soft. There is no mass. Tenderness: There is no abdominal tenderness. Genitourinary:     Penis: Normal.    Musculoskeletal:         General: No tenderness. Normal range of motion. Cervical back: Normal range of motion and neck supple. Skin:     General: Skin is warm. Capillary Refill: Capillary refill takes less than 2 seconds. Neurological:      Mental Status: He is alert and oriented to person, place, and time. Cranial Nerves: No cranial nerve deficit. Sensory: No sensory deficit. Motor: No abnormal muscle tone.       Coordination: Coordination normal.      Deep Tendon Reflexes: Reflexes normal.   Psychiatric:         Behavior: Behavior normal.         Thought Content:  Thought content normal.         Judgment: Judgment normal.          Maurice Rivera MD

## 2023-12-04 NOTE — PATIENT INSTRUCTIONS
Medicare Preventive Visit Patient Instructions  Thank you for completing your Welcome to Medicare Visit or Medicare Annual Wellness Visit today. Your next wellness visit will be due in one year (12/4/2024). The screening/preventive services that you may require over the next 5-10 years are detailed below. Some tests may not apply to you based off risk factors and/or age. Screening tests ordered at today's visit but not completed yet may show as past due. Also, please note that scanned in results may not display below. Preventive Screenings:  Service Recommendations Previous Testing/Comments   Colorectal Cancer Screening  Colonoscopy    Fecal Occult Blood Test (FOBT)/Fecal Immunochemical Test (FIT)  Fecal DNA/Cologuard Test  Flexible Sigmoidoscopy Age: 43-73 years old   Colonoscopy: every 10 years (May be performed more frequently if at higher risk)  OR  FOBT/FIT: every 1 year  OR  Cologuard: every 3 years  OR  Sigmoidoscopy: every 5 years  Screening may be recommended earlier than age 39 if at higher risk for colorectal cancer. Also, an individualized decision between you and your healthcare provider will decide whether screening between the ages of 77-80 would be appropriate.  Colonoscopy: 01/13/2021  FOBT/FIT: Not on file  Cologuard: Not on file  Sigmoidoscopy: Not on file    Screening Current     Prostate Cancer Screening Individualized decision between patient and health care provider in men between ages of 53-66   Medicare will cover every 12 months beginning on the day after your 50th birthday PSA: 1.06 ng/mL     Screening Not Indicated     Hepatitis C Screening Once for adults born between 1945 and 1965  More frequently in patients at high risk for Hepatitis C Hep C Antibody: Not on file        Diabetes Screening 1-2 times per year if you're at risk for diabetes or have pre-diabetes Fasting glucose: 108 mg/dL (11/21/2023)  A1C: 5.7 % (12/4/2019)  Screening Current   Cholesterol Screening Once every 5 years if you don't have a lipid disorder. May order more often based on risk factors. Lipid panel: 11/21/2023  Screening Not Indicated  History Lipid Disorder      Other Preventive Screenings Covered by Medicare:  Abdominal Aortic Aneurysm (AAA) Screening: covered once if your at risk. You're considered to be at risk if you have a family history of AAA or a male between the age of 70-76 who smoking at least 100 cigarettes in your lifetime. Lung Cancer Screening: covers low dose CT scan once per year if you meet all of the following conditions: (1) Age 48-67; (2) No signs or symptoms of lung cancer; (3) Current smoker or have quit smoking within the last 15 years; (4) You have a tobacco smoking history of at least 20 pack years (packs per day x number of years you smoked); (5) You get a written order from a healthcare provider. Glaucoma Screening: covered annually if you're considered high risk: (1) You have diabetes OR (2) Family history of glaucoma OR (3)  aged 48 and older OR (3)  American aged 72 and older  Osteoporosis Screening: covered every 2 years if you meet one of the following conditions: (1) Have a vertebral abnormality; (2) On glucocorticoid therapy for more than 3 months; (3) Have primary hyperparathyroidism; (4) On osteoporosis medications and need to assess response to drug therapy. HIV Screening: covered annually if you're between the age of 14-79. Also covered annually if you are younger than 13 and older than 72 with risk factors for HIV infection. For pregnant patients, it is covered up to 3 times per pregnancy.     Immunizations:  Immunization Recommendations   Influenza Vaccine Annual influenza vaccination during flu season is recommended for all persons aged >= 6 months who do not have contraindications   Pneumococcal Vaccine   * Pneumococcal conjugate vaccine = PCV13 (Prevnar 13), PCV15 (Vaxneuvance), PCV20 (Prevnar 20)  * Pneumococcal polysaccharide vaccine = PPSV23 (Pneumovax) Adults 81-76 yo with certain risk factors or if 69+ yo  If never received any pneumonia vaccine: recommend Prevnar 20 (PCV20)  Give PCV20 if previously received 1 dose of PCV13 or PPSV23   Hepatitis B Vaccine 3 dose series if at intermediate or high risk (ex: diabetes, end stage renal disease, liver disease)   Respiratory syncytial virus (RSV) Vaccine - COVERED BY MEDICARE PART D  * RSVPreF3 (Arexvy) CDC recommends that adults 61years of age and older may receive a single dose of RSV vaccine using shared clinical decision-making (SCDM)   Tetanus (Td) Vaccine - COST NOT COVERED BY MEDICARE PART B Following completion of primary series, a booster dose should be given every 10 years to maintain immunity against tetanus. Td may also be given as tetanus wound prophylaxis. Tdap Vaccine - COST NOT COVERED BY MEDICARE PART B Recommended at least once for all adults. For pregnant patients, recommended with each pregnancy. Shingles Vaccine (Shingrix) - COST NOT COVERED BY MEDICARE PART B  2 shot series recommended in those 19 years and older who have or will have weakened immune systems or those 50 years and older     Health Maintenance Due:      Topic Date Due   • Colorectal Cancer Screening  01/13/2026     Immunizations Due:  There are no preventive care reminders to display for this patient. Advance Directives   What are advance directives? Advance directives are legal documents that state your wishes and plans for medical care. These plans are made ahead of time in case you lose your ability to make decisions for yourself. Advance directives can apply to any medical decision, such as the treatments you want, and if you want to donate organs. What are the types of advance directives? There are many types of advance directives, and each state has rules about how to use them. You may choose a combination of any of the following:  Living will: This is a written record of the treatment you want.  You can also choose which treatments you do not want, which to limit, and which to stop at a certain time. This includes surgery, medicine, IV fluid, and tube feedings. Durable power of  for healthcare [facility]): This is a written record that states who you want to make healthcare choices for you when you are unable to make them for yourself. This person, called a proxy, is usually a family member or a friend. You may choose more than 1 proxy. Do not resuscitate (DNR) order:  A DNR order is used in case your heart stops beating or you stop breathing. It is a request not to have certain forms of treatment, such as CPR. A DNR order may be included in other types of advance directives. Medical directive: This covers the care that you want if you are in a coma, near death, or unable to make decisions for yourself. You can list the treatments you want for each condition. Treatment may include pain medicine, surgery, blood transfusions, dialysis, IV or tube feedings, and a ventilator (breathing machine). Values history: This document has questions about your views, beliefs, and how you feel and think about life. This information can help others choose the care that you would choose. Why are advance directives important? An advance directive helps you control your care. Although spoken wishes may be used, it is better to have your wishes written down. Spoken wishes can be misunderstood, or not followed. Treatments may be given even if you do not want them. An advance directive may make it easier for your family to make difficult choices about your care. Weight Management   Why it is important to manage your weight:  Being overweight increases your risk of health conditions such as heart disease, high blood pressure, type 2 diabetes, and certain types of cancer. It can also increase your risk for osteoarthritis, sleep apnea, and other respiratory problems. Aim for a slow, steady weight loss.  Even a small amount of weight loss can lower your risk of health problems. How to lose weight safely:  A safe and healthy way to lose weight is to eat fewer calories and get regular exercise. You can lose up about 1 pound a week by decreasing the number of calories you eat by 500 calories each day. Healthy meal plan for weight management:  A healthy meal plan includes a variety of foods, contains fewer calories, and helps you stay healthy. A healthy meal plan includes the following:  Eat whole-grain foods more often. A healthy meal plan should contain fiber. Fiber is the part of grains, fruits, and vegetables that is not broken down by your body. Whole-grain foods are healthy and provide extra fiber in your diet. Some examples of whole-grain foods are whole-wheat breads and pastas, oatmeal, brown rice, and bulgur. Eat a variety of vegetables every day. Include dark, leafy greens such as spinach, kale, carmencita greens, and mustard greens. Eat yellow and orange vegetables such as carrots, sweet potatoes, and winter squash. Eat a variety of fruits every day. Choose fresh or canned fruit (canned in its own juice or light syrup) instead of juice. Fruit juice has very little or no fiber. Eat low-fat dairy foods. Drink fat-free (skim) milk or 1% milk. Eat fat-free yogurt and low-fat cottage cheese. Try low-fat cheeses such as mozzarella and other reduced-fat cheeses. Choose meat and other protein foods that are low in fat. Choose beans or other legumes such as split peas or lentils. Choose fish, skinless poultry (chicken or turkey), or lean cuts of red meat (beef or pork). Before you cook meat or poultry, cut off any visible fat. Use less fat and oil. Try baking foods instead of frying them. Add less fat, such as margarine, sour cream, regular salad dressing and mayonnaise to foods. Eat fewer high-fat foods. Some examples of high-fat foods include french fries, doughnuts, ice cream, and cakes. Eat fewer sweets.   Limit foods and drinks that are high in sugar. This includes candy, cookies, regular soda, and sweetened drinks. Exercise:  Exercise at least 30 minutes per day on most days of the week. Some examples of exercise include walking, biking, dancing, and swimming. You can also fit in more physical activity by taking the stairs instead of the elevator or parking farther away from stores. Ask your healthcare provider about the best exercise plan for you. © Copyright [employer] 2018 Information is for End User's use only and may not be sold, redistributed or otherwise used for commercial purposes.  All illustrations and images included in CareNotes® are the copyrighted property of A.D.A.M., Inc. or  [address]

## 2023-12-06 ENCOUNTER — TELEPHONE (OUTPATIENT)
Age: 83
End: 2023-12-06

## 2023-12-06 NOTE — TELEPHONE ENCOUNTER
New Patient    What is the reason for the patient’s appointment?: urinary frequency, pt has family history of bladder cancer     Patient is on Tamsulosin 0.4 mg prescribed by PCP     What office location does the patient prefer?: [facility]     Does patient have Imaging/Lab Results:    Last PSA done on 11/21/23     Have patient records been requested?:  If No, are the records showing in Epic: records in epic       HISTORY:   Has the patient had any previous Urologist(s)?: No     Patient is now scheduled on 12/12/23 with Dr. Luiz Fay at our [facility] office.

## 2023-12-12 ENCOUNTER — OFFICE VISIT (OUTPATIENT)
Dept: UROLOGY | Facility: CLINIC | Age: 83
End: 2023-12-12
Payer: COMMERCIAL

## 2023-12-12 ENCOUNTER — TELEPHONE (OUTPATIENT)
Dept: UROLOGY | Facility: CLINIC | Age: 83
End: 2023-12-12

## 2023-12-12 VITALS
DIASTOLIC BLOOD PRESSURE: 80 MMHG | SYSTOLIC BLOOD PRESSURE: 140 MMHG | RESPIRATION RATE: 18 BRPM | HEIGHT: 66 IN | BODY MASS INDEX: 25.26 KG/M2 | HEART RATE: 94 BPM | OXYGEN SATURATION: 97 % | WEIGHT: 157.2 LBS

## 2023-12-12 DIAGNOSIS — N40.0 BENIGN PROSTATIC HYPERPLASIA WITHOUT LOWER URINARY TRACT SYMPTOMS: Primary | ICD-10-CM

## 2023-12-12 DIAGNOSIS — R35.0 URINE FREQUENCY: ICD-10-CM

## 2023-12-12 PROCEDURE — 99203 OFFICE O/P NEW LOW 30 MIN: CPT | Performed by: UROLOGY

## 2023-12-12 NOTE — PATIENT INSTRUCTIONS
We will double the dose of Flomax in the evening. Call if you get side effects such as lightheadedness or dizziness. If after several weeks you are not improved then we could proceed with finasteride which will shrink the prostate. That might take a few months to work. Has side effects as we discussed. Otherwise we will just see you back to check your bladder in 6 months to make sure that you are emptying appropriately.

## 2023-12-12 NOTE — PROGRESS NOTES
Office Visit- Urology  Yeni Cary 1940 MRN: [id number]      Assessment/Discussion/Plan  72-year-old gentleman with obstructive BPH symptoms for the last couple of years. Started tamsulosin in May 2023 by his primary care. No hematuria or dysuria. No urinary infections. Family history of bladder cancer in his brother who was a pipe smoker. No recent ALEXANDRA. Prostate exam reveals mild enlargement of the prostate. He gets a little lightheaded after his workout in the morning. He then takes his blood pressure medication. He does not take his tamsulosin until the evening time. He is willing to double the dose. He will call if this does not improve his symptoms at all. We can always pursue uroflow and cystoscopy if that is the case otherwise we will just see him back in 6 months for a PVR. Consider finasteride. SED. Will call if not better and can prescribe. Patient Instructions   We will double the dose of Flomax in the evening. Call if you get side effects such as lightheadedness or dizziness. If after several weeks you are not improved then we could proceed with finasteride which will shrink the prostate. That might take a few months to work. Has side effects as we discussed. Otherwise we will just see you back to check your bladder in 6 months to make sure that you are emptying appropriately. Chief Complaint:   Laci Kaur is a 81 y.o. male presenting to the office for an initial evaluation regarding  BPH symptoms of he will get up a few times each night.     AUA SYMPTOM SCORE      Flowsheet Row Most Recent Value   AUA SYMPTOM SCORE    How often have you had a sensation of not emptying your bladder completely after you finished urinating? 0 (P)     How often have you had to urinate again less than two hours after you finished urinating? 1 (P)     How often have you found you stopped and started again several times when you urinate? 1 (P)     How often have you found it difficult to postpone urination? 1 (P)     How often have you had a weak urinary stream? 2 (P)     How often have you had to push or strain to begin urination? 0 (P)     How many times did you most typically get up to urinate from the time you went to bed at night until the time you got up in the morning? 1 (P)     Quality of Life: If you were to spend the rest of your life with your urinary condition just the way it is now, how would you feel about that? 3 (P)     AUA SYMPTOM SCORE 6 (P)             ROS:   Review of Systems      Past Medical History  Past Medical History:   Diagnosis Date    Allergic     Arthritis     Benign essential hypertension     Coronary artery disease     Hearing problem     HL (hearing loss)     Hx of CABG     Hypercholesteremia     Hypertension     Wears glasses     as needed    Wears hearing aid     bilateral aids       Past Surgical History  Past Surgical History:   Procedure Laterality Date    COLONOSCOPY  01/13/2021    CORONARY ARTERY BYPASS GRAFT      triple    EGD      stomach polyp    EYE SURGERY  3/2021    HERNIA REPAIR Bilateral     inguinal    KNEE SURGERY Right     ligament    LASIK  2000    DE XCAPSL CTRC RMVL INSJ IO LENS PROSTH W/O ECP Left 02/08/2021    Procedure: EXTRACTION EXTRACAPSULAR CATARACT PHACO INTRAOCULAR LENS (IOL); Surgeon: Carla Swain MD;  Location: [facility] MAIN OR;  Service: Ophthalmology    DE XCAPSL CTRC RMVL INSJ IO LENS PROSTH W/O ECP Right 03/15/2021    Procedure: EXTRACTION EXTRACAPSULAR CATARACT PHACO INTRAOCULAR LENS (IOL);   Surgeon: Carla Swain MD;  Location: [facility] MAIN OR;  Service: Ophthalmology       Past Family History  Family History   Problem Relation Age of Onset    Hypertension Mother     Dementia Mother     Coronary artery disease Father     Stroke Father     Heart disease Father     Hearing loss Father     Aneurysm Brother         abdominal aortic aneurysm       Past Social history  Social History     Socioeconomic History    Marital status: /Civil Union     Spouse name: Not on file    Number of children: Not on file    Years of education: Not on file    Highest education level: Not on file   Occupational History    Not on file   Tobacco Use    Smoking status: Former     Packs/day: 1.00     Years: 5.00     Total pack years: 5.00     Types: Cigarettes     Start date: 1957     Quit date: 1970     Years since quittin.7    Smokeless tobacco: Never   Vaping Use    Vaping Use: Never used   Substance and Sexual Activity    Alcohol use: Not Currently     Comment: quit     Drug use: Never    Sexual activity: Not on file   Other Topics Concern    Not on file   Social History Narrative    Not on file     Social Determinants of Health     Financial Resource Strain: Low Risk  (2023)    Overall Financial Resource Strain (CARDIA)     Difficulty of Paying Living Expenses: Not very hard   Food Insecurity: Not on file   Transportation Needs: No Transportation Needs (2023)    PRAPARE - Transportation     Lack of Transportation (Medical): No     Lack of Transportation (Non-Medical): No   Physical Activity: Not on file   Stress: Not on file   Social Connections: Not on file   Intimate Partner Violence: Not on file   Housing Stability: Not on file       Current Medications  Current Outpatient Medications   Medication Sig Dispense Refill    aspirin 81 MG tablet Take 1 tablet by mouth daily at bedtime       atorvastatin (LIPITOR) 10 mg tablet TAKE 1 TABLET BY MOUTH  DAILY 90 tablet 3    Coenzyme Q10 (CoQ10) 100 MG CAPS Take by mouth      ibuprofen (MOTRIN) 200 mg tablet Take 1 tablet by mouth 3 (three) times a day as needed      losartan (COZAAR) 50 mg tablet TAKE 1 TABLET BY MOUTH DAILY 90 tablet 3    tamsulosin (FLOMAX) 0.4 mg Take 1 capsule (0.4 mg total) by mouth daily with dinner 90 capsule 3    VITAMIN D PO Take 2,000 Units by mouth every morning        No current facility-administered medications for this visit.        Allergies  Allergies Allergen Reactions    Acetaminophen Throat Swelling    Amoxicillin Rash    Lisinopril Cough       OBJECTIVE    Vitals   Vitals:    12/12/23 0857   BP: 140/80   BP Location: Left arm   Patient Position: Sitting   Cuff Size: Large   Pulse: 94   Resp: 18   SpO2: 97%   Weight: 71.3 kg (157 lb 3.2 oz)   Height: 5' 6" (1.676 m)       PVR:    Physical Exam     Labs:     Lab Results   Component Value Date    PSA 1.06 11/21/2023    PSA 1.0 11/28/2022    PSA 1.2 12/02/2021    PSA 1.6 11/30/2020     Lab Results   Component Value Date    CREATININE 1.06 11/21/2023      Lab Results   Component Value Date    HGBA1C 5.7 12/04/2019     Lab Results   Component Value Date    GLUCOSE 105 10/23/2015    CALCIUM 9.0 11/21/2023     10/23/2015    K 4.0 11/21/2023    CO2 28 11/21/2023     11/21/2023    BUN 18 11/21/2023    CREATININE 1.06 11/21/2023       I have personally reviewed all pertinent lab results and reviewed with patient    Tracy Hanks MD  Date: 12/12/2023 Time: 9:41 AM  [facility]    This note was written using fluency dictation software. Please excuse any resulting minor grammatical errors.

## 2023-12-14 ENCOUNTER — OFFICE VISIT (OUTPATIENT)
Dept: FAMILY MEDICINE CLINIC | Facility: CLINIC | Age: 83
End: 2023-12-14
Payer: COMMERCIAL

## 2023-12-14 VITALS
RESPIRATION RATE: 14 BRPM | SYSTOLIC BLOOD PRESSURE: 130 MMHG | DIASTOLIC BLOOD PRESSURE: 80 MMHG | OXYGEN SATURATION: 98 % | HEART RATE: 66 BPM | BODY MASS INDEX: 25.71 KG/M2 | HEIGHT: 66 IN | WEIGHT: 160 LBS | TEMPERATURE: 98.1 F

## 2023-12-14 DIAGNOSIS — N18.31 STAGE 3A CHRONIC KIDNEY DISEASE (HCC): ICD-10-CM

## 2023-12-14 DIAGNOSIS — N40.0 BENIGN PROSTATIC HYPERPLASIA WITHOUT LOWER URINARY TRACT SYMPTOMS: ICD-10-CM

## 2023-12-14 DIAGNOSIS — B35.1 ONYCHOMYCOSIS: Primary | ICD-10-CM

## 2023-12-14 DIAGNOSIS — E72.12 METHYLENETETRAHYDROFOLATE REDUCTASE DEFICIENCY (HCC): ICD-10-CM

## 2023-12-14 DIAGNOSIS — I25.119 CORONARY ARTERY DISEASE INVOLVING NATIVE CORONARY ARTERY OF NATIVE HEART WITH ANGINA PECTORIS (HCC): ICD-10-CM

## 2023-12-14 PROCEDURE — 99214 OFFICE O/P EST MOD 30 MIN: CPT | Performed by: STUDENT IN AN ORGANIZED HEALTH CARE EDUCATION/TRAINING PROGRAM

## 2023-12-14 RX ORDER — CICLOPIROX OLAMINE 7.7 MG/100ML
1 SUSPENSION TOPICAL 2 TIMES DAILY
Qty: 30 ML | Refills: 0 | Status: SHIPPED | OUTPATIENT
Start: 2023-12-14

## 2023-12-14 NOTE — PROGRESS NOTES
Clinic Visit Note  Shalonda Blanco 81 y.o. male   MRN: [id number]    Assessment and Plan      Diagnoses and all orders for this visit:    Onychomycosis  Recommend treatment trial for 3 weeks, if no response podiatry referral recommended  -     ciclopirox (LOPROX) 0.77 % SUSP; Apply 1 Application topically 2 (two) times a day    Benign prostatic hyperplasia without lower urinary tract symptoms  Flomax twice daily, following urology    Coronary artery disease involving native coronary artery of native heart with angina pectoris ([address])  Blood pressure stable, aspirin/statin    Stage 3a chronic kidney disease ([address])  Repeat annual visit    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 Discharge instructions were provided. Subjective     Chief Complaint: F/U    History of Present Illness:    Patient is a pleasant 52-year-old male coming in for toenail fungus concern, Flomax has been recently increased to twice daily with urology support. The following portions of the patient's history were reviewed and updated as appropriate: allergies, current medications, past family history, past medical history, past social history, past surgical history and problem list.    REVIEW OF SYSTEMS:  A complete 12-point review of systems is negative other than that noted in the HPI. Review of Systems   Constitutional:  Negative for chills, fatigue and fever. HENT:  Negative for congestion and sore throat. Eyes:  Negative for pain and visual disturbance. Respiratory:  Negative for shortness of breath and wheezing. Cardiovascular:  Negative for chest pain and palpitations. Gastrointestinal:  Negative for abdominal pain, constipation, diarrhea, nausea and vomiting. Genitourinary:  Negative for dysuria and frequency.    Musculoskeletal:  Negative for back pain and neck pain.   Skin:  Negative for color change and rash. Neurological:  Negative for dizziness and headaches. Psychiatric/Behavioral:  Negative for agitation and confusion. All other systems reviewed and are negative. Current Outpatient Medications:   •  aspirin 81 MG tablet, Take 1 tablet by mouth daily at bedtime , Disp: , Rfl:   •  atorvastatin (LIPITOR) 10 mg tablet, TAKE 1 TABLET BY MOUTH  DAILY, Disp: 90 tablet, Rfl: 3  •  ciclopirox (LOPROX) 0.77 % SUSP, Apply 1 Application topically 2 (two) times a day, Disp: 30 mL, Rfl: 0  •  Coenzyme Q10 (CoQ10) 100 MG CAPS, Take by mouth, Disp: , Rfl:   •  ibuprofen (MOTRIN) 200 mg tablet, Take 1 tablet by mouth 3 (three) times a day as needed, Disp: , Rfl:   •  losartan (COZAAR) 50 mg tablet, TAKE 1 TABLET BY MOUTH DAILY, Disp: 90 tablet, Rfl: 3  •  tamsulosin (FLOMAX) 0.4 mg, Take 1 capsule (0.4 mg total) by mouth daily with dinner, Disp: 90 capsule, Rfl: 3  •  VITAMIN D PO, Take 2,000 Units by mouth every morning , Disp: , Rfl:   Past Medical History:   Diagnosis Date   • Allergic    • Arthritis    • Benign essential hypertension    • Coronary artery disease    • Hearing problem    • HL (hearing loss)    • Hx of CABG    • Hypercholesteremia    • Hypertension    • Wears glasses     as needed   • Wears hearing aid     bilateral aids     Past Surgical History:   Procedure Laterality Date   • COLONOSCOPY  01/13/2021   • CORONARY ARTERY BYPASS GRAFT      triple   • EGD      stomach polyp   • EYE SURGERY  3/2021   • HERNIA REPAIR Bilateral     inguinal   • KNEE SURGERY Right     ligament   • LASIK  2000   • [place] XCAPSL CTRC RMVL INSJ IO LENS PROSTH W/O ECP Left 02/08/2021    Procedure: EXTRACTION EXTRACAPSULAR CATARACT PHACO INTRAOCULAR LENS (IOL);   Surgeon: Von Melton MD;  Location: [facility] MAIN OR;  Service: Ophthalmology   • [place] XCAPSL CTRC RMVL INSJ IO LENS PROSTH W/O ECP Right 03/15/2021    Procedure: EXTRACTION EXTRACAPSULAR CATARACT PHACO INTRAOCULAR LENS (IOL); Surgeon: Sharyle Bowen, MD;  Location: [facility] MAIN OR;  Service: Ophthalmology     Social History     Socioeconomic History   • Marital status: /Civil Union     Spouse name: Not on file   • Number of children: Not on file   • Years of education: Not on file   • Highest education level: Not on file   Occupational History   • Not on file   Tobacco Use   • Smoking status: Former     Current packs/day: 0.00     Average packs/day: 1 pack/day for 12.8 years (12.8 ttl pk-yrs)     Types: Cigarettes     Start date: 1957     Quit date: 1970     Years since quittin.7   • Smokeless tobacco: Never   Vaping Use   • Vaping status: Never Used   Substance and Sexual Activity   • Alcohol use: Not Currently     Comment: quit    • Drug use: Never   • Sexual activity: Not on file   Other Topics Concern   • Not on file   Social History Narrative   • Not on file     Social Determinants of Health     Financial Resource Strain: Low Risk  (2023)    Overall Financial Resource Strain (CARDIA)    • Difficulty of Paying Living Expenses: Not very hard   Food Insecurity: Not on file   Transportation Needs: No Transportation Needs (2023)    PRAPARE - Transportation    • Lack of Transportation (Medical): No    • Lack of Transportation (Non-Medical):  No   Physical Activity: Not on file   Stress: Not on file   Social Connections: Not on file   Intimate Partner Violence: Not on file   Housing Stability: Not on file     Family History   Problem Relation Age of Onset   • Hypertension Mother    • Dementia Mother    • Coronary artery disease Father    • Stroke Father    • Heart disease Father    • Hearing loss Father    • Aneurysm Brother         abdominal aortic aneurysm     Allergies   Allergen Reactions   • Acetaminophen Throat Swelling   • Amoxicillin Rash   • Lisinopril Cough       Objective     Vitals:    23 1053   BP: 130/80   BP Location: Left arm   Patient Position: Sitting   Cuff Size: Standard Pulse: 66   Resp: 14   Temp: 98.1 °F (36.7 °C)   TempSrc: Temporal   SpO2: 98%   Weight: 72.6 kg (160 lb)   Height: 5' 6" (1.676 m)       Physical Exam:     GENERAL: NAD, pleasant   HEENT:  NC/AT, PERRL, EOMI, no scleral icterus  CARDIAC:  RRR, +S1/S2, no S3/S4 appreciated, no m/g/r  PULMONARY:  CTA B/L, no wheezing/rales/rhonci, non-labored breathing  ABDOMEN:  Soft, NT/ND, no rebound/guarding/rigidity  Extremities:. No edema, cyanosis, or clubbing  Musculoskeletal:  Full range of motion intact in all extremities   NEUROLOGIC: Grossly intact, no focal deficits  SKIN:  No rashes or erythema noted on exposed skin  Psych: Normal affect, mood stable    ==  PLEASE NOTE:  This encounter was completed utilizing the [employer] Direct Speech Voice Recognition Software. Grammatical errors, random word insertions, pronoun errors and incomplete sentences are occasional consequences of the system due to software limitations, ambient noise and hardware issues. These may be missed by proof reading prior to affixing electronic signature. Any questions or concerns about the content, text or information contained within the body of this dictation should be directly addressed to the physician for clarification. Please do not hesitate to call me directly if you have any any questions or concerns.     DO [facility]   [facility]

## 2023-12-18 ENCOUNTER — OFFICE VISIT (OUTPATIENT)
Dept: HEMATOLOGY ONCOLOGY | Facility: MEDICAL CENTER | Age: 83
End: 2023-12-18
Payer: COMMERCIAL

## 2023-12-18 VITALS
WEIGHT: 158 LBS | OXYGEN SATURATION: 97 % | RESPIRATION RATE: 17 BRPM | DIASTOLIC BLOOD PRESSURE: 68 MMHG | HEART RATE: 67 BPM | BODY MASS INDEX: 25.39 KG/M2 | TEMPERATURE: 98.1 F | SYSTOLIC BLOOD PRESSURE: 126 MMHG | HEIGHT: 66 IN

## 2023-12-18 DIAGNOSIS — E83.118 OTHER HEMOCHROMATOSIS: Primary | ICD-10-CM

## 2023-12-18 PROCEDURE — 99214 OFFICE O/P EST MOD 30 MIN: CPT | Performed by: INTERNAL MEDICINE

## 2023-12-18 NOTE — PROGRESS NOTES
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   Previously patient had undergone phlebotomies with normalization of the liver function tests.  Up until recently, patient had not undergone phlebotomy for approximately 6.5 years ago.  Mr. Nunez underwent phlebotomy at the end of the summer 2023.  From a hematology standpoint, patient continues to feel well and clinically there are no concerning signs.  The ferritin level is trended below, lower/much better than before.  Other iron studies, CBC parameters and LFTs are all good/WNL also.  The plan is to continue with surveillance.    Patient is to return in 1 year with repeat blood work before.  We agreed that an additional phlebotomy is not needed at this time.  Routine health maintenance and medical care is up-to-date.    Patient knows to call the hematology/oncology office if there are any other questions or concerns.    Carefully review your medication list and verify that the list is accurate and up-to-date. Please call the hematology/oncology office if there are medications missing from the list, medications on the list that you are not currently taking or if there is a dosage or instruction that is different from how you're taking that medication.    Patient goals and areas of care:   Yearly follow-ups, monitor CBC and LFTs  Barriers to care:   None  Patient is able to self-care  ____________________________________________________________________________________________________    Chief Complaint   Patient presents with    Follow-up    History of elevated ferritin level     History of Present Illness:    83-year-old male for seen in August 2012 for evaluation of abnormal liver function test.  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  After  discussing options, patient began phlebotomies; last in November 2015.  At that time, liver function tests have been normal.  Patient returns for follow-up.    Mr. Nunez feels well from a physical standpoint.  Appetite is good, weight is stable.  No shortness of breath or dyspnea on exertion.  No headaches, blurred vision or dizziness.  No GI or  issues.  Activities are baseline.  Patient has received his COVID vaccine.  Routine health maintenance and medical care is up-to-date.  Patient underwent phlebotomy approximately 5 months ago.    Review of Systems   Constitutional: Negative.    HENT: Negative.     Eyes: Negative.    Respiratory: Negative.     Cardiovascular: Negative.    Gastrointestinal: Negative.    Endocrine: Negative.    Genitourinary: Negative.    Musculoskeletal: Negative.    Skin: Negative.    Allergic/Immunologic: Negative.    Neurological: Negative.    Hematological: Negative.    Psychiatric/Behavioral: Negative.     All other systems reviewed and are negative.     Patient Active Problem List   Diagnosis    CAD, multiple vessel    S/P CABG x 4    Hyperlipidemia    Methylenetetrahydrofolate reductase deficiency (HCC)    Other hemochromatosis    Coronary artery disease involving native coronary artery of native heart with angina pectoris (HCC)    Stage 3a chronic kidney disease (HCC)    Benign prostatic hyperplasia without lower urinary tract symptoms    Depression, recurrent (HCC)     Past Medical History:   Diagnosis Date    Allergic     Arthritis     Benign essential hypertension     Coronary artery disease     Hearing problem     HL (hearing loss)     Hx of CABG     Hypercholesteremia     Hypertension     Wears glasses     as needed    Wears hearing aid     bilateral aids     Past Surgical History:   Procedure Laterality Date    COLONOSCOPY  01/13/2021    CORONARY ARTERY BYPASS GRAFT      triple    EGD      stomach polyp    EYE SURGERY  3/2021    HERNIA REPAIR Bilateral     inguinal    KNEE  SURGERY Right     ligament    LASIK  2000    [place] XCAPSL CTRC RMVL INSJ IO LENS PROSTH W/O ECP Left 2021    Procedure: EXTRACTION EXTRACAPSULAR CATARACT PHACO INTRAOCULAR LENS (IOL);  Surgeon: Yonis Sena MD;  Location: [facility] MAIN OR;  Service: Ophthalmology    [place] XCAPSL CTRC RMVL INSJ IO LENS PROSTH W/O ECP Right 03/15/2021    Procedure: EXTRACTION EXTRACAPSULAR CATARACT PHACO INTRAOCULAR LENS (IOL);  Surgeon: Yonis Sena MD;  Location: [facility] MAIN OR;  Service: Ophthalmology     Family History   Problem Relation Age of Onset    Hypertension Mother     Dementia Mother     Coronary artery disease Father     Stroke Father     Heart disease Father     Hearing loss Father     Aneurysm Brother         abdominal aortic aneurysm     Social History     Socioeconomic History    Marital status: /Civil Union     Spouse name: Not on file    Number of children: Not on file    Years of education: Not on file    Highest education level: Not on file   Occupational History    Not on file   Tobacco Use    Smoking status: Former     Current packs/day: 0.00     Average packs/day: 1 pack/day for 12.8 years (12.8 ttl pk-yrs)     Types: Cigarettes     Start date: 1957     Quit date: 1970     Years since quittin.7    Smokeless tobacco: Never   Vaping Use    Vaping status: Never Used   Substance and Sexual Activity    Alcohol use: Not Currently     Comment: quit     Drug use: Never    Sexual activity: Not on file   Other Topics Concern    Not on file   Social History Narrative    Not on file     Social Determinants of Health     Financial Resource Strain: Low Risk  (2023)    Overall Financial Resource Strain (CARDIA)     Difficulty of Paying Living Expenses: Not very hard   Food Insecurity: Not on file   Transportation Needs: No Transportation Needs (2023)    PRAPARE - Transportation     Lack of Transportation (Medical): No     Lack of Transportation (Non-Medical): No   Physical Activity: Not  on file   Stress: Not on file   Social Connections: Not on file   Intimate Partner Violence: Not on file   Housing Stability: Not on file       Current Outpatient Medications:     aspirin 81 MG tablet, Take 1 tablet by mouth daily at bedtime , Disp: , Rfl:     atorvastatin (LIPITOR) 10 mg tablet, TAKE 1 TABLET BY MOUTH  DAILY, Disp: 90 tablet, Rfl: 3    ciclopirox (LOPROX) 0.77 % SUSP, Apply 1 Application topically 2 (two) times a day, Disp: 30 mL, Rfl: 0    Coenzyme Q10 (CoQ10) 100 MG CAPS, Take by mouth, Disp: , Rfl:     ibuprofen (MOTRIN) 200 mg tablet, Take 1 tablet by mouth 3 (three) times a day as needed, Disp: , Rfl:     losartan (COZAAR) 50 mg tablet, TAKE 1 TABLET BY MOUTH DAILY, Disp: 90 tablet, Rfl: 3    tamsulosin (FLOMAX) 0.4 mg, Take 1 capsule (0.4 mg total) by mouth daily with dinner, Disp: 90 capsule, Rfl: 3    VITAMIN D PO, Take 2,000 Units by mouth every morning , Disp: , Rfl:     Allergies   Allergen Reactions    Acetaminophen Throat Swelling    Amoxicillin Rash    Lisinopril Cough       Vitals:    12/18/23 1120   BP: 126/68   Pulse: 67   Resp: 17   Temp: 98.1 °F (36.7 °C)   SpO2: 97%     Physical Exam  Constitutional:       Appearance: He is well-developed.      Comments: Well-nourished male, no respiratory distress   HENT:      Head: Normocephalic and atraumatic.      Right Ear: External ear normal.      Left Ear: External ear normal.   Eyes:      Conjunctiva/sclera: Conjunctivae normal.      Pupils: Pupils are equal, round, and reactive to light.   Neck:      Comments: Supple, no JVD  Cardiovascular:      Rate and Rhythm: Normal rate and regular rhythm.      Heart sounds: Normal heart sounds.   Pulmonary:      Effort: Pulmonary effort is normal.      Breath sounds: Normal breath sounds.   Abdominal:      General: Bowel sounds are normal.      Palpations: Abdomen is soft.      Comments: Soft, nontender, +bowel sounds, no hepatosplenomegaly   Musculoskeletal:         General: Normal range of  motion.      Cervical back: Normal range of motion and neck supple.      Comments: Full range of motion in all 4 extremities, no pain or tenderness with palpation of joints, muscles or bones   Skin:     General: Skin is warm.      Comments: Warm, moist, good color, no petechiae or ecchymoses   Neurological:      Mental Status: He is alert and oriented to person, place, and time.      Deep Tendon Reflexes: Reflexes are normal and symmetric.   Psychiatric:         Behavior: Behavior normal.         Thought Content: Thought content normal.         Judgment: Judgment normal.     Extremities:   No lower extremity edema bilaterally, no cords, pulses are 1+  Lymphatics:   No adenopathy in the neck, supraclavicular region, axilla and groin bilaterally    Labs        11/21/2023 WBC = 6.47 hemoglobin = 14.4 hematocrit = 43 platelet = 263 neutrophil = 66% BUN = 18 creatinine = 1.06 calcium = 9.0 LFTs WNL    11/28/2022 WBC = 6.1 hemoglobin = 14.6 hematocrit = 45 platelet = 284 neutrophil = 62% iron = 120 iron saturation = 43% TIBC = 282 BUN = 28 creatinine = 1.34 LFTs WNL  12/02/2021 WBC = 5.7 hemoglobin = 14.1 hematocrit = 43 platelet = 264 neutrophil = 61% UN = 17 creatinine = 1.27 LFTs [facility] PSA = 1.2 iron = 124 TIBC = 277 iron saturation = 45% ferritin = 145  11/30/2020 WBC = 5.91 hemoglobin = 14.7 hematocrit = 45 MCV = 89 platelet = 258 neutrophil = 63% BUN = 18 creatinine = 1.12 LFTs [facility] PSA = 1.6 iron = 96 TIBC = 278 iron saturation = 35% ferritin = 97  12/04/2019 WBC = 7.2 hemoglobin = 15.2 hematocrit = 46.7 MCV = 89 platelet = 280 neutrophil = 65% BUN = 19 creatinine = 1.30 calcium = 9.3 LFTs [facility] alkaline phosphatase = 53 ferritin = 99 PSA = 1.1  12/10/2018 WBC = 6.5 hemoglobin = 15.3 hematocrit = 47.1 platelet = 271 neutrophil = 64% BUN = 20 creatinine = 1.37 calcium = 9.4 AST = 20 ALT = 26 alkaline phosphatase = 51 total protein = 7.8 total bilirubin = 0.7 ferritin = 88  12/04/2017 WBC = 6.9 hemoglobin = 14.8  hematocrit = 45 platelet = 264 neutrophil = 60% glucose = 104 BUN = 19 creatinine = 1.16 AST = 17 ALT = 28 total bilirubin = 0.67 alkaline phosphatase = 55 ferritin = 86    Imaging    MRI of the abdomen with and without contrast performed on June 22, 2012 demonstrated 2 subcentimeter benign hemangiomas in the right hepatic lobe. Patient also had hepatic steatosis.    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

## 2024-01-02 ENCOUNTER — TELEPHONE (OUTPATIENT)
Age: 84
End: 2024-01-02

## 2024-01-02 DIAGNOSIS — R35.0 URINE FREQUENCY: ICD-10-CM

## 2024-01-02 RX ORDER — TAMSULOSIN HYDROCHLORIDE 0.4 MG/1
0.8 CAPSULE ORAL
Qty: 180 CAPSULE | Refills: 3 | Status: SHIPPED | OUTPATIENT
Start: 2024-01-02 | End: 2024-01-02 | Stop reason: SDUPTHER

## 2024-01-02 RX ORDER — TAMSULOSIN HYDROCHLORIDE 0.4 MG/1
0.8 CAPSULE ORAL
Qty: 180 CAPSULE | Refills: 3 | Status: SHIPPED | OUTPATIENT
Start: 2024-01-02

## 2024-01-02 NOTE — TELEPHONE ENCOUNTER
Patient called the Rx line. States the doctor told him to increase the Flomax so now taking 2 capsules and not 1. Needs the directions changed on medication and sent to [facility]. Please call patient to let him know that it was sent to the pharmacy.

## 2024-02-21 DIAGNOSIS — B35.1 ONYCHOMYCOSIS: ICD-10-CM

## 2024-02-21 RX ORDER — CICLOPIROX OLAMINE 7.7 MG/100ML
1 SUSPENSION TOPICAL 2 TIMES DAILY
Qty: 30 ML | Refills: 2 | Status: SHIPPED | OUTPATIENT
Start: 2024-02-21

## 2024-02-21 NOTE — TELEPHONE ENCOUNTER
Reason for call:   [x] Refill   [] Prior Auth  [] Other:     Office:   [x] PCP/Provider -   [] Specialty/Provider -     Medication: ciclopirox     Dose/Frequency: 0.77% susp apply 2x daily    Quantity: 30 mL + 2 refills    Pharmacy: [place], [place]    Does the patient have enough for 3 days?   [x] Yes   [] No - Send as HP to POD

## 2024-03-04 ENCOUNTER — TELEPHONE (OUTPATIENT)
Age: 84
End: 2024-03-04

## 2024-03-04 ENCOUNTER — OFFICE VISIT (OUTPATIENT)
Dept: URGENT CARE | Facility: CLINIC | Age: 84
End: 2024-03-04
Payer: COMMERCIAL

## 2024-03-04 VITALS
TEMPERATURE: 97.8 F | OXYGEN SATURATION: 97 % | RESPIRATION RATE: 18 BRPM | SYSTOLIC BLOOD PRESSURE: 158 MMHG | DIASTOLIC BLOOD PRESSURE: 86 MMHG | HEART RATE: 92 BPM

## 2024-03-04 DIAGNOSIS — J06.9 VIRAL UPPER RESPIRATORY TRACT INFECTION: Primary | ICD-10-CM

## 2024-03-04 PROCEDURE — 99213 OFFICE O/P EST LOW 20 MIN: CPT | Performed by: PHYSICIAN ASSISTANT

## 2024-03-04 NOTE — TELEPHONE ENCOUNTER
Pt called in stating he has a stuffy nose and a cough and he would like to be seen today. He really doesn't want to go to Urgent Care. Please let him know if he can be overbooked today.

## 2024-03-04 NOTE — PROGRESS NOTES
[facility]        NAME: Kristopher Nunez is a 83 y.o. male  : 1940    MRN: [id number]  DATE: 2024  TIME: 10:29 AM    Assessment and Plan   Viral upper respiratory tract infection [J06.9]  1. Viral upper respiratory tract infection          Patient Instructions   Viral upper respiratory infection  Recommend flonase nasal spray for postnasal drip/cough  Warm salt water gargles  Rest, fluids and supportive care  May benefit from a cool mist humidifier on night stand  Tylenol/ibuprofen as needed for pain/fever    Follow up with PCP in 3-5 days.  Proceed to  ER if symptoms worsen.    If tests have been performed at [facility], our office will contact you with results if changes need to be made to the care plan discussed with you at the visit.  [facility]    Chief Complaint     Chief Complaint   Patient presents with    URI     Pt c/o sinus congestion with head pressure and cough that started Friday night.         History of Present Illness       Kory is an 83-year-old male presents to clinic complaining of cough x 3 days.  He denies cough is dry nonproductive.  He also notes fever unknown degrees, nasal congestion, rhinorrhea.  He denies any chills, sore throat, ear pain, fatigue, myalgias, shortness of breath, nausea, vomiting, diarrhea, loss of taste or smell, recent travel, or exposure to anyone known COVID-positive.        Review of Systems   Review of Systems   Constitutional:  Positive for fever. Negative for chills and fatigue.   HENT:  Positive for congestion and rhinorrhea. Negative for ear pain, sinus pressure, sinus pain and sore throat.    Respiratory:  Positive for cough. Negative for shortness of breath.    Gastrointestinal:  Negative for diarrhea, nausea and vomiting.   Musculoskeletal:  Negative for myalgias.   Neurological:  Negative for headaches.         Current Medications       Current Outpatient Medications:     aspirin 81 MG  tablet, Take 1 tablet by mouth daily at bedtime , Disp: , Rfl:     atorvastatin (LIPITOR) 10 mg tablet, TAKE 1 TABLET BY MOUTH  DAILY, Disp: 90 tablet, Rfl: 3    Coenzyme Q10 (CoQ10) 100 MG CAPS, Take by mouth, Disp: , Rfl:     ibuprofen (MOTRIN) 200 mg tablet, Take 1 tablet by mouth 3 (three) times a day as needed, Disp: , Rfl:     losartan (COZAAR) 50 mg tablet, TAKE 1 TABLET BY MOUTH DAILY, Disp: 90 tablet, Rfl: 3    tamsulosin (FLOMAX) 0.4 mg, Take 2 capsules (0.8 mg total) by mouth daily with dinner, Disp: 180 capsule, Rfl: 3    VITAMIN D PO, Take 2,000 Units by mouth every morning , Disp: , Rfl:     ciclopirox (LOPROX) 0.77 % SUSP, Apply 1 Application topically 2 (two) times a day, Disp: 30 mL, Rfl: 2    Current Allergies     Allergies as of 03/04/2024 - Reviewed 03/04/2024   Allergen Reaction Noted    Acetaminophen Throat Swelling 11/11/2003    Amoxicillin Rash 11/11/2003    Lisinopril Cough 07/12/2021            The following portions of the patient's history were reviewed and updated as appropriate: allergies, current medications, past family history, past medical history, past social history, past surgical history and problem list.     Past Medical History:   Diagnosis Date    Allergic     Arthritis     Benign essential hypertension     Coronary artery disease     Hearing problem     HL (hearing loss)     Hx of CABG     Hypercholesteremia     Hypertension     Wears glasses     as needed    Wears hearing aid     bilateral aids       Past Surgical History:   Procedure Laterality Date    COLONOSCOPY  01/13/2021    CORONARY ARTERY BYPASS GRAFT      triple    EGD      stomach polyp    EYE SURGERY  3/2021    HERNIA REPAIR Bilateral     inguinal    KNEE SURGERY Right     ligament    LASIK  2000    [place] XCAPSL CTRC RMVL INSJ IO LENS PROSTH W/O ECP Left 02/08/2021    Procedure: EXTRACTION EXTRACAPSULAR CATARACT PHACO INTRAOCULAR LENS (IOL);  Surgeon: Yonis Sena MD;  Location: [facility] MAIN OR;  Service:  Ophthalmology    [place] XCAPSL CTRC RMVL INSJ IO LENS PROSTH W/O ECP Right 03/15/2021    Procedure: EXTRACTION EXTRACAPSULAR CATARACT PHACO INTRAOCULAR LENS (IOL);  Surgeon: Yonis Sena MD;  Location: [facility] MAIN OR;  Service: Ophthalmology       Family History   Problem Relation Age of Onset    Hypertension Mother     Dementia Mother     Coronary artery disease Father     Stroke Father     Heart disease Father     Hearing loss Father     Aneurysm Brother         abdominal aortic aneurysm         Medications have been verified.        Objective   /86   Pulse 92   Temp 97.8 °F (36.6 °C) (Temporal)   Resp 18   SpO2 97%   No LMP for male patient.       Physical Exam     Physical Exam  Vitals and nursing note reviewed.   Constitutional:       General: He is not in acute distress.     Appearance: Normal appearance. He is not ill-appearing.   HENT:      Right Ear: Tympanic membrane, ear canal and external ear normal.      Left Ear: Tympanic membrane, ear canal and external ear normal.      Nose: Congestion present.      Mouth/Throat:      Mouth: Mucous membranes are moist.      Pharynx: No oropharyngeal exudate or posterior oropharyngeal erythema.   Cardiovascular:      Rate and Rhythm: Normal rate and regular rhythm.      Heart sounds: Normal heart sounds.   Pulmonary:      Effort: Pulmonary effort is normal.      Breath sounds: Normal breath sounds.   Lymphadenopathy:      Cervical: No cervical adenopathy.   Neurological:      Mental Status: He is alert and oriented to person, place, and time.   Psychiatric:         Mood and Affect: Mood normal.         Behavior: Behavior normal.

## 2024-03-14 ENCOUNTER — TELEPHONE (OUTPATIENT)
Age: 84
End: 2024-03-14

## 2024-03-14 NOTE — TELEPHONE ENCOUNTER
"Jarrett Humphries PA-C        Yes nasal congestion is a common side effect of this medication.  If patient feels like the nasal congestion is worse than the benefit he is getting from a urinary symptom standpoint he can go back down to 1 time a day.  Please call [facility] to inquire and what the clarification is       Call placed to patient and spoke with him. Informed him of the AP recommendations. Pt is aware and will continue to take medication as prescribed as the side effects are not \"bothersome\" at this time.     Call placed to Pharmacy and spoke with staff. Order is up today and they will be shipping out medication this week. No further action required.   "

## 2024-03-14 NOTE — TELEPHONE ENCOUNTER
Patient last seen on 12/12/23 with Dr. Suarez in [place]    Patient calling to see if there is an alternative medication to flomax. He stated since starting the doubled dose, he has developed a cough and runny nose.    Patient also stated [facility] is requesting a call back for clarification on this medication. Their number is [phone]    Patient requesting a call back at [phone]

## 2024-03-18 ENCOUNTER — TELEPHONE (OUTPATIENT)
Dept: CARDIOLOGY CLINIC | Facility: CLINIC | Age: 84
End: 2024-03-18

## 2024-03-18 NOTE — TELEPHONE ENCOUNTER
Patient called he has an appt with you the end of April. He would like to know if you want him to have labs done.

## 2024-03-25 DIAGNOSIS — E78.2 MIXED HYPERLIPIDEMIA: ICD-10-CM

## 2024-03-25 DIAGNOSIS — I10 ESSENTIAL HYPERTENSION: Primary | ICD-10-CM

## 2024-03-25 DIAGNOSIS — I25.10 CAD, MULTIPLE VESSEL: ICD-10-CM

## 2024-04-16 ENCOUNTER — APPOINTMENT (OUTPATIENT)
Dept: LAB | Facility: CLINIC | Age: 84
End: 2024-04-16
Payer: COMMERCIAL

## 2024-04-16 DIAGNOSIS — I10 ESSENTIAL HYPERTENSION: ICD-10-CM

## 2024-04-16 DIAGNOSIS — E78.2 MIXED HYPERLIPIDEMIA: ICD-10-CM

## 2024-04-16 DIAGNOSIS — I25.10 CAD, MULTIPLE VESSEL: ICD-10-CM

## 2024-04-16 LAB
ALBUMIN SERPL BCP-MCNC: 4 G/DL (ref 3.5–5)
ALP SERPL-CCNC: 44 U/L (ref 34–104)
ALT SERPL W P-5'-P-CCNC: 12 U/L (ref 7–52)
ANION GAP SERPL CALCULATED.3IONS-SCNC: 8 MMOL/L (ref 4–13)
AST SERPL W P-5'-P-CCNC: 16 U/L (ref 13–39)
BASOPHILS # BLD AUTO: 0.09 THOUSANDS/ÂΜL (ref 0–0.1)
BASOPHILS NFR BLD AUTO: 1 % (ref 0–1)
BILIRUB SERPL-MCNC: 0.87 MG/DL (ref 0.2–1)
BUN SERPL-MCNC: 22 MG/DL (ref 5–25)
CALCIUM SERPL-MCNC: 8.8 MG/DL (ref 8.4–10.2)
CHLORIDE SERPL-SCNC: 103 MMOL/L (ref 96–108)
CHOLEST SERPL-MCNC: 106 MG/DL
CO2 SERPL-SCNC: 26 MMOL/L (ref 21–32)
CREAT SERPL-MCNC: 1.15 MG/DL (ref 0.6–1.3)
EOSINOPHIL # BLD AUTO: 0.2 THOUSAND/ÂΜL (ref 0–0.61)
EOSINOPHIL NFR BLD AUTO: 3 % (ref 0–6)
ERYTHROCYTE [DISTWIDTH] IN BLOOD BY AUTOMATED COUNT: 13.2 % (ref 11.6–15.1)
GFR SERPL CREATININE-BSD FRML MDRD: 58 ML/MIN/1.73SQ M
GLUCOSE P FAST SERPL-MCNC: 107 MG/DL (ref 65–99)
HCT VFR BLD AUTO: 43.1 % (ref 36.5–49.3)
HDLC SERPL-MCNC: 31 MG/DL
HGB BLD-MCNC: 14 G/DL (ref 12–17)
IMM GRANULOCYTES # BLD AUTO: 0.01 THOUSAND/UL (ref 0–0.2)
IMM GRANULOCYTES NFR BLD AUTO: 0 % (ref 0–2)
LDLC SERPL CALC-MCNC: 57 MG/DL (ref 0–100)
LYMPHOCYTES # BLD AUTO: 1.5 THOUSANDS/ÂΜL (ref 0.6–4.47)
LYMPHOCYTES NFR BLD AUTO: 24 % (ref 14–44)
MCH RBC QN AUTO: 28.7 PG (ref 26.8–34.3)
MCHC RBC AUTO-ENTMCNC: 32.5 G/DL (ref 31.4–37.4)
MCV RBC AUTO: 89 FL (ref 82–98)
MONOCYTES # BLD AUTO: 0.58 THOUSAND/ÂΜL (ref 0.17–1.22)
MONOCYTES NFR BLD AUTO: 9 % (ref 4–12)
NEUTROPHILS # BLD AUTO: 3.83 THOUSANDS/ÂΜL (ref 1.85–7.62)
NEUTS SEG NFR BLD AUTO: 63 % (ref 43–75)
NONHDLC SERPL-MCNC: 75 MG/DL
NRBC BLD AUTO-RTO: 0 /100 WBCS
PLATELET # BLD AUTO: 258 THOUSANDS/UL (ref 149–390)
PMV BLD AUTO: 11.7 FL (ref 8.9–12.7)
POTASSIUM SERPL-SCNC: 4.1 MMOL/L (ref 3.5–5.3)
PROT SERPL-MCNC: 6.8 G/DL (ref 6.4–8.4)
RBC # BLD AUTO: 4.87 MILLION/UL (ref 3.88–5.62)
SODIUM SERPL-SCNC: 137 MMOL/L (ref 135–147)
TRIGL SERPL-MCNC: 89 MG/DL
TSH SERPL DL<=0.05 MIU/L-ACNC: 1.05 UIU/ML (ref 0.45–4.5)
WBC # BLD AUTO: 6.21 THOUSAND/UL (ref 4.31–10.16)

## 2024-04-16 PROCEDURE — 80061 LIPID PANEL: CPT

## 2024-04-16 PROCEDURE — 84443 ASSAY THYROID STIM HORMONE: CPT

## 2024-04-16 PROCEDURE — 80053 COMPREHEN METABOLIC PANEL: CPT

## 2024-04-16 PROCEDURE — 85025 COMPLETE CBC W/AUTO DIFF WBC: CPT

## 2024-04-16 PROCEDURE — 36415 COLL VENOUS BLD VENIPUNCTURE: CPT

## 2024-04-17 ENCOUNTER — TELEPHONE (OUTPATIENT)
Dept: CARDIOLOGY CLINIC | Facility: CLINIC | Age: 84
End: 2024-04-17

## 2024-04-17 NOTE — TELEPHONE ENCOUNTER
----- Message from Gregory Penaloza DO sent at 4/17/2024 12:40 PM EDT -----  Can you please let the patient know blood work was normal

## 2024-04-26 ENCOUNTER — OFFICE VISIT (OUTPATIENT)
Dept: CARDIOLOGY CLINIC | Facility: CLINIC | Age: 84
End: 2024-04-26
Payer: COMMERCIAL

## 2024-04-26 VITALS
HEART RATE: 71 BPM | DIASTOLIC BLOOD PRESSURE: 60 MMHG | WEIGHT: 155 LBS | HEIGHT: 66 IN | SYSTOLIC BLOOD PRESSURE: 102 MMHG | BODY MASS INDEX: 24.91 KG/M2 | OXYGEN SATURATION: 99 %

## 2024-04-26 DIAGNOSIS — E78.2 MIXED HYPERLIPIDEMIA: ICD-10-CM

## 2024-04-26 DIAGNOSIS — Z15.89 MTHFR MUTATION: ICD-10-CM

## 2024-04-26 DIAGNOSIS — E78.5 HYPERLIPIDEMIA, UNSPECIFIED HYPERLIPIDEMIA TYPE: ICD-10-CM

## 2024-04-26 DIAGNOSIS — I25.10 CAD, MULTIPLE VESSEL: ICD-10-CM

## 2024-04-26 DIAGNOSIS — Z95.1 S/P CABG X 4: ICD-10-CM

## 2024-04-26 DIAGNOSIS — I10 ESSENTIAL HYPERTENSION: Primary | ICD-10-CM

## 2024-04-26 PROCEDURE — 99214 OFFICE O/P EST MOD 30 MIN: CPT | Performed by: INTERNAL MEDICINE

## 2024-04-26 NOTE — PROGRESS NOTES
Kristopher Nunez  1940  [id number]      Interval History:  Kristopher Nunez is a 83 y.o. male who presents for follow up of coronary artery disease.  Since his last visit, he has been feeling well.  he denies any palpitations, chest pain, shortness of breath, LE edema, orthopnea or PND.   Diet is overall unchanged.  There has not been a significant change in weight.    He exercises with a treadmill 6 days a week/40 minutes a day.      His last blood work was normal.  His last LDL cholesterol was 57 mg/dL.  HDL was low at 31 mg/dL    Previously, he underwent coronary artery bypass grafting on January 15, 2015. He had LIMA to LAD, SVG to PDA, SVG to OM. Catheterization was done prior to this for evaluation of chest pain. He had a 70% left main stenosis, 60% LAD stenosis, 80% circumflex disease and 90% PDA stenosis. Surgery had no complications.     Cardiac risk factors include advanced age (older than 55 for men, 65 for women), dyslipidemia and male gender.      Past Medical History:   Diagnosis Date    Allergic     Arthritis     Benign essential hypertension     Coronary artery disease     Hearing problem     HL (hearing loss)     Hx of CABG     Hypercholesteremia     Hypertension     Wears glasses     as needed    Wears hearing aid     bilateral aids     Past Surgical History:   Procedure Laterality Date    COLONOSCOPY  01/13/2021    CORONARY ARTERY BYPASS GRAFT      triple    EGD      stomach polyp    EYE SURGERY  3/2021    HERNIA REPAIR Bilateral     inguinal    KNEE SURGERY Right     ligament    LASIK  2000    RI XCAPSL CTRC RMVL INSJ IO LENS PROSTH W/O ECP Left 02/08/2021    Procedure: EXTRACTION EXTRACAPSULAR CATARACT PHACO INTRAOCULAR LENS (IOL);  Surgeon: Yonis Sena MD;  Location: [facility] MAIN OR;  Service: Ophthalmology    RI XCAPSL CTRC RMVL INSJ IO LENS PROSTH W/O ECP Right 03/15/2021    Procedure: EXTRACTION EXTRACAPSULAR CATARACT PHACO INTRAOCULAR LENS (IOL);  Surgeon: Yonis Sena MD;   Location: [facility] MAIN OR;  Service: Ophthalmology     Social History     Socioeconomic History    Marital status: /Civil Union     Spouse name: Not on file    Number of children: Not on file    Years of education: Not on file    Highest education level: Not on file   Occupational History    Not on file   Tobacco Use    Smoking status: Former     Current packs/day: 0.00     Average packs/day: 1 pack/day for 12.8 years (12.8 ttl pk-yrs)     Types: Cigarettes     Start date: 1957     Quit date: 1970     Years since quittin.0    Smokeless tobacco: Never   Vaping Use    Vaping status: Never Used   Substance and Sexual Activity    Alcohol use: Not Currently     Comment: quit     Drug use: Never    Sexual activity: Not on file   Other Topics Concern    Not on file   Social History Narrative    Not on file     Social Determinants of Health     Financial Resource Strain: Low Risk  (2023)    Overall Financial Resource Strain (CARDIA)     Difficulty of Paying Living Expenses: Not very hard   Food Insecurity: Not on file   Transportation Needs: No Transportation Needs (2023)    PRAPARE - Transportation     Lack of Transportation (Medical): No     Lack of Transportation (Non-Medical): No   Physical Activity: Not on file   Stress: Not on file   Social Connections: Not on file   Intimate Partner Violence: Not on file   Housing Stability: Not on file     Family History   Problem Relation Age of Onset    Hypertension Mother     Dementia Mother     Coronary artery disease Father     Stroke Father     Heart disease Father     Hearing loss Father     Aneurysm Brother         abdominal aortic aneurysm       Current Outpatient Medications:     aspirin 81 MG tablet, Take 1 tablet by mouth daily at bedtime , Disp: , Rfl:     atorvastatin (LIPITOR) 10 mg tablet, TAKE 1 TABLET BY MOUTH  DAILY, Disp: 90 tablet, Rfl: 3    ciclopirox (LOPROX) 0.77 % SUSP, Apply 1 Application topically 2 (two) times a day,  "Disp: 30 mL, Rfl: 2    Coenzyme Q10 (CoQ10) 100 MG CAPS, Take by mouth, Disp: , Rfl:     ibuprofen (MOTRIN) 200 mg tablet, Take 1 tablet by mouth 3 (three) times a day as needed, Disp: , Rfl:     losartan (COZAAR) 50 mg tablet, TAKE 1 TABLET BY MOUTH DAILY, Disp: 90 tablet, Rfl: 3    tamsulosin (FLOMAX) 0.4 mg, Take 2 capsules (0.8 mg total) by mouth daily with dinner, Disp: 180 capsule, Rfl: 3    VITAMIN D PO, Take 2,000 Units by mouth every morning , Disp: , Rfl:   The following portions of the patient's history were reviewed and updated as appropriate: allergies, current medications, past family history, past medical history, past social history, past surgical history and problem list..    Review of Systems:  Review of Systems   Respiratory:  Negative for shortness of breath.    Cardiovascular:  Negative for chest pain, palpitations and leg swelling.   Musculoskeletal:  Positive for arthralgias.   All other systems reviewed and are negative.      Physical Exam:  /60 (BP Location: Right arm, Patient Position: Sitting, Cuff Size: Large)   Pulse 71   Ht 5' 6\" (1.676 m)   Wt 70.3 kg (155 lb) Comment: per patient  SpO2 99%   BMI 25.02 kg/m²   Physical Exam  Constitutional:       General: He is not in acute distress.     Appearance: Normal appearance. He is well-developed. He is not diaphoretic.   HENT:      Head: Normocephalic and atraumatic.   Eyes:      General: No scleral icterus.     Conjunctiva/sclera: Conjunctivae normal.      Pupils: Pupils are equal, round, and reactive to light.   Neck:      Thyroid: No thyromegaly.      Vascular: No JVD.   Cardiovascular:      Rate and Rhythm: Normal rate and regular rhythm.      Heart sounds: Normal heart sounds. No murmur heard.     No friction rub. No gallop.   Pulmonary:      Effort: Pulmonary effort is normal.      Breath sounds: Normal breath sounds.   Musculoskeletal:      Cervical back: Neck supple.      Right lower leg: No edema.      Left lower leg: No " edema.   Skin:     General: Skin is warm and dry.      Findings: No erythema or rash.   Neurological:      General: No focal deficit present.      Mental Status: He is alert and oriented to person, place, and time. Mental status is at baseline.   Psychiatric:         Mood and Affect: Mood normal.         Behavior: Behavior normal.         Thought Content: Thought content normal.         Judgment: Judgment normal.       Cardiographics  ECG: normal sinus rhythm, incomplete RBBB .  LV Ejection Fraction: LV ejection fraction >= 40%.    Imaging:No results found.    Lab Review  Lab Results   Component Value Date    CHOL 160 01/14/2015    TRIG 89 04/16/2024    TRIG 92 11/21/2023    TRIG 139 05/04/2023    TRIG 100 06/01/2020    TRIG 131 01/14/2015    HDL 31 (L) 04/16/2024    HDL 31 (L) 11/21/2023    HDL 35 (L) 05/04/2023    HDL 31 (L) 06/01/2020    HDL 28 01/14/2015     Appointment on 04/16/2024   Component Date Value    Sodium 04/16/2024 137     Potassium 04/16/2024 4.1     Chloride 04/16/2024 103     CO2 04/16/2024 26     ANION GAP 04/16/2024 8     BUN 04/16/2024 22     Creatinine 04/16/2024 1.15     Glucose, Fasting 04/16/2024 107 (H)     Calcium 04/16/2024 8.8     AST 04/16/2024 16     ALT 04/16/2024 12     Alkaline Phosphatase 04/16/2024 44     Total Protein 04/16/2024 6.8     Albumin 04/16/2024 4.0     Total Bilirubin 04/16/2024 0.87     eGFR 04/16/2024 58     Cholesterol 04/16/2024 106     Triglycerides 04/16/2024 89     HDL, Direct 04/16/2024 31 (L)     LDL Calculated 04/16/2024 57     Non-HDL-Chol (CHOL-HDL) 04/16/2024 75     WBC 04/16/2024 6.21     RBC 04/16/2024 4.87     Hemoglobin 04/16/2024 14.0     Hematocrit 04/16/2024 43.1     MCV 04/16/2024 89     MCH 04/16/2024 28.7     MCHC 04/16/2024 32.5     RDW 04/16/2024 13.2     MPV 04/16/2024 11.7     Platelets 04/16/2024 258     nRBC 04/16/2024 0     Segmented % 04/16/2024 63     Immature Grans % 04/16/2024 0     Lymphocytes % 04/16/2024 24     Monocytes %  04/16/2024 9     Eosinophils Relative 04/16/2024 3     Basophils Relative 04/16/2024 1     Absolute Neutrophils 04/16/2024 3.83     Absolute Immature Grans 04/16/2024 0.01     Absolute Lymphocytes 04/16/2024 1.50     Absolute Monocytes 04/16/2024 0.58     Eosinophils Absolute 04/16/2024 0.20     Basophils Absolute 04/16/2024 0.09     TSH 3RD GENERATON 04/16/2024 1.051      Assessment/Plan     1. Essential hypertension    2. CAD, multiple vessel    3. Mixed hyperlipidemia    4. Hyperlipidemia, unspecified hyperlipidemia type    5. S/P CABG x 4    6. MTHFR mutation        -  Patient feeling well.  Exercising regularly.    - Last blood work was good.  Lipid levels at goal.  Has blood work ordered by Dr. Lujan.    - Continue  Losartan 50 mg daily.   - Continue atorvastatin

## 2024-05-30 ENCOUNTER — RA CDI HCC (OUTPATIENT)
Dept: OTHER | Facility: HOSPITAL | Age: 84
End: 2024-05-30

## 2024-06-06 ENCOUNTER — OFFICE VISIT (OUTPATIENT)
Dept: FAMILY MEDICINE CLINIC | Facility: CLINIC | Age: 84
End: 2024-06-06
Payer: COMMERCIAL

## 2024-06-06 ENCOUNTER — LAB (OUTPATIENT)
Dept: LAB | Facility: CLINIC | Age: 84
End: 2024-06-06
Payer: COMMERCIAL

## 2024-06-06 VITALS
TEMPERATURE: 97.6 F | DIASTOLIC BLOOD PRESSURE: 66 MMHG | HEIGHT: 66 IN | SYSTOLIC BLOOD PRESSURE: 130 MMHG | OXYGEN SATURATION: 99 % | HEART RATE: 73 BPM | WEIGHT: 156.2 LBS | RESPIRATION RATE: 18 BRPM | BODY MASS INDEX: 25.1 KG/M2

## 2024-06-06 DIAGNOSIS — I10 ESSENTIAL HYPERTENSION: ICD-10-CM

## 2024-06-06 DIAGNOSIS — R55 SYNCOPE, UNSPECIFIED SYNCOPE TYPE: Primary | ICD-10-CM

## 2024-06-06 DIAGNOSIS — I25.119 CORONARY ARTERY DISEASE INVOLVING NATIVE CORONARY ARTERY OF NATIVE HEART WITH ANGINA PECTORIS (HCC): ICD-10-CM

## 2024-06-06 DIAGNOSIS — R35.0 URINE FREQUENCY: ICD-10-CM

## 2024-06-06 DIAGNOSIS — F33.9 DEPRESSION, RECURRENT (HCC): ICD-10-CM

## 2024-06-06 DIAGNOSIS — R73.09 ABNORMAL GLUCOSE: ICD-10-CM

## 2024-06-06 DIAGNOSIS — H35.30 MACULAR DEGENERATION OF LEFT EYE, UNSPECIFIED TYPE: ICD-10-CM

## 2024-06-06 DIAGNOSIS — E72.12 METHYLENETETRAHYDROFOLATE REDUCTASE DEFICIENCY (HCC): ICD-10-CM

## 2024-06-06 LAB
CREAT UR-MCNC: 26.9 MG/DL
MICROALBUMIN UR-MCNC: <7 MG/L
MICROALBUMIN/CREAT 24H UR: <26 MG/G CREATININE (ref 0–30)

## 2024-06-06 PROCEDURE — 82043 UR ALBUMIN QUANTITATIVE: CPT

## 2024-06-06 PROCEDURE — 83036 HEMOGLOBIN GLYCOSYLATED A1C: CPT

## 2024-06-06 PROCEDURE — 36415 COLL VENOUS BLD VENIPUNCTURE: CPT

## 2024-06-06 PROCEDURE — 82570 ASSAY OF URINE CREATININE: CPT

## 2024-06-06 PROCEDURE — G2211 COMPLEX E/M VISIT ADD ON: HCPCS | Performed by: FAMILY MEDICINE

## 2024-06-06 PROCEDURE — 99214 OFFICE O/P EST MOD 30 MIN: CPT | Performed by: FAMILY MEDICINE

## 2024-06-06 RX ORDER — TAMSULOSIN HYDROCHLORIDE 0.4 MG/1
0.4 CAPSULE ORAL
Qty: 90 CAPSULE | Refills: 1 | Status: SHIPPED | OUTPATIENT
Start: 2024-06-06

## 2024-06-06 NOTE — PROGRESS NOTES
Kristopher Nunez 1940 male MRN: [id number]    [facility] OFFICE VISIT  [facility]      ASSESSMENT/PLAN  Kristopher Nunez is a 83 y.o. 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  Recommend continuing monitoring blood pressures at home.  No changes to be made today.  Recommend finding out which type of macular degeneration he has.  Will send for all screening labs shown above.  From a depression standpoint the patient declines medication and would like to continue self-motivation.  Continue recommendations per urology             Future Appointments   Date Time Provider Department Center   6/11/2024  9:45 AM Cm Suarez MD [facility]   8/5/2024 10:00 AM Mirlande MARTINEZ OP [facility] [address]   11/8/2024 10:00 AM DO ROSITA [facility]   12/6/2024 11:00 AM Tita Rashid MD [facility]   12/11/2024 10:20 AM Abdulaziz Hernandez MD HEM ONC [facility]          SUBJECTIVE  CC:       HPI:  Kristopher Nunez is a 83 y.o. male who presents for an chronic f/u. Patient had a history of a syncopal event.  Believes that it was likely secondary to blood pressure.  Has been monitoring his blood pressure and has been within normal limits.  Patient is seen by the urologist who decreased his Flomax  down to 1 tablet.  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  Patient has a history of coronary artery disease being monitored by the cardiologist.  Patient states per his insurance he should have an A1c and screening for albumin.  Patient is following the ophthalmologist for his history of macular degeneration.  Unsure which type he has  Review of Systems   Constitutional:  Negative for activity change, appetite change, chills, fatigue and fever.   HENT:  Negative for congestion.    Respiratory:  Negative for cough, chest tightness and shortness of breath.    Cardiovascular:  Negative for chest pain and leg swelling.   Gastrointestinal:  Negative for abdominal distention, abdominal pain, constipation, diarrhea, nausea and vomiting.   All other systems reviewed and are negative.      Historical Information   The patient history was reviewed as follows:  Past Medical History:   Diagnosis Date   • Allergic    • Arthritis    • Benign essential hypertension    • Coronary artery disease    • Hearing problem    • HL (hearing loss)    • Hx of CABG    • Hypercholesteremia    • Hypertension    • Wears glasses     as needed   • Wears hearing aid     bilateral aids         Medications:     Current Outpatient Medications:   •  aspirin 81 MG tablet, Take 1 tablet by mouth daily at bedtime , Disp: , Rfl:   •  atorvastatin (LIPITOR) 10 mg tablet, TAKE 1 TABLET BY MOUTH  DAILY, Disp: 90 tablet, Rfl: 3  •  Coenzyme Q10 (CoQ10) 100 MG CAPS, Take by mouth, Disp: , Rfl:   •  ibuprofen (MOTRIN) 200 mg tablet, Take 1 tablet by mouth 3 (three) times a day as needed, Disp: , Rfl:   •  losartan (COZAAR) 50 mg tablet, TAKE 1 TABLET BY MOUTH DAILY, Disp: 90 tablet, Rfl: 3  •  tamsulosin (FLOMAX) 0.4 mg, Take 1 capsule (0.4 mg total) by mouth daily with dinner, Disp: 90 capsule, Rfl: 1  •  VITAMIN D PO, Take 2,000 Units by mouth every morning , Disp: , Rfl:   •   "ciclopirox (LOPROX) 0.77 % SUSP, Apply 1 Application topically 2 (two) times a day (Patient not taking: Reported on 6/6/2024), Disp: 30 mL, Rfl: 2    Allergies   Allergen Reactions   • Acetaminophen Throat Swelling   • Amoxicillin Rash   • Lisinopril Cough       OBJECTIVE  Vitals:   Vitals:    06/06/24 1028   BP: 130/66   BP Location: Left arm   Patient Position: Sitting   Cuff Size: Standard   Pulse: 73   Resp: 18   Temp: 97.6 °F (36.4 °C)   TempSrc: Temporal   SpO2: 99%   Weight: 70.9 kg (156 lb 3.2 oz)   Height: 5' 6\" (1.676 m)         Physical Exam  Vitals reviewed.   Constitutional:       Appearance: He is well-developed.   HENT:      Head: Normocephalic and atraumatic.   Eyes:      Extraocular Movements: EOM normal.      Conjunctiva/sclera: Conjunctivae normal.      Pupils: Pupils are equal, round, and reactive to light.   Cardiovascular:      Rate and Rhythm: Normal rate and regular rhythm.      Heart sounds: Normal heart sounds, S1 normal and S2 normal. No murmur heard.  Pulmonary:      Effort: Pulmonary effort is normal. No respiratory distress.      Breath sounds: Normal breath sounds. No wheezing.   Musculoskeletal:         General: No edema. Normal range of motion.      Cervical back: Normal range of motion and neck supple.   Skin:     General: Skin is warm.   Neurological:      Mental Status: He is alert and oriented to person, place, and time.   Psychiatric:         Mood and Affect: Mood and affect normal.         Speech: Speech normal.         Behavior: Behavior normal.         Thought Content: Thought content normal.         Judgment: Judgment normal.                    Tita Escalante MD,   [facility]  6/7/2024      Answers submitted by the patient for this visit:  Medicare Annual Wellness Visit (Submitted on 6/6/2024)  How would you rate your overall health?: good  Compared to last year, how is your physical health?: same  In general, how satisfied are you with your " life?: very satisfied  Compared to last year, how is your eyesight?: same  Compared to last year, how is your hearing?: same  Compared to last year, how is your emotional/mental health?: same  How often is anger a problem for you?: never, rarely  How often do you feel unusually tired/fatigued?: sometimes  In the past 7 days, how much pain have you experienced?: none  In the past 6 months, have you lost or gained 10 pounds without trying?: No  One or more falls in the last year: Yes  Do you have trouble with the stairs inside or outside your home?: No  Does your home have working smoke alarms?: Yes  Does your home have a carbon monoxide monitor?: Yes  Which safety hazards (if any) have you experienced in your home? Please select all that apply.: none  How would you describe your current diet? Please select all that apply.: Regular, No Added Salt, Limited junk food  In addition to prescription medications, are you taking any over-the-counter supplements?: No  Can you manage your medications?: Yes  Are you currently taking any opioid medications?: No  Can you walk and transfer into and out of your bed and chair?: Yes  Can you dress and groom yourself?: Yes  Can you bathe or shower yourself?: Yes  Can you feed yourself?: Yes  Can you do your laundry/ housekeeping?: Yes  Can you manage your money, pay your bills, and track your expenses?: Yes  Can you make your own meals?: Yes  Can you do your own shopping?: Yes  Within the last 12 months, have you had any hospitalizations or Emergency Department visits?: No  Do you have a living will?: Yes  Do you have a Durable POA (Power of ) for healthcare decisions?: Yes  Do you have an Advanced Directive for end of life decisions?: Yes  How often have you used an illegal drug (including marijuana) or a prescription medication for non-medical reasons in the past year?: never  What is the typical number of drinks you consume in a day?: 0  What is the typical number of drinks  you consume in a week?: 0  How often did you have a drink containing alcohol in the past year?: never  How many drinks did you have on a typical day  when you were drinking in the past year?: 3 to 4  How often did you have 6 or more drinks on one occasion in the past year?: never

## 2024-06-06 NOTE — PROGRESS NOTES
Ambulatory Visit  Name: Kristopher Nunez      : 1940      MRN: [id number]  Encounter Provider: Tita Escalante MD  Encounter Date: 2024   Encounter department: [facility]    Assessment & Plan   1. Syncope, unspecified syncope type  2. Essential hypertension  3. Urine frequency  -     tamsulosin (FLOMAX) 0.4 mg; Take 1 capsule (0.4 mg total) by mouth daily with dinner  4. Depression, recurrent (HCC)  5. Methylenetetrahydrofolate reductase deficiency (HCC)  6. Coronary artery disease involving native coronary artery of native heart with angina pectoris (HCC)  7. Macular degeneration of left eye, unspecified type  8. Abnormal glucose  -     Hemoglobin A1C; Future  -     Albumin / creatinine urine ratio; Future     Preventive health issues were discussed with patient, and age appropriate screening tests were ordered as noted in patient's After Visit Summary. Personalized health advice and appropriate referrals for health education or preventive services given if needed, as noted in patient's After Visit Summary.    History of Present Illness   {Disappearing Hyperlinks I Encounters * My Last Note * Since Last Visit * History :98507}  [facility]   Patient Care Team:  Tita Rashid MD as PCP - General (Family Medicine)  MD Abdulaziz Mcgregor MD J Raymond Fitzpatrick, MD Navtej Brar, DO    Review of Systems  Medical History Reviewed by provider this encounter:       Annual Wellness Visit Questionnaire       Health Risk Assessment:   Patient rates overall health as good. Patient feels that their physical health rating is same. Patient is very satisfied with their life. Eyesight was rated as same. Hearing was rated as same. Patient feels that their emotional and mental health rating is same. Patients states they are never, rarely angry. Patient states they are sometimes unusually tired/fatigued. Pain experienced in the last 7 days has been none. Patient states that he has  experienced no weight loss or gain in last 6 months.     Depression Screening:   PHQ-9 Score: 3      Fall Risk Screening:   In the past year, patient has experienced: history of falling in past year    Number of falls: 1  Injured during fall?: No    Feels unsteady when standing or walking?: No    Worried about falling?: No      Home Safety:  Patient does not have trouble with stairs inside or outside of their home. Patient has working smoke alarms and has working carbon monoxide detector. Home safety hazards include: none.     Nutrition:   Current diet is Regular, No Added Salt and Limited junk food.     Medications:   Patient is not currently taking any over-the-counter supplements. Patient is able to manage medications.     Activities of Daily Living (ADLs)/Instrumental Activities of Daily Living (IADLs):   Walk and transfer into and out of bed and chair?: Yes  Dress and groom yourself?: Yes    Bathe or shower yourself?: Yes    Feed yourself? Yes  Do your laundry/housekeeping?: Yes  Manage your money, pay your bills and track your expenses?: Yes  Make your own meals?: Yes    Do your own shopping?: Yes    Previous Hospitalizations:   Any hospitalizations or ED visits within the last 12 months?: No      Advance Care Planning:   Living will: Yes    Durable POA for healthcare: Yes    Advanced directive: Yes      PREVENTIVE SCREENINGS      Cardiovascular Screening:    General: Screening Not Indicated and History Lipid Disorder      Diabetes Screening:     General: Screening Current      Colorectal Cancer Screening:     General: Screening Current      Prostate Cancer Screening:    General: Screening Not Indicated      Abdominal Aortic Aneurysm (AAA) Screening:    Risk factors include: tobacco use        Lung Cancer Screening:     General: Screening Not Indicated    Screening, Brief Intervention, and Referral to Treatment (SBIRT)    Screening  Typical number of drinks in a day: 0  Typical number of drinks in a week:  "0  Interpretation: Low risk drinking behavior.    AUDIT-C Screenin) How often did you have a drink containing alcohol in the past year? never  2) How many drinks did you have on a typical day when you were drinking in the past year? 3 to 4  3) How often did you have 6 or more drinks on one occasion in the past year? never    AUDIT-C Score: 0  Interpretation: Score 0-3 (male): Negative screen for alcohol misuse    Single Item Drug Screening:  How often have you used an illegal drug (including marijuana) or a prescription medication for non-medical reasons in the past year? never    Single Item Drug Screen Score: 0  Interpretation: Negative screen for possible drug use disorder    Social Determinants of Health     Financial Resource Strain: Low Risk  (2023)    Overall Financial Resource Strain (CARDIA)    • Difficulty of Paying Living Expenses: Not very hard   Food Insecurity: No Food Insecurity (2024)    Hunger Vital Sign    • Worried About Running Out of Food in the Last Year: Never true    • Ran Out of Food in the Last Year: Never true   Transportation Needs: No Transportation Needs (2024)    PRAPARE - Transportation    • Lack of Transportation (Medical): No    • Lack of Transportation (Non-Medical): No   Housing Stability: Low Risk  (2024)    Housing Stability Vital Sign    • Unable to Pay for Housing in the Last Year: No    • Number of Times Moved in the Last Year: 0    • Homeless in the Last Year: No   Utilities: Not At Risk (2024)    [facility] Utilities    • Threatened with loss of utilities: No     No results found.    Objective   {Disappearing Hyperlinks   Review Vitals * Enter New Vitals * Results Review * Labs * Imaging * Cardiology * Procedures * Lung Cancer Screening :92789}  /66 (BP Location: Left arm, Patient Position: Sitting, Cuff Size: Standard)   Pulse 73   Temp 97.6 °F (36.4 °C) (Temporal)   Resp 18   Ht 5' 6\" (1.676 m)   Wt 70.9 kg (156 lb 3.2 oz)   SpO2 99%   BMI " 25.21 kg/m²     Physical Exam  Administrative Statements {Disappearing Hyperlinks I  Level of Service * PCMH/PCSP:64178}  {Time Spent Statement (Optional):23697}

## 2024-06-07 ENCOUNTER — TELEPHONE (OUTPATIENT)
Dept: FAMILY MEDICINE CLINIC | Facility: CLINIC | Age: 84
End: 2024-06-07

## 2024-06-07 LAB
EST. AVERAGE GLUCOSE BLD GHB EST-MCNC: 128 MG/DL
HBA1C MFR BLD: 6.1 %

## 2024-06-07 NOTE — TELEPHONE ENCOUNTER
----- Message from Tita Rashid MD sent at 6/7/2024  8:41 AM EDT -----  Please advise the patient as we discussed he is a prediabetic.  Continue to monitor sugar intake.  Urine testing for diabetes was within normal limits

## 2024-06-11 ENCOUNTER — OFFICE VISIT (OUTPATIENT)
Dept: UROLOGY | Facility: CLINIC | Age: 84
End: 2024-06-11
Payer: COMMERCIAL

## 2024-06-11 VITALS
WEIGHT: 155 LBS | OXYGEN SATURATION: 98 % | SYSTOLIC BLOOD PRESSURE: 120 MMHG | HEART RATE: 72 BPM | DIASTOLIC BLOOD PRESSURE: 60 MMHG | HEIGHT: 66 IN | BODY MASS INDEX: 24.91 KG/M2

## 2024-06-11 DIAGNOSIS — N40.1 BPH WITH OBSTRUCTION/LOWER URINARY TRACT SYMPTOMS: Primary | ICD-10-CM

## 2024-06-11 DIAGNOSIS — N13.8 BPH WITH OBSTRUCTION/LOWER URINARY TRACT SYMPTOMS: Primary | ICD-10-CM

## 2024-06-11 LAB — POST-VOID RESIDUAL VOLUME, ML POC: 64 ML

## 2024-06-11 PROCEDURE — 51798 US URINE CAPACITY MEASURE: CPT | Performed by: UROLOGY

## 2024-06-11 PROCEDURE — 99214 OFFICE O/P EST MOD 30 MIN: CPT | Performed by: UROLOGY

## 2024-06-11 RX ORDER — FINASTERIDE 5 MG/1
5 TABLET, FILM COATED ORAL DAILY
Qty: 90 TABLET | Refills: 3 | Status: SHIPPED | OUTPATIENT
Start: 2024-06-11 | End: 2025-06-06

## 2024-06-11 NOTE — PROGRESS NOTES
Kristopher Nunez is a(n) 83 y.o. male. 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  No significant benefit from Flomax twice daily so back to once a day.  The dizziness improved when he changed his blood pressure medication to the evening.    Plan: He is willing to continue the Flomax once daily.  Aware that he could always double the dose but he is concerned about constipation.  The only way to know for sure is to double the dose and see what happens.  Could take it twice daily.  We also discussed adding finasteride 5 mg daily.  We discussed some side effects such as sexual dysfunction and breast enlargement or nipple tenderness.  He is willing to have the medicine called in to the [facility] prescription plan and try it for a bit to see if he has side effects.  It might actually just shrink the prostate nicely and have no side effects.  I will arrange to see him back in 6 months.  He will call sooner if he needs sooner follow-up.     Radiology  none     History  BPH - nocturia.  On flomax.  Once daily.  Tried twice daily got constipation.  Finasteride trial 2024    Prior Visits  2023  83-year-old gentleman with obstructive BPH symptoms for the last couple of years.  Started tamsulosin in May 2023 by his primary care.  No hematuria or dysuria.  No urinary infections.  Family history of bladder cancer in his brother who was a pipe smoker.  No recent ALEXANDRA.  Prostate exam reveals mild enlargement of the prostate.  He gets a little lightheaded after his workout in the morning.  He then takes his blood pressure medication.  He does not take his tamsulosin until the evening time.  He is willing to double the dose.  He will call if this does not improve his symptoms at all.  We can always pursue uroflow and cystoscopy if that is the case otherwise we will just see him back in 6 months for a PVR. Consider finasteride.  SED.  Will  "call if not better and can prescribe.     Patient Instructions   We will double the dose of Flomax in the evening.  Call if you get side effects such as lightheadedness or dizziness.  If after several weeks you are not improved then we could proceed with finasteride which will shrink the prostate.  That might take a few months to work.  Has side effects as we discussed.  Otherwise we will just see you back to check your bladder in 6 months to make sure that you are emptying appropriately.    6/11/2024 MYRON Daniela  No significant benefit from Flomax twice daily so back to once a day.  The dizziness improved when he changed his blood pressure medication to the evening.    Plan: He is willing to continue the Flomax once daily.  Aware that he could always double the dose but he is concerned about constipation.  The only way to know for sure is to double the dose and see what happens.  Could take it twice daily.  We also discussed adding finasteride 5 mg daily.  We discussed some side effects such as sexual dysfunction and breast enlargement or nipple tenderness.  He is willing to have the medicine called in to the [facility] prescription plan and try it for a bit to see if he has side effects.  It might actually just shrink the prostate nicely and have no side effects.  I will arrange to see him back in 6 months.  He will call sooner if he needs sooner follow-up.    Review of Systems    Lab Results   Component Value Date    PSA 1.06 11/21/2023    PSA 1.0 11/28/2022    PSA 1.2 12/02/2021     No results found for: \"TESTOSTERONE\"  No components found for: \"CR\"  No results found for: \"HBA1C\"    Objective     /60 (BP Location: Left arm, Patient Position: Sitting, Cuff Size: Adult)   Pulse 72   Ht 5' 6\" (1.676 m)   Wt 70.3 kg (155 lb)   SpO2 98%   BMI 25.02 kg/m²     Physical Exam      Cm Daniela, [facility]  "

## 2024-06-14 ENCOUNTER — TELEPHONE (OUTPATIENT)
Age: 84
End: 2024-06-14

## 2024-06-14 NOTE — TELEPHONE ENCOUNTER
Pt was given a new medication by urology and will not take it until PCP says it is ok.  He read all the side effects and would like your opinion.

## 2024-06-14 NOTE — TELEPHONE ENCOUNTER
Spoke to the patient advised him that I just want him to monitor his blood pressure.  If the patient has any side effects to please notify me immediately.  Patient agrees with my plan

## 2024-07-14 DIAGNOSIS — E78.5 HYPERLIPIDEMIA, UNSPECIFIED HYPERLIPIDEMIA TYPE: ICD-10-CM

## 2024-07-14 RX ORDER — ATORVASTATIN CALCIUM 10 MG/1
TABLET, FILM COATED ORAL
Qty: 100 TABLET | Refills: 1 | Status: SHIPPED | OUTPATIENT
Start: 2024-07-14

## 2024-08-05 ENCOUNTER — OFFICE VISIT (OUTPATIENT)
Dept: AUDIOLOGY | Facility: CLINIC | Age: 84
End: 2024-08-05
Payer: COMMERCIAL

## 2024-08-05 DIAGNOSIS — H90.3 SENSORY HEARING LOSS, BILATERAL: Primary | ICD-10-CM

## 2024-08-05 PROCEDURE — 92557 COMPREHENSIVE HEARING TEST: CPT | Performed by: AUDIOLOGIST

## 2024-08-07 NOTE — PROGRESS NOTES
Diagnostic Hearing Evaluation    Name:  Kristopher Nunez  :  1940  Age:  83 y.o.   MRN:  [id number]  Date of Evaluation:  2024    HISTORY:     Reason for visit:   Annual evaluation     Kristopher Nunez is being seen today at the request of Dr. Boris Rashid for an annual evaluation of hearing. The patient reports no significant changes in medical status from last visit.  Patient denies otalgia, fullness, tinnitus, and notes a positive history of dizziness with blood pressure issues.       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  The right ear word recognition score is reduced, most likely due to the word list presented today (more difficult with high frequency sounds)      RECOMMENDATIONS:  Annual hearing evaluations due to past history and asymmetry.       PATIENT EDUCATION:   The results of today's results and recommendations were reviewed with the patient and his hearing thresholds were explained at length. Treatment options, including amplification and communication strategies, were discussed as appropriate. The patient voiced understanding of his test results. Questions were addressed and the patient was encouraged to contact our department should concerns arise.      Janes Moss, JOSE-A  Clinical  Audiologist  [id number]  [phone]  [facility] AUDIOLOGY  [address] [address]

## 2024-08-07 NOTE — PROGRESS NOTES
Hearing Aid Visit:    Name:  Kristopher Nunez  :  1940  Age:  83 y.o.  Date of Evaluation:  24    Kristopher Nunez is being seen for a hearing aid visit in conjunction with an annual hearing evaluation.     Kristopher Nunez is fit with [employer] OPN 3 minirite T hearing aid(s).   Right serial number [id number]. Left serial number [id number].   Warranty date: OOW 2020 (Loss/Damage and repair).      Patient reports daily use of the devices.     Action:  1. Cleaned and checked both devices and earmolds.     2. Redux treatment removed 3.3 units of moisture from both devices   3. Found to be in good working order via listening check.      Recommendations:   1. Patient has chosen to contact us when needed for hearing aid servicing.  2. An annual appointment was scheduled for 25     Janes Moss, CCC-A  N[place]# [id number]  Clinical Audiologist  [phone]

## 2024-09-01 DIAGNOSIS — I10 ESSENTIAL HYPERTENSION: ICD-10-CM

## 2024-09-03 RX ORDER — LOSARTAN POTASSIUM 50 MG/1
50 TABLET ORAL DAILY
Qty: 90 TABLET | Refills: 1 | Status: SHIPPED | OUTPATIENT
Start: 2024-09-03

## 2024-09-20 ENCOUNTER — NURSE TRIAGE (OUTPATIENT)
Age: 84
End: 2024-09-20

## 2024-09-20 ENCOUNTER — OFFICE VISIT (OUTPATIENT)
Dept: FAMILY MEDICINE CLINIC | Facility: CLINIC | Age: 84
End: 2024-09-20
Payer: COMMERCIAL

## 2024-09-20 VITALS
SYSTOLIC BLOOD PRESSURE: 130 MMHG | WEIGHT: 153 LBS | HEART RATE: 81 BPM | BODY MASS INDEX: 24.59 KG/M2 | TEMPERATURE: 97.8 F | OXYGEN SATURATION: 98 % | RESPIRATION RATE: 12 BRPM | HEIGHT: 66 IN | DIASTOLIC BLOOD PRESSURE: 62 MMHG

## 2024-09-20 DIAGNOSIS — R35.0 URINE FREQUENCY: ICD-10-CM

## 2024-09-20 DIAGNOSIS — I10 ESSENTIAL HYPERTENSION: Primary | ICD-10-CM

## 2024-09-20 DIAGNOSIS — I25.10 CAD, MULTIPLE VESSEL: ICD-10-CM

## 2024-09-20 PROCEDURE — G2211 COMPLEX E/M VISIT ADD ON: HCPCS | Performed by: FAMILY MEDICINE

## 2024-09-20 PROCEDURE — 99214 OFFICE O/P EST MOD 30 MIN: CPT | Performed by: FAMILY MEDICINE

## 2024-09-20 RX ORDER — HYDROCHLOROTHIAZIDE 12.5 MG/1
12.5 TABLET ORAL DAILY
Qty: 30 TABLET | Refills: 0 | Status: SHIPPED | OUTPATIENT
Start: 2024-09-20

## 2024-09-20 NOTE — PROGRESS NOTES
Kristopher Nunez 1940 male MRN: [id number]    [facility] OFFICE VISIT  [facility]      ASSESSMENT/PLAN  Kristopher Nunez is a 83 y.o. male presents to the office for    Diagnoses and all orders for this visit:    Essential hypertension  -     hydroCHLOROthiazide 12.5 mg tablet; Take 1 tablet (12.5 mg total) by mouth daily    CAD, multiple vessel       Hydrochlorothiazide 12.5 mg is only to be used if patient's blood pressures are greater than 160/90  Did advise the patient that I would send my note to his cardiologist  Continue on losartan 50 mg daily  Continue being monitored by the cardiologist regularly.  I do not believe that the patient needs to monitor his blood pressures daily           Future Appointments   Date Time Provider Department Center   11/8/2024 10:00 AM DO ROSITA [facility]   12/6/2024 11:00 AM Tita Rashid MD [facility]   12/11/2024 10:20 AM Abdulaziz Hernandez MD HEM ONC [facility]   12/16/2024 11:00 AM Jarrett Humphries PA-C URO [facility]   8/7/2025 10:00 AM Mirlande MARTINEZ OP [facility] [address]   8/7/2025 10:45 AM Mirlande MARTINEZ OP [facility] [address]          SUBJECTIVE  CC: Hypertension (Check up)      HPI:  Kristopher Nunez is a 83 y.o. male who presents for an acute appointment. Normally takes his Losartan at bedtime given that he was dizzy at the Gym  Tuesday went to the Dentist and it was 188/94. Second time went down a little.  Went home at 1:10 188/94 felt off took  a extra losartan 2 hours later came down without an issue.  18th 118/84  Floaty, light headed  182/93    Review of Systems   Constitutional:  Negative for activity change, appetite change, chills, fatigue and fever.   HENT:  Negative for congestion.    Respiratory:  Negative for cough, chest tightness and shortness of breath.    Cardiovascular:  Negative for chest pain and leg swelling.   Gastrointestinal:  Negative  "for abdominal distention, abdominal pain, constipation, diarrhea, nausea and vomiting.   Genitourinary:  Positive for frequency.   Neurological:  Positive for dizziness and light-headedness.   All other systems reviewed and are negative.      Historical Information   The patient history was reviewed as follows:  Past Medical History:   Diagnosis Date    Allergic     Arthritis     Benign essential hypertension     Coronary artery disease     Hearing problem     HL (hearing loss)     Hx of CABG     Hypercholesteremia     Hypertension     Wears glasses     as needed    Wears hearing aid     bilateral aids         Medications:     Current Outpatient Medications:     aspirin 81 MG tablet, Take 1 tablet by mouth daily at bedtime , Disp: , Rfl:     atorvastatin (LIPITOR) 10 mg tablet, TAKE 1 TABLET BY MOUTH DAILY, Disp: 100 tablet, Rfl: 1    Coenzyme Q10 (CoQ10) 100 MG CAPS, Take by mouth, Disp: , Rfl:     finasteride (PROSCAR) 5 mg tablet, Take 1 tablet (5 mg total) by mouth daily, Disp: 90 tablet, Rfl: 3    ibuprofen (MOTRIN) 200 mg tablet, Take 1 tablet by mouth 3 (three) times a day as needed, Disp: , Rfl:     losartan (COZAAR) 50 mg tablet, TAKE 1 TABLET BY MOUTH DAILY, Disp: 90 tablet, Rfl: 1    tamsulosin (FLOMAX) 0.4 mg, Take 1 capsule (0.4 mg total) by mouth daily with dinner, Disp: 90 capsule, Rfl: 1    VITAMIN D PO, Take 2,000 Units by mouth every morning , Disp: , Rfl:     ciclopirox (LOPROX) 0.77 % SUSP, Apply 1 Application topically 2 (two) times a day (Patient not taking: Reported on 6/6/2024), Disp: 30 mL, Rfl: 2    Allergies   Allergen Reactions    Acetaminophen Throat Swelling    Amoxicillin Rash    Lisinopril Cough       OBJECTIVE  Vitals:   Vitals:    09/20/24 1317   BP: 130/62   BP Location: Left arm   Patient Position: Sitting   Cuff Size: Standard   Pulse: 81   Resp: 12   Temp: 97.8 °F (36.6 °C)   TempSrc: Temporal   SpO2: 98%   Weight: 69.4 kg (153 lb)   Height: 5' 6\" (1.676 m)         Physical " Exam  Vitals reviewed.   Constitutional:       Appearance: He is well-developed.   HENT:      Head: Normocephalic and atraumatic.   Eyes:      Extraocular Movements: EOM normal.      Conjunctiva/sclera: Conjunctivae normal.      Pupils: Pupils are equal, round, and reactive to light.   Cardiovascular:      Rate and Rhythm: Normal rate and regular rhythm.      Heart sounds: Normal heart sounds, S1 normal and S2 normal. No murmur heard.  Pulmonary:      Effort: Pulmonary effort is normal. No respiratory distress.      Breath sounds: Normal breath sounds. No wheezing.   Musculoskeletal:         General: No edema. Normal range of motion.      Cervical back: Normal range of motion and neck supple.   Skin:     General: Skin is warm.   Neurological:      Mental Status: He is alert and oriented to person, place, and time.   Psychiatric:         Mood and Affect: Mood and affect normal.         Speech: Speech normal.         Behavior: Behavior normal.         Thought Content: Thought content normal.         Judgment: Judgment normal.                    Tita Escalante MD,   [facility]  9/20/2024

## 2024-09-20 NOTE — TELEPHONE ENCOUNTER
Regarding: Elevated Blood Pressure  ----- Message from Joycelyn GOMEZ sent at 9/20/2024  7:51 AM EDT -----  Patient called and stated his blood pressure has spiked again.  He took his BP this morning at 6:30 am and it was 182/93.  He took his losartan 50 mg tablet last night around 10 pm.  He feels fine other than his balance is not very good.  On Tuesday at the dentist his systolic BP was 165.  When he got home he took another losartan and it dropped into the 120's but eventually creeped up again.  Please call patient to triage at [phone].  Thank you!

## 2024-09-20 NOTE — TELEPHONE ENCOUNTER
"Reason for Disposition  • Patient wants to be seen    Answer Assessment - Initial Assessment Questions  1. BLOOD PRESSURE: \"What is the blood pressure?\" \"Did you take at least two measurements 5 minutes apart?\"          Started spike on Tuesday     182/90    Takes BP pill at night time only     153 78     Lightheaded and balance is off    2. ONSET: \"When did you take your blood pressure?\"        now    3. HOW: \"How did you obtain the blood pressure?\" (e.g., visiting nurse, automatic home BP monitor)        Automatic     4. HISTORY: \"Do you have a history of high blood pressure?\"        Yes    5. MEDICATIONS: \"Are you taking any medications for blood pressure?\" \"Have you missed any doses recently?\"          Losartan 50 mg at night    6. OTHER SYMPTOMS: \"Do you have any symptoms?\" (e.g., headache, chest pain, blurred vision, difficulty breathing, weakness)    denies    Protocols used: High Blood Pressure-ADULT-[place]    "

## 2024-09-20 NOTE — TELEPHONE ENCOUNTER
Patient called stating his BP started creeping up.  While on the phone it is 157/70.  Earlier this morning the BP was in the 180's per patient.  Denies chest pain, SOB, visual problems, headaches, weakness at this time.  He does complain of intermittent lightheadedness.       Appointment scheduled for 130 pm today.

## 2024-10-01 ENCOUNTER — TELEPHONE (OUTPATIENT)
Age: 84
End: 2024-10-01

## 2024-10-01 DIAGNOSIS — I10 ESSENTIAL HYPERTENSION: Primary | ICD-10-CM

## 2024-10-01 DIAGNOSIS — Z13.220 SCREENING CHOLESTEROL LEVEL: ICD-10-CM

## 2024-10-01 DIAGNOSIS — R73.09 ABNORMAL GLUCOSE: ICD-10-CM

## 2024-10-01 RX ORDER — LOSARTAN POTASSIUM 25 MG/1
TABLET ORAL
Qty: 30 TABLET | Refills: 0 | Status: SHIPPED | OUTPATIENT
Start: 2024-10-01

## 2024-10-01 NOTE — TELEPHONE ENCOUNTER
Patient called and stated he was ordered Hydrochlorothiazide 12.5 mg.on 9/20 Patient stated he stopped taking the medication on 9/27 due to the medication causing a gout flare up. Patients blood pressure this morning was 173/83 after the gym today was 120/62.

## 2024-10-01 NOTE — TELEPHONE ENCOUNTER
Please advise the patient that I will make note of it.  I knew there was a reason why we stopped it it just was not documented.  And now it will be.  I am happy his blood pressure was normal.  If he continues to have elevated blood pressure even in the setting of going to the gym please have him notify me.    If he would like I can call in another medication for rescue just in case   Dark Brown

## 2024-10-01 NOTE — TELEPHONE ENCOUNTER
1.  Please advise the patient that I ran a report and it seems he had a couple reactions in the past medications.  So at this time what I decided is I sent in the medication he has losartan 25 mg which is a smaller dose than the 50.  And I wrote rescue pill on there in case his blood pressure is ever above 160/90 to take this pill.  2.  Also fasting blood work placed

## 2024-10-01 NOTE — TELEPHONE ENCOUNTER
Patient advised. Patient would like a medication called in just in case. Please send to [facility]. Patient would like any blood work orders he needs prior to his next appointment to be ordered.

## 2024-10-02 NOTE — TELEPHONE ENCOUNTER
No need to call and confirm with the patient here is already aware     9:15am Friday please add him for BP check. Advised him if its super high just to walk in at 7:30am.    Patient already took a losartan 50 mg in the a.m.    At this time I advised him that I want him to take the losartan 50 mg as normal at bedtime.  If his blood pressure is elevated 160/90 I want him to take the extra losartan 20 5 in the AM.  On Friday morning I do not want him taking a rescue medication in order to verify that he truly needs it and is not a blood pressure cuff error

## 2024-10-02 NOTE — TELEPHONE ENCOUNTER
Patient called in to report BP at 4:10 PM is 192/92; HR=?. Reports feeling out of sorts; lightheaded. Patient did  order for losartan 25 mg (rescue pill). Patient will take losaran 50 mg at HS; check BP in AM id elevated with parameters>160/90, patient will take losartan 25 mg.  RN did advise patient if he feels worse this evening, lightheaded, dizziness, faint, sob, weakness to go to ER and follow up with office Thurs 10/3 AM.

## 2024-10-02 NOTE — TELEPHONE ENCOUNTER
Patient called, he is very confused by the messages in chart.  He stated his bp last night at 930 pm was 186/85 he took losartan 50 mg.  This morning at 630 am bp was still elevated at 184/85 he took another losartan 50 mg.

## 2024-10-03 ENCOUNTER — RA CDI HCC (OUTPATIENT)
Dept: OTHER | Facility: HOSPITAL | Age: 84
End: 2024-10-03

## 2024-10-03 NOTE — TELEPHONE ENCOUNTER
Patient stated that he feels much better since last night. Denies SOB, weakness, or lightheadedness. Patient stated that his BP was 170/73 this morning, he took a 25 mg dose of losartan. Patient will retake his BP at 9 am. I advised Patient to call the office when he takes his BP to update us. Patient agreeable. Patient advised to go to ER is he begins feeling symptomatic (SOB, lightheaded and/or weak). Patient agreeable to plan.    Ryleigh Nemec, MA

## 2024-10-03 NOTE — TELEPHONE ENCOUNTER
Pt called back with his most recent readings, /69 HR 83. Pt states he is not feeling 100% but he is feeling much better than before.

## 2024-10-03 NOTE — TELEPHONE ENCOUNTER
"Please call the patient first thing in the AM and see how he is doing? I didn't get this till after hours and want to be sure he is still not \" out of sorts\" if he is please go right to the ED. "

## 2024-10-04 ENCOUNTER — OFFICE VISIT (OUTPATIENT)
Dept: FAMILY MEDICINE CLINIC | Facility: CLINIC | Age: 84
End: 2024-10-04
Payer: COMMERCIAL

## 2024-10-04 VITALS
SYSTOLIC BLOOD PRESSURE: 139 MMHG | OXYGEN SATURATION: 98 % | TEMPERATURE: 98 F | HEART RATE: 80 BPM | RESPIRATION RATE: 16 BRPM | DIASTOLIC BLOOD PRESSURE: 69 MMHG

## 2024-10-04 DIAGNOSIS — I25.10 CAD, MULTIPLE VESSEL: ICD-10-CM

## 2024-10-04 DIAGNOSIS — I10 ESSENTIAL HYPERTENSION: ICD-10-CM

## 2024-10-04 DIAGNOSIS — F33.9 DEPRESSION, RECURRENT (HCC): Primary | ICD-10-CM

## 2024-10-04 PROCEDURE — 99214 OFFICE O/P EST MOD 30 MIN: CPT | Performed by: FAMILY MEDICINE

## 2024-10-04 PROCEDURE — G2211 COMPLEX E/M VISIT ADD ON: HCPCS | Performed by: FAMILY MEDICINE

## 2024-10-04 RX ORDER — ESCITALOPRAM OXALATE 5 MG/1
5 TABLET ORAL DAILY
Qty: 30 TABLET | Refills: 0 | Status: SHIPPED | OUTPATIENT
Start: 2024-10-04

## 2024-10-04 NOTE — PROGRESS NOTES
Kristopher Nunez 1940 male MRN: [id number]    [facility] OFFICE VISIT  [facility]      ASSESSMENT/PLAN  Kristopher Nunez is a 83 y.o. male presents to the office for    Diagnoses and all orders for this visit:    Depression, recurrent (HCC)  -     escitalopram (LEXAPRO) 5 mg tablet; Take 1 tablet (5 mg total) by mouth daily    CAD, multiple vessel    Essential hypertension       1.  Hypertension I do believe that this is likely stress-induced giving him labile blood pressure readings.  At this time we will have the patient take losartan 50 mg at bedtime.  And a rescue losartan 25 mg in the morning if his blood pressure is greater than 160/90.  If he does have to take a rescue recommend contacting our office    2.  BP seems to be very well-controlled either when he is watching the [employer] game or at the gym    3.  Recommend starting on Lexapro 5 mg at bedtime    Recommend close follow-up with this patient         Future Appointments   Date Time Provider Department Center   10/18/2024  8:45 AM Tita Rashid MD [facility]   11/8/2024 10:00 AM DO ROSITA Boykin [place] [facility]   12/6/2024 11:00 AM Tita Rashid MD [facility]   12/11/2024 10:20 AM Abdulaziz Hernandez MD H[facility] ON[facility]   12/16/2024 11:00 AM Jarrett Humphries PA-C [facility]   8/7/2025 10:00 AM Mirlande MARTINEZ OP [facility] [address]   8/7/2025 10:45 AM Mirlande MARTINEZ OP [facility] [address]          SUBJECTIVE  CC: Hypertension      HPI:  Kristopher Nunez is a 83 y.o. male who presents for an acute appointment.  Patient's blood pressure has been up and down.  As high as readings of systolic 180s.  Patient has been feeling out of sorts with these elevated blood pressures.  Daughter is present in our exam today states that he has been under a lot of stress.  Always worrying as well.  Patient does admit that he has been worrying about his wife  and his children on a daily basis.  He is agreeable to the depression/anxiety medication that we have discussed in the past.  Patient states his blood pressure is usually very well after the gym or watching baseball games.  Review of Systems   Constitutional:  Negative for activity change, appetite change, chills, fatigue and fever.   HENT:  Negative for congestion.    Respiratory:  Negative for cough, chest tightness and shortness of breath.    Cardiovascular:  Negative for chest pain and leg swelling.   Gastrointestinal:  Negative for abdominal distention, abdominal pain, constipation, diarrhea, nausea and vomiting.   Neurological:  Positive for light-headedness.   All other systems reviewed and are negative.      Historical Information   The patient history was reviewed as follows:  Past Medical History:   Diagnosis Date    Allergic     Arthritis     Benign essential hypertension     Coronary artery disease     Hearing problem     HL (hearing loss)     Hx of CABG     Hypercholesteremia     Hypertension     Wears glasses     as needed    Wears hearing aid     bilateral aids         Medications:     Current Outpatient Medications:     escitalopram (LEXAPRO) 5 mg tablet, Take 1 tablet (5 mg total) by mouth daily, Disp: 30 tablet, Rfl: 0    aspirin 81 MG tablet, Take 1 tablet by mouth daily at bedtime , Disp: , Rfl:     atorvastatin (LIPITOR) 10 mg tablet, TAKE 1 TABLET BY MOUTH DAILY, Disp: 100 tablet, Rfl: 1    ciclopirox (LOPROX) 0.77 % SUSP, Apply 1 Application topically 2 (two) times a day (Patient not taking: Reported on 6/6/2024), Disp: 30 mL, Rfl: 2    Coenzyme Q10 (CoQ10) 100 MG CAPS, Take by mouth, Disp: , Rfl:     finasteride (PROSCAR) 5 mg tablet, Take 1 tablet (5 mg total) by mouth daily, Disp: 90 tablet, Rfl: 3    ibuprofen (MOTRIN) 200 mg tablet, Take 1 tablet by mouth 3 (three) times a day as needed, Disp: , Rfl:     losartan (COZAAR) 25 mg tablet, Rescue pill: Take 1 tablet if BP > 160/90 and contact  office, Disp: 30 tablet, Rfl: 0    losartan (COZAAR) 50 mg tablet, TAKE 1 TABLET BY MOUTH DAILY, Disp: 90 tablet, Rfl: 1    tamsulosin (FLOMAX) 0.4 mg, Take 1 capsule (0.4 mg total) by mouth daily with dinner, Disp: 90 capsule, Rfl: 1    VITAMIN D PO, Take 2,000 Units by mouth every morning , Disp: , Rfl:     Allergies   Allergen Reactions    Acetaminophen Throat Swelling    Amoxicillin Rash    Lisinopril Cough       OBJECTIVE  Vitals:   Vitals:    10/04/24 0746   BP: 139/69   Pulse: 80   Resp: 16   Temp: 98 °F (36.7 °C)   SpO2: 98%         Physical Exam  Constitutional:       Appearance: Normal appearance.   Pulmonary:      Effort: Pulmonary effort is normal.   Musculoskeletal:         General: Normal range of motion.   Neurological:      General: No focal deficit present.      Mental Status: He is alert and oriented to person, place, and time.   Psychiatric:         Mood and Affect: Mood normal.         Behavior: Behavior normal.         Thought Content: Thought content normal.         Judgment: Judgment normal.                    Tita Escalante MD,   [facility]  10/4/2024

## 2024-10-16 ENCOUNTER — TELEPHONE (OUTPATIENT)
Age: 84
End: 2024-10-16

## 2024-10-16 NOTE — TELEPHONE ENCOUNTER
Received call from pt asking if there was any blood work Dr. Penaloza would like completed prior to his office visit on 11/8/24.  He is already ordered for an A1C and Lipid panel by his PCP and will be getting them done sometime next week.

## 2024-10-18 ENCOUNTER — OFFICE VISIT (OUTPATIENT)
Dept: FAMILY MEDICINE CLINIC | Facility: CLINIC | Age: 84
End: 2024-10-18
Payer: COMMERCIAL

## 2024-10-18 VITALS
DIASTOLIC BLOOD PRESSURE: 70 MMHG | HEART RATE: 84 BPM | RESPIRATION RATE: 12 BRPM | OXYGEN SATURATION: 99 % | WEIGHT: 152 LBS | BODY MASS INDEX: 24.43 KG/M2 | HEIGHT: 66 IN | TEMPERATURE: 96.9 F | SYSTOLIC BLOOD PRESSURE: 112 MMHG

## 2024-10-18 DIAGNOSIS — F33.9 DEPRESSION, RECURRENT (HCC): Primary | ICD-10-CM

## 2024-10-18 DIAGNOSIS — N40.1 BENIGN PROSTATIC HYPERPLASIA WITH NOCTURIA: ICD-10-CM

## 2024-10-18 DIAGNOSIS — Z13.220 SCREENING CHOLESTEROL LEVEL: ICD-10-CM

## 2024-10-18 DIAGNOSIS — Z23 ENCOUNTER FOR IMMUNIZATION: ICD-10-CM

## 2024-10-18 DIAGNOSIS — I10 ESSENTIAL HYPERTENSION: ICD-10-CM

## 2024-10-18 DIAGNOSIS — R35.1 BENIGN PROSTATIC HYPERPLASIA WITH NOCTURIA: ICD-10-CM

## 2024-10-18 DIAGNOSIS — Z13.29 SCREENING FOR THYROID DISORDER: ICD-10-CM

## 2024-10-18 PROCEDURE — 90662 IIV NO PRSV INCREASED AG IM: CPT | Performed by: FAMILY MEDICINE

## 2024-10-18 PROCEDURE — G0008 ADMIN INFLUENZA VIRUS VAC: HCPCS | Performed by: FAMILY MEDICINE

## 2024-10-18 PROCEDURE — 99214 OFFICE O/P EST MOD 30 MIN: CPT | Performed by: FAMILY MEDICINE

## 2024-10-18 RX ORDER — TROSPIUM CHLORIDE 20 MG/1
20 TABLET, FILM COATED ORAL
Qty: 30 TABLET | Refills: 2 | Status: SHIPPED | OUTPATIENT
Start: 2024-10-18

## 2024-10-18 RX ORDER — ESCITALOPRAM OXALATE 10 MG/1
10 TABLET ORAL DAILY
Qty: 90 TABLET | Refills: 0 | Status: SHIPPED | OUTPATIENT
Start: 2024-10-18 | End: 2024-10-22 | Stop reason: SDUPTHER

## 2024-10-18 NOTE — PROGRESS NOTES
Kristopher Nunez 1940 male MRN: [id number]    [facility] PRACTICE OFFICE VISIT  [facility]      ASSESSMENT/PLAN  Kristopher Nunez is a 83 y.o. male presents to the office for    Diagnoses and all orders for this visit:    Depression, recurrent (HCC)  -     escitalopram (LEXAPRO) 10 mg tablet; Take 1 tablet (10 mg total) by mouth daily    Essential hypertension  -     CBC and differential; Future  -     Comprehensive metabolic panel; Future    Benign prostatic hyperplasia with nocturia  -     trospium chloride (SANCTURA) 20 mg tablet; Take 1 tablet (20 mg total) by mouth daily after dinner    Screening cholesterol level  -     Lipid panel; Future    Screening for thyroid disorder  -     TSH, 3rd generation with Free T4 reflex; Future    Encounter for immunization  -     influenza vaccine, high-dose, PF 0.5 mL (Fluzone High Dose)       Recommend increasing Lexapro to 10 mg  Hypertension currently stable.  CBC/CMP  BPH discussed with patient's urologist and started on treatment above and will reassess with him in 2 months.  Can make his BP lower , if he gets dizzy or lightheaded to please let us know  Flu vaccine given today  All other screening labs ordered           Future Appointments   Date Time Provider Department Center   11/8/2024 10:00 AM DO ROSITA Boykin [place] [facility]   12/6/2024 11:00 AM Tita Rashid MD [facility]   12/11/2024 10:20 AM Abdulaziz Hernandez MD HEM ONC [facility]   12/16/2024 11:00 AM Jarrett Humphries PA-C [facility]   8/7/2025 10:00 AM Mirlande MARTINEZ OP [facility] [address]   8/7/2025 10:45 AM Mirlande MARTINEZ OP [facility] [address]          SUBJECTIVE  CC: Follow-up (2 week bp check)      HPI:  Kristopher Nunez is a 83 y.o. male who presents for an blood pressure appointment.  Patient states he has been monitoring his BP at home.  Slightly has been elevated.  States that it is always elevated when he  does it secondary to possibly anxiety.  Has not noticed a difference with the Lexapro 5 mg.  Patient states that his urinary frequency has been the only thing that really makes him wake up in the middle the night and it is hard for him to go back to bed denies any chest pain or shortness of breath.  Following.  Urology in 2 months          Review of Systems   Constitutional:  Negative for activity change, appetite change, chills, fatigue and fever.   HENT:  Negative for congestion.    Respiratory:  Negative for cough, chest tightness and shortness of breath.    Cardiovascular:  Negative for chest pain and leg swelling.   Gastrointestinal:  Negative for abdominal distention, abdominal pain, constipation, diarrhea, nausea and vomiting.   Genitourinary:  Positive for frequency.   All other systems reviewed and are negative.      Historical Information   The patient history was reviewed as follows:  Past Medical History:   Diagnosis Date    Allergic     Arthritis     Benign essential hypertension     Coronary artery disease     Hearing problem     HL (hearing loss)     Hx of CABG     Hypercholesteremia     Hypertension     Wears glasses     as needed    Wears hearing aid     bilateral aids         Medications:     Current Outpatient Medications:     aspirin 81 MG tablet, Take 1 tablet by mouth daily at bedtime , Disp: , Rfl:     atorvastatin (LIPITOR) 10 mg tablet, TAKE 1 TABLET BY MOUTH DAILY, Disp: 100 tablet, Rfl: 1    Coenzyme Q10 (CoQ10) 100 MG CAPS, Take by mouth, Disp: , Rfl:     escitalopram (LEXAPRO) 10 mg tablet, Take 1 tablet (10 mg total) by mouth daily, Disp: 90 tablet, Rfl: 0    finasteride (PROSCAR) 5 mg tablet, Take 1 tablet (5 mg total) by mouth daily, Disp: 90 tablet, Rfl: 3    losartan (COZAAR) 50 mg tablet, TAKE 1 TABLET BY MOUTH DAILY, Disp: 90 tablet, Rfl: 1    tamsulosin (FLOMAX) 0.4 mg, Take 1 capsule (0.4 mg total) by mouth daily with dinner, Disp: 90 capsule, Rfl: 1    trospium chloride  "(SANCTURA) 20 mg tablet, Take 1 tablet (20 mg total) by mouth daily after dinner, Disp: 30 tablet, Rfl: 2    VITAMIN D PO, Take 2,000 Units by mouth every morning , Disp: , Rfl:     ciclopirox (LOPROX) 0.77 % SUSP, Apply 1 Application topically 2 (two) times a day (Patient not taking: Reported on 6/6/2024), Disp: 30 mL, Rfl: 2    losartan (COZAAR) 25 mg tablet, Rescue pill: Take 1 tablet if BP > 160/90 and contact office, Disp: 90 tablet, Rfl: 2    Allergies   Allergen Reactions    Acetaminophen Throat Swelling    Amoxicillin Rash    Lisinopril Cough       OBJECTIVE  Vitals:   Vitals:    10/18/24 0821   BP: 112/70   BP Location: Left arm   Patient Position: Sitting   Cuff Size: Standard   Pulse: 84   Resp: 12   Temp: (!) 96.9 °F (36.1 °C)   TempSrc: Temporal   SpO2: 99%   Weight: 68.9 kg (152 lb)   Height: 5' 6\" (1.676 m)         Physical Exam  Vitals reviewed.   Constitutional:       Appearance: He is well-developed.   HENT:      Head: Normocephalic and atraumatic.   Eyes:      Conjunctiva/sclera: Conjunctivae normal.      Pupils: Pupils are equal, round, and reactive to light.   Cardiovascular:      Rate and Rhythm: Normal rate and regular rhythm.      Heart sounds: Normal heart sounds, S1 normal and S2 normal. No murmur heard.  Pulmonary:      Effort: Pulmonary effort is normal. No respiratory distress.      Breath sounds: Normal breath sounds. No wheezing.   Musculoskeletal:         General: Normal range of motion.      Cervical back: Normal range of motion and neck supple.   Skin:     General: Skin is warm.   Neurological:      Mental Status: He is alert and oriented to person, place, and time.   Psychiatric:         Speech: Speech normal.         Behavior: Behavior normal.         Thought Content: Thought content normal.         Judgment: Judgment normal.                    Tita Escalante MD,   [facility]  10/21/2024      "

## 2024-10-21 DIAGNOSIS — I10 ESSENTIAL HYPERTENSION: ICD-10-CM

## 2024-10-21 RX ORDER — LOSARTAN POTASSIUM 25 MG/1
TABLET ORAL
Qty: 90 TABLET | Refills: 2 | Status: SHIPPED | OUTPATIENT
Start: 2024-10-21

## 2024-10-22 ENCOUNTER — TELEPHONE (OUTPATIENT)
Age: 84
End: 2024-10-22

## 2024-10-22 DIAGNOSIS — F33.9 DEPRESSION, RECURRENT (HCC): ICD-10-CM

## 2024-10-22 RX ORDER — ESCITALOPRAM OXALATE 10 MG/1
10 TABLET ORAL DAILY
Qty: 90 TABLET | Refills: 0 | Status: SHIPPED | OUTPATIENT
Start: 2024-10-22

## 2024-10-22 NOTE — TELEPHONE ENCOUNTER
Patient calls in today, because he was told by his insurance that they denied the escitalopram.  They state that the generic was not ordered.      Please advise.

## 2024-10-22 NOTE — TELEPHONE ENCOUNTER
FYI:      Generic was sent to pharmacy.  Spoke w/[facility] confirmed Rx was shipped yesterday to patient.  Patient advised.

## 2024-10-28 ENCOUNTER — TELEPHONE (OUTPATIENT)
Age: 84
End: 2024-10-28

## 2024-10-28 DIAGNOSIS — N40.1 BENIGN PROSTATIC HYPERPLASIA WITH NOCTURIA: ICD-10-CM

## 2024-10-28 DIAGNOSIS — R35.1 BENIGN PROSTATIC HYPERPLASIA WITH NOCTURIA: ICD-10-CM

## 2024-10-28 RX ORDER — TROSPIUM CHLORIDE 20 MG/1
20 TABLET, FILM COATED ORAL
Qty: 90 TABLET | Refills: 1 | Status: SHIPPED | OUTPATIENT
Start: 2024-10-28

## 2024-10-28 NOTE — TELEPHONE ENCOUNTER
Patient called in to report 2 problems with medications:   BP med losartan 25 mg if SBP>160 in AM  Patient reports his BP is ususally >160 and he takes it in the AM (160-2000's).and when he returns home from the gym, SBP is in the 120's. Takes losartan 50 mg at bedtime. Patient reports problem with pharmacy for the 2 orders of losartan and getting a 90-day supply of the medication. RN did reach out to [facility] to get the clarification; hold on order due to frequency of dosing for losartan 25 mg. Is it daily?  Please clarify order for it to be released as 90-say supply.   Order for ttrospium chloride (SANCTURA) 20 mg tablet  for prostate was initially for 30-day supply. Patient reports no side effects, feels medication is effective for hs prostate and requesting 90-day order be sent to [facility] mail service.

## 2024-10-29 DIAGNOSIS — I10 ESSENTIAL HYPERTENSION: ICD-10-CM

## 2024-10-29 RX ORDER — LOSARTAN POTASSIUM 25 MG/1
TABLET ORAL
Qty: 90 TABLET | Refills: 2 | Status: SHIPPED | OUTPATIENT
Start: 2024-10-29

## 2024-10-29 RX ORDER — LOSARTAN POTASSIUM 50 MG/1
50 TABLET ORAL
Qty: 90 TABLET | Refills: 1 | Status: SHIPPED | OUTPATIENT
Start: 2024-10-29

## 2024-11-08 ENCOUNTER — OFFICE VISIT (OUTPATIENT)
Dept: CARDIOLOGY CLINIC | Facility: CLINIC | Age: 84
End: 2024-11-08
Payer: COMMERCIAL

## 2024-11-08 VITALS
SYSTOLIC BLOOD PRESSURE: 122 MMHG | HEIGHT: 66 IN | OXYGEN SATURATION: 98 % | HEART RATE: 69 BPM | BODY MASS INDEX: 24.91 KG/M2 | WEIGHT: 155 LBS | DIASTOLIC BLOOD PRESSURE: 68 MMHG

## 2024-11-08 DIAGNOSIS — I25.10 CORONARY ARTERY DISEASE INVOLVING NATIVE CORONARY ARTERY OF NATIVE HEART WITHOUT ANGINA PECTORIS: Primary | ICD-10-CM

## 2024-11-08 DIAGNOSIS — E78.2 MIXED HYPERLIPIDEMIA: ICD-10-CM

## 2024-11-08 DIAGNOSIS — I10 ESSENTIAL HYPERTENSION: ICD-10-CM

## 2024-11-08 DIAGNOSIS — Z95.1 S/P CABG X 4: ICD-10-CM

## 2024-11-08 DIAGNOSIS — Z15.89 MTHFR MUTATION: ICD-10-CM

## 2024-11-08 PROCEDURE — 99214 OFFICE O/P EST MOD 30 MIN: CPT | Performed by: INTERNAL MEDICINE

## 2024-11-08 PROCEDURE — 93000 ELECTROCARDIOGRAM COMPLETE: CPT | Performed by: INTERNAL MEDICINE

## 2024-11-08 NOTE — PROGRESS NOTES
Subjective:     Kristopher Nunez is a 84 y.o. male  who presents to the office today for follow up of CAD.   Since his last visit, he has been feeling well.  he denies any palpitations, chest pain, shortness of breath, LE edema, orthopnea or PND.   Diet is overall unchanged.  There has not been a significant change in weight.    He exercises with a treadmill 6 days a week/40 minutes a day.  Also strength trains regularly.     His last blood work was normal.  His last LDL cholesterol was 57 mg/dL.  HDL was low at 31 mg/dL     Previously, he underwent coronary artery bypass grafting on January 15, 2015. He had LIMA to LAD, SVG to PDA, SVG to OM. Catheterization was done prior to this for evaluation of chest pain. He had a 70% left main stenosis, 60% LAD stenosis, 80% circumflex disease and 90% PDA stenosis. Surgery had no complications.     The following portions of the patient's history were reviewed and updated as appropriate: allergies, current medications, past family history, past medical history, past social history, past surgical history, and problem list.    Review of Systems  Review of Systems   Respiratory:  Negative for shortness of breath.    Cardiovascular:  Negative for chest pain, palpitations and leg swelling.   All other systems reviewed and are negative.       Current Outpatient Medications on File Prior to Visit   Medication Sig Dispense Refill    aspirin 81 MG tablet Take 1 tablet by mouth daily at bedtime       atorvastatin (LIPITOR) 10 mg tablet TAKE 1 TABLET BY MOUTH DAILY 100 tablet 1    Coenzyme Q10 (CoQ10) 100 MG CAPS Take by mouth      escitalopram (LEXAPRO) 10 mg tablet Take 1 tablet (10 mg total) by mouth daily 90 tablet 0    finasteride (PROSCAR) 5 mg tablet Take 1 tablet (5 mg total) by mouth daily 90 tablet 3    losartan (COZAAR) 25 mg tablet Take 1 tablet daily in the AM 90 tablet 2    losartan (COZAAR) 50 mg tablet Take 1 tablet (50 mg total) by mouth daily at bedtime 90 tablet 1     "tamsulosin (FLOMAX) 0.4 mg Take 1 capsule (0.4 mg total) by mouth daily with dinner 90 capsule 1    trospium chloride (SANCTURA) 20 mg tablet Take 1 tablet (20 mg total) by mouth daily after dinner 90 tablet 1    VITAMIN D PO Take 2,000 Units by mouth every morning       ciclopirox (LOPROX) 0.77 % SUSP Apply 1 Application topically 2 (two) times a day (Patient not taking: Reported on 6/6/2024) 30 mL 2     No current facility-administered medications on file prior to visit.          Objective:      Physical Exam  /68 (BP Location: Left arm, Patient Position: Sitting, Cuff Size: Large)   Pulse 69   Ht 5' 6\" (1.676 m)   Wt 70.3 kg (155 lb)   SpO2 98%   BMI 25.02 kg/m²    Physical Exam   Constitutional: He appears healthy. No distress.   Eyes: Pupils are equal, round, and reactive to light. Conjunctivae are normal.   Neck: No JVD present.   Cardiovascular: Normal rate and regular rhythm. Exam reveals no gallop and no friction rub.   Murmur heard.  Pulmonary/Chest: Effort normal and breath sounds normal. He has no wheezes. He has no rales.   Musculoskeletal:         General: No tenderness, deformity or edema.      Cervical back: Normal range of motion and neck supple.   Neurological: He is alert and oriented to person, place, and time.   Skin: Skin is warm and dry.        Cardiographics    See above    ECG: NSR with rate 70    Lab Review   Lab Results   Component Value Date     10/23/2015    K 4.1 04/16/2024    K 4.3 06/01/2020     04/16/2024     06/01/2020    CO2 26 04/16/2024    CO2 24 06/01/2020    BUN 22 04/16/2024    BUN 20 06/01/2020    CREATININE 1.15 04/16/2024    CREATININE 1.2 10/23/2015    GLUCOSE 105 10/23/2015    CALCIUM 8.8 04/16/2024    CALCIUM 8.8 10/23/2015         Lab Results   Component Value Date    CHOL 160 01/14/2015    TRIG 89 04/16/2024    TRIG 100 06/01/2020    HDL 31 (L) 04/16/2024    HDL 31 (L) 06/01/2020    LDLCALC 57 04/16/2024    LDLCALC 69 06/01/2020      " Assessment/Plan:     Assessment & Plan  Coronary artery disease involving native coronary artery of native heart without angina pectoris  - Free of angina.    - Continue with regular exercise.    Essential hypertension  - Continue losartan 50 mg daily  Mixed hyperlipidemia  - Continue atorvastatin - last LDL was at goal.    - Lipid panel.  S/P CABG x 4  - Risk factor reduction reviewed  - Last stress test was normal.    MTHFR mutation  - Continue losartan

## 2024-11-14 ENCOUNTER — TELEPHONE (OUTPATIENT)
Age: 84
End: 2024-11-14

## 2024-11-14 NOTE — TELEPHONE ENCOUNTER
Patient called, stated that he received a message from [facility] stating that there is not enough information on Trospium Chloride 20 mg for the prescription to be mailed to the pation Patient is almost out of the medication. Please follow up.

## 2024-11-15 NOTE — TELEPHONE ENCOUNTER
I called [facility] and they just wanted to confirm the dose idiopathic thrombocytopenic purpura will be shipped the patient advised

## 2024-11-27 ENCOUNTER — RA CDI HCC (OUTPATIENT)
Dept: OTHER | Facility: HOSPITAL | Age: 84
End: 2024-11-27

## 2024-12-02 ENCOUNTER — APPOINTMENT (OUTPATIENT)
Dept: LAB | Facility: CLINIC | Age: 84
End: 2024-12-02
Payer: COMMERCIAL

## 2024-12-02 DIAGNOSIS — Z13.220 SCREENING CHOLESTEROL LEVEL: ICD-10-CM

## 2024-12-02 DIAGNOSIS — R73.09 ABNORMAL GLUCOSE: ICD-10-CM

## 2024-12-02 DIAGNOSIS — E83.118 OTHER HEMOCHROMATOSIS: ICD-10-CM

## 2024-12-02 DIAGNOSIS — Z13.29 SCREENING FOR THYROID DISORDER: ICD-10-CM

## 2024-12-02 DIAGNOSIS — I10 ESSENTIAL HYPERTENSION: ICD-10-CM

## 2024-12-02 LAB
ALBUMIN SERPL BCG-MCNC: 4.1 G/DL (ref 3.5–5)
ALP SERPL-CCNC: 47 U/L (ref 34–104)
ALT SERPL W P-5'-P-CCNC: 14 U/L (ref 7–52)
ANION GAP SERPL CALCULATED.3IONS-SCNC: 8 MMOL/L (ref 4–13)
AST SERPL W P-5'-P-CCNC: 14 U/L (ref 13–39)
BASOPHILS # BLD AUTO: 0.07 THOUSANDS/ΜL (ref 0–0.1)
BASOPHILS NFR BLD AUTO: 1 % (ref 0–1)
BILIRUB SERPL-MCNC: 0.62 MG/DL (ref 0.2–1)
BUN SERPL-MCNC: 25 MG/DL (ref 5–25)
CALCIUM SERPL-MCNC: 8.5 MG/DL (ref 8.4–10.2)
CHLORIDE SERPL-SCNC: 103 MMOL/L (ref 96–108)
CHOLEST SERPL-MCNC: 105 MG/DL (ref ?–200)
CO2 SERPL-SCNC: 26 MMOL/L (ref 21–32)
CREAT SERPL-MCNC: 1.21 MG/DL (ref 0.6–1.3)
EOSINOPHIL # BLD AUTO: 0.18 THOUSAND/ΜL (ref 0–0.61)
EOSINOPHIL NFR BLD AUTO: 3 % (ref 0–6)
ERYTHROCYTE [DISTWIDTH] IN BLOOD BY AUTOMATED COUNT: 12.7 % (ref 11.6–15.1)
EST. AVERAGE GLUCOSE BLD GHB EST-MCNC: 123 MG/DL
FERRITIN SERPL-MCNC: 61 NG/ML (ref 24–336)
GFR SERPL CREATININE-BSD FRML MDRD: 55 ML/MIN/1.73SQ M
GLUCOSE P FAST SERPL-MCNC: 112 MG/DL (ref 65–99)
HBA1C MFR BLD: 5.9 %
HCT VFR BLD AUTO: 43.3 % (ref 36.5–49.3)
HDLC SERPL-MCNC: 34 MG/DL
HGB BLD-MCNC: 13.8 G/DL (ref 12–17)
IMM GRANULOCYTES # BLD AUTO: 0.02 THOUSAND/UL (ref 0–0.2)
IMM GRANULOCYTES NFR BLD AUTO: 0 % (ref 0–2)
IRON SATN MFR SERPL: 23 % (ref 15–50)
IRON SERPL-MCNC: 67 UG/DL (ref 50–212)
LDLC SERPL CALC-MCNC: 56 MG/DL (ref 0–100)
LYMPHOCYTES # BLD AUTO: 1.45 THOUSANDS/ΜL (ref 0.6–4.47)
LYMPHOCYTES NFR BLD AUTO: 21 % (ref 14–44)
MCH RBC QN AUTO: 28.6 PG (ref 26.8–34.3)
MCHC RBC AUTO-ENTMCNC: 31.9 G/DL (ref 31.4–37.4)
MCV RBC AUTO: 90 FL (ref 82–98)
MONOCYTES # BLD AUTO: 0.65 THOUSAND/ΜL (ref 0.17–1.22)
MONOCYTES NFR BLD AUTO: 9 % (ref 4–12)
NEUTROPHILS # BLD AUTO: 4.58 THOUSANDS/ΜL (ref 1.85–7.62)
NEUTS SEG NFR BLD AUTO: 66 % (ref 43–75)
NONHDLC SERPL-MCNC: 71 MG/DL
NRBC BLD AUTO-RTO: 0 /100 WBCS
PLATELET # BLD AUTO: 246 THOUSANDS/UL (ref 149–390)
PMV BLD AUTO: 11.1 FL (ref 8.9–12.7)
POTASSIUM SERPL-SCNC: 4 MMOL/L (ref 3.5–5.3)
PROT SERPL-MCNC: 6.8 G/DL (ref 6.4–8.4)
RBC # BLD AUTO: 4.83 MILLION/UL (ref 3.88–5.62)
SODIUM SERPL-SCNC: 137 MMOL/L (ref 135–147)
TIBC SERPL-MCNC: 290 UG/DL (ref 250–450)
TRIGL SERPL-MCNC: 77 MG/DL (ref ?–150)
TSH SERPL DL<=0.05 MIU/L-ACNC: 1.31 UIU/ML (ref 0.45–4.5)
UIBC SERPL-MCNC: 223 UG/DL (ref 155–355)
WBC # BLD AUTO: 6.95 THOUSAND/UL (ref 4.31–10.16)

## 2024-12-02 PROCEDURE — 82728 ASSAY OF FERRITIN: CPT

## 2024-12-02 PROCEDURE — 36415 COLL VENOUS BLD VENIPUNCTURE: CPT

## 2024-12-02 PROCEDURE — 83540 ASSAY OF IRON: CPT

## 2024-12-02 PROCEDURE — 80053 COMPREHEN METABOLIC PANEL: CPT

## 2024-12-02 PROCEDURE — 83550 IRON BINDING TEST: CPT

## 2024-12-02 PROCEDURE — 80061 LIPID PANEL: CPT

## 2024-12-02 PROCEDURE — 83036 HEMOGLOBIN GLYCOSYLATED A1C: CPT

## 2024-12-02 PROCEDURE — 85025 COMPLETE CBC W/AUTO DIFF WBC: CPT

## 2024-12-02 PROCEDURE — 84443 ASSAY THYROID STIM HORMONE: CPT

## 2024-12-04 ENCOUNTER — RESULTS FOLLOW-UP (OUTPATIENT)
Dept: CARDIOLOGY CLINIC | Facility: CLINIC | Age: 84
End: 2024-12-04

## 2024-12-04 NOTE — TELEPHONE ENCOUNTER
----- Message from Gregory Penaloza DO sent at 12/4/2024  1:28 PM EST -----  Can you please let the patient know blood work was normal

## 2024-12-06 ENCOUNTER — OFFICE VISIT (OUTPATIENT)
Dept: FAMILY MEDICINE CLINIC | Facility: CLINIC | Age: 84
End: 2024-12-06
Payer: COMMERCIAL

## 2024-12-06 VITALS
BODY MASS INDEX: 25.39 KG/M2 | HEIGHT: 66 IN | TEMPERATURE: 97.2 F | RESPIRATION RATE: 12 BRPM | DIASTOLIC BLOOD PRESSURE: 70 MMHG | HEART RATE: 65 BPM | SYSTOLIC BLOOD PRESSURE: 140 MMHG | OXYGEN SATURATION: 98 % | WEIGHT: 158 LBS

## 2024-12-06 DIAGNOSIS — R73.09 ABNORMAL GLUCOSE: ICD-10-CM

## 2024-12-06 DIAGNOSIS — Z00.00 MEDICARE ANNUAL WELLNESS VISIT, SUBSEQUENT: Primary | ICD-10-CM

## 2024-12-06 DIAGNOSIS — F33.9 DEPRESSION, RECURRENT (HCC): ICD-10-CM

## 2024-12-06 DIAGNOSIS — M79.10 MUSCLE PAIN: ICD-10-CM

## 2024-12-06 DIAGNOSIS — I10 ESSENTIAL HYPERTENSION: ICD-10-CM

## 2024-12-06 DIAGNOSIS — Z12.5 SCREENING PSA (PROSTATE SPECIFIC ANTIGEN): ICD-10-CM

## 2024-12-06 PROCEDURE — G0439 PPPS, SUBSEQ VISIT: HCPCS | Performed by: FAMILY MEDICINE

## 2024-12-06 NOTE — PROGRESS NOTES
Name: Kristopher Nunez      : 1940      MRN: [id number]  Encounter Provider: Tita Rashid MD  Encounter Date: 2024   Encounter department: [facility]    Assessment & Plan  Medicare annual wellness visit, subsequent         Muscle pain  Muscle pain over the left upper chest.  Reproducible.  Recommend IcyHot or Voltaren gel       Essential hypertension  Currently stable continue following cardiology  Orders:    CBC and differential; Future    Comprehensive metabolic panel; Future    Depression, recurrent (HCC)  Stable I do believe the Lexapro 10 mg has been helping the patient       Abnormal glucose  Monitor repeat levels  Orders:    Comprehensive metabolic panel; Future    Hemoglobin A1C; Future    Screening PSA (prostate specific antigen)    Orders:    PSA, Total Screen; Future       Preventive health issues were discussed with patient, and age appropriate screening tests were ordered as noted in patient's After Visit Summary. Personalized health advice and appropriate referrals for health education or preventive services given if needed, as noted in patient's After Visit Summary.    History of Present Illness     HPI   84-year-old male presenting to the office for an annual.  Patient states that his depression has been stable but he started caters to his wife unfortunately who is battling Parkinson's disease.  Patient is taking his blood pressure medication as shown on the list.  States that he is also been seeing cardiology  Patient Care Team:  Tita Rashid MD as PCP - General (Family Medicine)  MD Abdulaziz Mcgregor MD J Raymond Fitzpatrick, MD Navtej Brar, DO    Review of Systems   Constitutional:  Negative for activity change, appetite change, chills, fatigue and fever.   HENT:  Negative for congestion.    Respiratory:  Negative for cough, chest tightness and shortness of breath.    Cardiovascular:  Negative for chest pain and leg  swelling.   Gastrointestinal:  Negative for abdominal distention, abdominal pain, constipation, diarrhea, nausea and vomiting.   All other systems reviewed and are negative.    Medical History Reviewed by provider this encounter:  Tobacco  Allergies  Meds  Problems  Med Hx  Surg Hx  Fam Hx       Annual Wellness Visit Questionnaire   Kristopher is here for his Subsequent Wellness visit.     Health Risk Assessment:   Patient rates overall health as very good. Patient feels that their physical health rating is slightly better. Patient is very satisfied with their life. Eyesight was rated as same. Hearing was rated as same. Patient feels that their emotional and mental health rating is slightly better. Patients states they are never, rarely angry. Patient states they are sometimes unusually tired/fatigued. Pain experienced in the last 7 days has been none. Patient states that he has experienced no weight loss or gain in last 6 months.     Depression Screening:   PHQ-9 Score: 2      Fall Risk Screening:   In the past year, patient has experienced: history of falling in past year      Home Safety:  Patient does not have trouble with stairs inside or outside of their home. Patient has working smoke alarms and has working carbon monoxide detector. Home safety hazards include: none.     Nutrition:   Current diet is Regular.     Medications:   Patient is currently taking over-the-counter supplements. OTC medications include: see medication list. Patient is able to manage medications.     Activities of Daily Living (ADLs)/Instrumental Activities of Daily Living (IADLs):   Walk and transfer into and out of bed and chair?: Yes  Dress and groom yourself?: Yes    Bathe or shower yourself?: Yes    Feed yourself? Yes  Do your laundry/housekeeping?: Yes  Manage your money, pay your bills and track your expenses?: Yes  Make your own meals?: Yes    Do your own shopping?: Yes    Durable Medical Equipment Suppliers  none    Previous  Hospitalizations:   Any hospitalizations or ED visits within the last 12 months?: No      Advance Care Planning:   Living will: Yes    Durable POA for healthcare: Yes    Advanced directive: Yes      Cognitive Screening:   Provider or family/friend/caregiver concerned regarding cognition?: No    PREVENTIVE SCREENINGS      Cardiovascular Screening:    General: Screening Not Indicated and History Lipid Disorder      Diabetes Screening:     General: Screening Current      Colorectal Cancer Screening:     General: Screening Current      Prostate Cancer Screening:    General: Screening Not Indicated      Osteoporosis Screening:    General: Risks and Benefits Discussed      Abdominal Aortic Aneurysm (AAA) Screening:    Risk factors include: tobacco use        General: Risks and Benefits Discussed      Lung Cancer Screening:     General: Screening Not Indicated      Hepatitis C Screening:    General: Risks and Benefits Discussed    Screening, Brief Intervention, and Referral to Treatment (SBIRT)    Screening  Typical number of drinks in a day: 0  Typical number of drinks in a week: 0  Interpretation: Low risk drinking behavior.    AUDIT-C Screenin) How often did you have a drink containing alcohol in the past year? never  2) How many drinks did you have on a typical day when you were drinking in the past year? 0  3) How often did you have 6 or more drinks on one occasion in the past year? never    AUDIT-C Score: 0  Interpretation: Score 0-3 (male): Negative screen for alcohol misuse    Single Item Drug Screening:  How often have you used an illegal drug (including marijuana) or a prescription medication for non-medical reasons in the past year? never    Single Item Drug Screen Score: 0  Interpretation: Negative screen for possible drug use disorder    Social Drivers of Health     Financial Resource Strain: Low Risk  (2023)    Overall Financial Resource Strain (CARDIA)     Difficulty of Paying Living Expenses: Not  "very hard   Food Insecurity: No Food Insecurity (12/6/2024)    Hunger Vital Sign     Worried About Running Out of Food in the Last Year: Never true     Ran Out of Food in the Last Year: Never true   Transportation Needs: No Transportation Needs (12/6/2024)    PRAPARE - Transportation     Lack of Transportation (Medical): No     Lack of Transportation (Non-Medical): No   Housing Stability: Low Risk  (12/6/2024)    Housing Stability Vital Sign     Unable to Pay for Housing in the Last Year: No     Number of Times Moved in the Last Year: 0     Homeless in the Last Year: No   Utilities: Not At Risk (12/6/2024)    [facility] Utilities     Threatened with loss of utilities: No     No results found.    Objective   /70 (BP Location: Left arm, Patient Position: Sitting, Cuff Size: Standard)   Pulse 65   Temp (!) 97.2 °F (36.2 °C) (Temporal)   Resp 12   Ht 5' 6\" (1.676 m)   Wt 71.7 kg (158 lb)   SpO2 98%   BMI 25.50 kg/m²     Physical Exam  Vitals reviewed.   Constitutional:       Appearance: He is well-developed.   HENT:      Head: Normocephalic and atraumatic.      Right Ear: Tympanic membrane, ear canal and external ear normal.      Left Ear: Tympanic membrane, ear canal and external ear normal.      Nose: Nose normal.      Mouth/Throat:      Mouth: Mucous membranes are moist.   Eyes:      Conjunctiva/sclera: Conjunctivae normal.      Pupils: Pupils are equal, round, and reactive to light.   Cardiovascular:      Rate and Rhythm: Normal rate and regular rhythm.      Heart sounds: Normal heart sounds.   Pulmonary:      Effort: Pulmonary effort is normal.      Breath sounds: Normal breath sounds. No wheezing.   Abdominal:      General: Bowel sounds are normal. There is no distension.      Palpations: Abdomen is soft. There is no mass.      Tenderness: There is no abdominal tenderness.   Musculoskeletal:         General: No tenderness. Normal range of motion.      Cervical back: Normal range of motion and neck supple.    "   Comments: Muscle pain over left clavicle.    Skin:     General: Skin is warm.      Capillary Refill: Capillary refill takes less than 2 seconds.   Neurological:      Mental Status: He is alert and oriented to person, place, and time.      Cranial Nerves: No cranial nerve deficit.      Sensory: No sensory deficit.      Motor: No abnormal muscle tone.      Coordination: Coordination normal.      Deep Tendon Reflexes: Reflexes normal.   Psychiatric:         Behavior: Behavior normal.         Thought Content: Thought content normal.         Judgment: Judgment normal.

## 2024-12-06 NOTE — PATIENT INSTRUCTIONS
Medicare Preventive Visit Patient Instructions  Thank you for completing your Welcome to Medicare Visit or Medicare Annual Wellness Visit today. Your next wellness visit will be due in one year (12/7/2025).  The screening/preventive services that you may require over the next 5-10 years are detailed below. Some tests may not apply to you based off risk factors and/or age. Screening tests ordered at today's visit but not completed yet may show as past due. Also, please note that scanned in results may not display below.  Preventive Screenings:  Service Recommendations Previous Testing/Comments   Colorectal Cancer Screening  Colonoscopy    Fecal Occult Blood Test (FOBT)/Fecal Immunochemical Test (FIT)  Fecal DNA/Cologuard Test  Flexible Sigmoidoscopy Age: 45-75 years old   Colonoscopy: every 10 years (May be performed more frequently if at higher risk)  OR  FOBT/FIT: every 1 year  OR  Cologuard: every 3 years  OR  Sigmoidoscopy: every 5 years  Screening may be recommended earlier than age 45 if at higher risk for colorectal cancer. Also, an individualized decision between you and your healthcare provider will decide whether screening between the ages of 76-85 would be appropriate. Colonoscopy: 01/13/2021  FOBT/FIT: Not on file  Cologuard: Not on file  Sigmoidoscopy: Not on file    Screening Current     Prostate Cancer Screening Individualized decision between patient and health care provider in men between ages of 55-69   Medicare will cover every 12 months beginning on the day after your 50th birthday PSA: 1.06 ng/mL     Screening Not Indicated     Hepatitis C Screening Once for adults born between 1945 and 1965  More frequently in patients at high risk for Hepatitis C Hep C Antibody: Not on file        Diabetes Screening 1-2 times per year if you're at risk for diabetes or have pre-diabetes Fasting glucose: 112 mg/dL (12/2/2024)  A1C: 5.9 % (12/2/2024)  Screening Current   Cholesterol Screening Once every 5 years if  you don't have a lipid disorder. May order more often based on risk factors. Lipid panel: 12/02/2024  Screening Not Indicated  History Lipid Disorder      Other Preventive Screenings Covered by Medicare:  Abdominal Aortic Aneurysm (AAA) Screening: covered once if your at risk. You're considered to be at risk if you have a family history of AAA or a male between the age of 65-75 who smoking at least 100 cigarettes in your lifetime.  Lung Cancer Screening: covers low dose CT scan once per year if you meet all of the following conditions: (1) Age 55-77; (2) No signs or symptoms of lung cancer; (3) Current smoker or have quit smoking within the last 15 years; (4) You have a tobacco smoking history of at least 20 pack years (packs per day x number of years you smoked); (5) You get a written order from a healthcare provider.  Glaucoma Screening: covered annually if you're considered high risk: (1) You have diabetes OR (2) Family history of glaucoma OR (3)  aged 50 and older OR (4)  American aged 65 and older  Osteoporosis Screening: covered every 2 years if you meet one of the following conditions: (1) Have a vertebral abnormality; (2) On glucocorticoid therapy for more than 3 months; (3) Have primary hyperparathyroidism; (4) On osteoporosis medications and need to assess response to drug therapy.  HIV Screening: covered annually if you're between the age of 15-65. Also covered annually if you are younger than 15 and older than 65 with risk factors for HIV infection. For pregnant patients, it is covered up to 3 times per pregnancy.    Immunizations:  Immunization Recommendations   Influenza Vaccine Annual influenza vaccination during flu season is recommended for all persons aged >= 6 months who do not have contraindications   Pneumococcal Vaccine   * Pneumococcal conjugate vaccine = PCV13 (Prevnar 13), PCV15 (Vaxneuvance), PCV20 (Prevnar 20)  * Pneumococcal polysaccharide vaccine = PPSV23  (Pneumovax) Adults 19-65 yo with certain risk factors or if 65+ yo  If never received any pneumonia vaccine: recommend Prevnar 20 (PCV20)  Give PCV20 if previously received 1 dose of PCV13 or PPSV23   Hepatitis B Vaccine 3 dose series if at intermediate or high risk (ex: diabetes, end stage renal disease, liver disease)   Respiratory syncytial virus (RSV) Vaccine - COVERED BY MEDICARE PART D  * RSVPreF3 (Arexvy) CDC recommends that adults 60 years of age and older may receive a single dose of RSV vaccine using shared clinical decision-making (SCDM)   Tetanus (Td) Vaccine - COST NOT COVERED BY MEDICARE PART B Following completion of primary series, a booster dose should be given every 10 years to maintain immunity against tetanus. Td may also be given as tetanus wound prophylaxis.   Tdap Vaccine - COST NOT COVERED BY MEDICARE PART B Recommended at least once for all adults. For pregnant patients, recommended with each pregnancy.   Shingles Vaccine (Shingrix) - COST NOT COVERED BY MEDICARE PART B  2 shot series recommended in those 19 years and older who have or will have weakened immune systems or those 50 years and older     Health Maintenance Due:      Topic Date Due   • Colorectal Cancer Screening  01/13/2026     Immunizations Due:  There are no preventive care reminders to display for this patient.  Advance Directives   What are advance directives?  Advance directives are legal documents that state your wishes and plans for medical care. These plans are made ahead of time in case you lose your ability to make decisions for yourself. Advance directives can apply to any medical decision, such as the treatments you want, and if you want to donate organs.   What are the types of advance directives?  There are many types of advance directives, and each state has rules about how to use them. You may choose a combination of any of the following:  Living will:  This is a written record of the treatment you want. You can  also choose which treatments you do not want, which to limit, and which to stop at a certain time. This includes surgery, medicine, IV fluid, and tube feedings.   Durable power of  for healthcare (DPAHC):  This is a written record that states who you want to make healthcare choices for you when you are unable to make them for yourself. This person, called a proxy, is usually a family member or a friend. You may choose more than 1 proxy.  Do not resuscitate (DNR) order:  A DNR order is used in case your heart stops beating or you stop breathing. It is a request not to have certain forms of treatment, such as CPR. A DNR order may be included in other types of advance directives.  Medical directive:  This covers the care that you want if you are in a coma, near death, or unable to make decisions for yourself. You can list the treatments you want for each condition. Treatment may include pain medicine, surgery, blood transfusions, dialysis, IV or tube feedings, and a ventilator (breathing machine).  Values history:  This document has questions about your views, beliefs, and how you feel and think about life. This information can help others choose the care that you would choose.  Why are advance directives important?  An advance directive helps you control your care. Although spoken wishes may be used, it is better to have your wishes written down. Spoken wishes can be misunderstood, or not followed. Treatments may be given even if you do not want them. An advance directive may make it easier for your family to make difficult choices about your care.   Fall Prevention    Fall prevention  includes ways to make your home and other areas safer. It also includes ways you can move more carefully to prevent a fall. Health conditions that cause changes in your blood pressure, vision, or muscle strength and coordination may increase your risk for falls. Medicines may also increase your risk for falls if they make you  dizzy, weak, or sleepy.   Fall prevention tips:   Stand or sit up slowly.    Use assistive devices as directed.    Wear shoes that fit well and have soles that .    Wear a personal alarm.    Stay active.    Manage your medical conditions.    Home Safety Tips:  Add items to prevent falls in the bathroom.    Keep paths clear.    Install bright lights in your home.    Keep items you use often on shelves within reach.    Paint or place reflective tape on the edges of your stairs.    Weight Management   Why it is important to manage your weight:  Being overweight increases your risk of health conditions such as heart disease, high blood pressure, type 2 diabetes, and certain types of cancer. It can also increase your risk for osteoarthritis, sleep apnea, and other respiratory problems. Aim for a slow, steady weight loss. Even a small amount of weight loss can lower your risk of health problems.  How to lose weight safely:  A safe and healthy way to lose weight is to eat fewer calories and get regular exercise. You can lose up about 1 pound a week by decreasing the number of calories you eat by 500 calories each day.   Healthy meal plan for weight management:  A healthy meal plan includes a variety of foods, contains fewer calories, and helps you stay healthy. A healthy meal plan includes the following:  Eat whole-grain foods more often.  A healthy meal plan should contain fiber. Fiber is the part of grains, fruits, and vegetables that is not broken down by your body. Whole-grain foods are healthy and provide extra fiber in your diet. Some examples of whole-grain foods are whole-wheat breads and pastas, oatmeal, brown rice, and bulgur.  Eat a variety of vegetables every day.  Include dark, leafy greens such as spinach, kale, carmencita greens, and mustard greens. Eat yellow and orange vegetables such as carrots, sweet potatoes, and winter squash.   Eat a variety of fruits every day.  Choose fresh or canned fruit (canned  in its own juice or light syrup) instead of juice. Fruit juice has very little or no fiber.  Eat low-fat dairy foods.  Drink fat-free (skim) milk or 1% milk. Eat fat-free yogurt and low-fat cottage cheese. Try low-fat cheeses such as mozzarella and other reduced-fat cheeses.  Choose meat and other protein foods that are low in fat.  Choose beans or other legumes such as split peas or lentils. Choose fish, skinless poultry (chicken or turkey), or lean cuts of red meat (beef or pork). Before you cook meat or poultry, cut off any visible fat.   Use less fat and oil.  Try baking foods instead of frying them. Add less fat, such as margarine, sour cream, regular salad dressing and mayonnaise to foods. Eat fewer high-fat foods. Some examples of high-fat foods include french fries, doughnuts, ice cream, and cakes.  Eat fewer sweets.  Limit foods and drinks that are high in sugar. This includes candy, cookies, regular soda, and sweetened drinks.  Exercise:  Exercise at least 30 minutes per day on most days of the week. Some examples of exercise include walking, biking, dancing, and swimming. You can also fit in more physical activity by taking the stairs instead of the elevator or parking farther away from stores. Ask your healthcare provider about the best exercise plan for you.      © Copyright [employer] 2018 Information is for End User's use only and may not be sold, redistributed or otherwise used for commercial purposes. All illustrations and images included in CareNotes® are the copyrighted property of [employer] or [employer]

## 2024-12-10 NOTE — PROGRESS NOTES
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   Previously patient had undergone phlebotomies with normalization of the liver function tests.  Up until recently, patient had not undergone a therapeutic phlebotomy for a number of years.  Mr. Nunez more recently has undergone phlebotomy but was donating blood through his Yarsani.    Presently Mr. Nunez feels well and clinically there are no concerning findings.  Recent blood work including CBC parameters and iron studies are all good/acceptable.  Ferritin is acceptable.  The plan is to continue with surveillance.  From a hematology standpoint, patient can give another unit of blood through his Yarsani if he wishes.    Patient is to return in 1 year with repeat blood work before.  Routine health maintenance and medical care is up-to-date.    Patient knows to call the hematology/oncology office if there are any other questions or concerns.    Carefully review your medication list and verify that the list is accurate and up-to-date. Please call the hematology/oncology office if there are medications missing from the list, medications on the list that you are not currently taking or if there is a dosage or instruction that is different from how you're taking that medication.    Patient goals and areas of care: Monitor CBC and iron studies  Barriers to care:   None  Patient is able to self-care  ____________________________________________________________________________________________________    Chief Complaint   Patient presents with    Follow-up    History of hemochromatosis mutation     History of Present Illness: 84-year-old male for seen in August 2012 for evaluation of abnormal liver function test.  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  After  discussing options, patient began phlebotomies.   At that time, liver function tests return to normal.  Patient returns for follow-up.    Mr. Nunez feels well from a physical standpoint.  Appetite is good, weight is stable.  No shortness of breath or dyspnea on exertion.  No headaches, blurred vision or dizziness.  No GI or  issues.  Activities are baseline. Routine health maintenance and medical care is up-to-date.      Note, patient's daughter just found to have elevated iron studies.  Workup is in process.    Review of Systems   Constitutional: Negative.    HENT: Negative.     Eyes: Negative.    Respiratory: Negative.     Cardiovascular: Negative.    Gastrointestinal: Negative.    Endocrine: Negative.    Genitourinary: Negative.    Musculoskeletal: Negative.    Skin: Negative.    Allergic/Immunologic: Negative.    Neurological: Negative.    Hematological: Negative.    Psychiatric/Behavioral: Negative.     All other systems reviewed and are negative.     Patient Active Problem List   Diagnosis    CAD, multiple vessel    S/P CABG x 4    Hyperlipidemia    Methylenetetrahydrofolate reductase deficiency (HCC)    Other hemochromatosis    Coronary artery disease involving native coronary artery of native heart with angina pectoris (HCC)    Stage 3a chronic kidney disease (HCC)    Benign prostatic hyperplasia without lower urinary tract symptoms    Depression, recurrent (HCC)     Past Medical History:   Diagnosis Date    Allergic     Arthritis     Benign essential hypertension     Chronic kidney disease     Coronary artery disease     Hearing problem     HL (hearing loss)     Hx of CABG     Hypercholesteremia     Hypertension     Wears glasses     as needed    Wears hearing aid     bilateral aids     Past Surgical History:   Procedure Laterality Date    COLONOSCOPY  01/13/2021    CORONARY ARTERY BYPASS GRAFT      triple    EGD      stomach polyp    EYE SURGERY  3/2021    HERNIA REPAIR Bilateral     inguinal    KNEE  SURGERY Right     ligament    LASIK  2000    UT XCAPSL CTRC RMVL INSJ IO LENS PROSTH W/O ECP Left 2021    Procedure: EXTRACTION EXTRACAPSULAR CATARACT PHACO INTRAOCULAR LENS (IOL);  Surgeon: Yonis Sena MD;  Location: [facility] MAIN OR;  Service: Ophthalmology    UT XCAPSL CTRC RMVL INSJ IO LENS PROSTH W/O ECP Right 03/15/2021    Procedure: EXTRACTION EXTRACAPSULAR CATARACT PHACO INTRAOCULAR LENS (IOL);  Surgeon: Yonis Sena MD;  Location: [facility] MAIN OR;  Service: Ophthalmology     Family History   Problem Relation Age of Onset    Hypertension Mother     Dementia Mother     Coronary artery disease Father     Stroke Father     Heart disease Father     Hearing loss Father     Cancer Father     Aneurysm Brother         abdominal aortic aneurysm     Social History     Socioeconomic History    Marital status: /Civil Union     Spouse name: Not on file    Number of children: Not on file    Years of education: Not on file    Highest education level: Not on file   Occupational History    Not on file   Tobacco Use    Smoking status: Former     Current packs/day: 0.00     Average packs/day: 1 pack/day for 12.8 years (12.8 ttl pk-yrs)     Types: Cigarettes     Start date: 1957     Quit date: 1970     Years since quittin.7    Smokeless tobacco: Never   Vaping Use    Vaping status: Never Used   Substance and Sexual Activity    Alcohol use: Not Currently     Comment: quit     Drug use: Never    Sexual activity: Not on file   Other Topics Concern    Not on file   Social History Narrative    Not on file     Social Drivers of Health     Financial Resource Strain: Low Risk  (2023)    Overall Financial Resource Strain (CARDIA)     Difficulty of Paying Living Expenses: Not very hard   Food Insecurity: No Food Insecurity (2024)    Hunger Vital Sign     Worried About Running Out of Food in the Last Year: Never true     Ran Out of Food in the Last Year: Never true   Transportation Needs: No  Transportation Needs (12/6/2024)    PRAPARE - Transportation     Lack of Transportation (Medical): No     Lack of Transportation (Non-Medical): No   Physical Activity: Not on file   Stress: Not on file   Social Connections: Not on file   Intimate Partner Violence: Not on file   Housing Stability: Low Risk  (12/6/2024)    Housing Stability Vital Sign     Unable to Pay for Housing in the Last Year: No     Number of Times Moved in the Last Year: 0     Homeless in the Last Year: No       Current Outpatient Medications:     aspirin 81 MG tablet, Take 1 tablet by mouth daily at bedtime , Disp: , Rfl:     atorvastatin (LIPITOR) 10 mg tablet, TAKE 1 TABLET BY MOUTH DAILY, Disp: 100 tablet, Rfl: 1    ciclopirox (LOPROX) 0.77 % SUSP, Apply 1 Application topically 2 (two) times a day (Patient not taking: Reported on 6/6/2024), Disp: 30 mL, Rfl: 2    Coenzyme Q10 (CoQ10) 100 MG CAPS, Take by mouth, Disp: , Rfl:     escitalopram (LEXAPRO) 10 mg tablet, Take 1 tablet (10 mg total) by mouth daily, Disp: 90 tablet, Rfl: 0    finasteride (PROSCAR) 5 mg tablet, Take 1 tablet (5 mg total) by mouth daily, Disp: 90 tablet, Rfl: 3    losartan (COZAAR) 25 mg tablet, Take 1 tablet daily in the AM, Disp: 90 tablet, Rfl: 2    losartan (COZAAR) 50 mg tablet, Take 1 tablet (50 mg total) by mouth daily at bedtime, Disp: 90 tablet, Rfl: 1    tamsulosin (FLOMAX) 0.4 mg, Take 1 capsule (0.4 mg total) by mouth daily with dinner, Disp: 90 capsule, Rfl: 1    trospium chloride (SANCTURA) 20 mg tablet, Take 1 tablet (20 mg total) by mouth daily after dinner, Disp: 90 tablet, Rfl: 1    VITAMIN D PO, Take 2,000 Units by mouth every morning , Disp: , Rfl:     Allergies   Allergen Reactions    Acetaminophen Throat Swelling    Amoxicillin Rash    Lisinopril Cough     Vitals:    12/11/24 1017   BP: 117/70   Pulse: 71   Resp: 15   Temp: (!) 97.4 °F (36.3 °C)   SpO2: 97%     Physical Exam  Constitutional:       Appearance: He is well-developed.      Comments:  Well-nourished male, no respiratory distress   HENT:      Head: Normocephalic and atraumatic.      Right Ear: External ear normal.      Left Ear: External ear normal.   Eyes:      Conjunctiva/sclera: Conjunctivae normal.      Pupils: Pupils are equal, round, and reactive to light.   Neck:      Comments: Supple, no JVD  Cardiovascular:      Rate and Rhythm: Normal rate and regular rhythm.      Heart sounds: Normal heart sounds.   Pulmonary:      Effort: Pulmonary effort is normal.      Breath sounds: Normal breath sounds.   Abdominal:      General: Bowel sounds are normal.      Palpations: Abdomen is soft.      Comments: Soft, nontender, +bowel sounds, no hepatosplenomegaly   Musculoskeletal:         General: Normal range of motion.      Cervical back: Normal range of motion and neck supple.      Comments: Full range of motion in all 4 extremities, no pain or tenderness with palpation of joints, muscles or bones   Skin:     General: Skin is warm.      Comments: Warm, moist, good color, no petechiae or ecchymoses   Neurological:      Mental Status: He is alert and oriented to person, place, and time.      Deep Tendon Reflexes: Reflexes are normal and symmetric.   Psychiatric:         Behavior: Behavior normal.         Thought Content: Thought content normal.         Judgment: Judgment normal.     Extremities:   No lower extremity edema bilaterally, no cords, pulses are 1+  Lymphatics:   No adenopathy in the neck, supraclavicular region, axilla and groin bilaterally    Labs    12/2/2024 ferritin = 61 TIBC = 290 iron saturation = 23% iron = 67    12/2/2024 WBC = 6.95 hemoglobin = 13.8 hematocrit = 43.3 MCV = 90 platelet = 246 neutrophil = 66% BUN = 25 creatinine = 1.21 calcium = 8.5 AST = 14 ALT = 14 alkaline phosphatase = 47 total bilirubin = 0.62        11/21/2023 WBC = 6.47 hemoglobin = 14.4 hematocrit = 43 platelet = 263 neutrophil = 66% BUN = 18 creatinine = 1.06 calcium = 9.0 LFTs WNL    Imaging    MRI of the  abdomen with and without contrast performed on June 22, 2012 demonstrated 2 subcentimeter benign hemangiomas in the right hepatic lobe. Patient also had hepatic steatosis.    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

## 2024-12-11 ENCOUNTER — OFFICE VISIT (OUTPATIENT)
Dept: HEMATOLOGY ONCOLOGY | Facility: MEDICAL CENTER | Age: 84
End: 2024-12-11
Payer: COMMERCIAL

## 2024-12-11 VITALS
HEIGHT: 66 IN | DIASTOLIC BLOOD PRESSURE: 70 MMHG | RESPIRATION RATE: 15 BRPM | BODY MASS INDEX: 25.39 KG/M2 | HEART RATE: 71 BPM | WEIGHT: 158 LBS | OXYGEN SATURATION: 97 % | TEMPERATURE: 97.4 F | SYSTOLIC BLOOD PRESSURE: 117 MMHG

## 2024-12-11 DIAGNOSIS — E78.5 HYPERLIPIDEMIA, UNSPECIFIED HYPERLIPIDEMIA TYPE: ICD-10-CM

## 2024-12-11 DIAGNOSIS — R35.0 URINE FREQUENCY: ICD-10-CM

## 2024-12-11 DIAGNOSIS — E83.118 OTHER HEMOCHROMATOSIS: Primary | ICD-10-CM

## 2024-12-11 PROCEDURE — 99214 OFFICE O/P EST MOD 30 MIN: CPT | Performed by: INTERNAL MEDICINE

## 2024-12-12 RX ORDER — TAMSULOSIN HYDROCHLORIDE 0.4 MG/1
0.4 CAPSULE ORAL
Qty: 90 CAPSULE | Refills: 1 | Status: SHIPPED | OUTPATIENT
Start: 2024-12-12

## 2024-12-12 RX ORDER — ATORVASTATIN CALCIUM 10 MG/1
10 TABLET, FILM COATED ORAL DAILY
Qty: 90 TABLET | Refills: 1 | Status: SHIPPED | OUTPATIENT
Start: 2024-12-12

## 2024-12-16 ENCOUNTER — OFFICE VISIT (OUTPATIENT)
Dept: UROLOGY | Facility: CLINIC | Age: 84
End: 2024-12-16
Payer: COMMERCIAL

## 2024-12-16 VITALS
HEIGHT: 66 IN | DIASTOLIC BLOOD PRESSURE: 70 MMHG | SYSTOLIC BLOOD PRESSURE: 142 MMHG | WEIGHT: 159 LBS | HEART RATE: 62 BPM | BODY MASS INDEX: 25.55 KG/M2 | OXYGEN SATURATION: 98 %

## 2024-12-16 DIAGNOSIS — N13.8 BPH WITH OBSTRUCTION/LOWER URINARY TRACT SYMPTOMS: Primary | ICD-10-CM

## 2024-12-16 DIAGNOSIS — N40.1 BPH WITH OBSTRUCTION/LOWER URINARY TRACT SYMPTOMS: Primary | ICD-10-CM

## 2024-12-16 LAB — POST-VOID RESIDUAL VOLUME, ML POC: 11 ML

## 2024-12-16 PROCEDURE — 99213 OFFICE O/P EST LOW 20 MIN: CPT

## 2024-12-16 PROCEDURE — 51798 US URINE CAPACITY MEASURE: CPT

## 2024-12-16 NOTE — PROGRESS NOTES
Office Visit- Urology  Kristopher Nunez 1940 MRN: [id number]      Assessment/Discussion/Plan    84 y.o. male managed by     BPH  - patient maintained on Flomax 0.4 mg and finasteride 5 mg  -Patient was initiated on trospium 20 mg by family doctor  -Unfortunately with both finasteride and trospium patient has not noticed significant change in his nocturia he still reports that 3-4 times a night.  -He denies history of sleep apnea stating that he has previously been tested and came back negative he denies bladder irritant intake before bed he denies lower extremity swelling  -Discussed that patient can stick with status quo or can consider second line intervention beyond pharmacotherapy including pelvic floor physical therapy or surgical intervention/surgical evaluation.  At this point in time patient wants to stick with status quo.  Did inform him that he give a couple more weeks of treatment and try there is no significant change he can consider discontinuing as this could cause dry eyes, dry mouth, constipation.  Explained possible cognitive effects with use of trospium and that is felt to be the safest antispasmodic due to not crossing the blood-brain barrier  -He expressed understanding  -Follow-up in a year    2.  Prostate cancer screening  -PSA last measured at 1.06 in 2023  -Prostate examination at time of initial consult was negative by physician  -Offered updated prostate cancer screening but did discuss AUA guidelines for screening.  At this point in time patient defers updated screening and given patient's age of 84 and AUA guidelines that he that this is reasonable can readdress at follow-up in a year      Chief Complaint:   Kristopher is a 84 y.o. male presenting to the office for a follow up visit regarding BPH        Subjective    Patient is a 84-year-old male with history of BPH with lower tract symptoms who presents to the office today for follow-up.  Last seen by Dr. Suarez in June 2024.  He has  a history of documented obstructive symptoms he also notes nocturia which today is his most bothersome symptom and reports activation every 2 hours up to 3-4 times a night.  At last visit with Dr. Suarez he was initiated on finasteride he has not noticed significant benefit from use of finasteride his family doctor did trial trospium 20 mg at night.  This was not significantly helpful in reduction of nocturia.    AUA SYMPTOM SCORE      Flowsheet Row Most Recent Value   AUA SYMPTOM SCORE    How often have you had a sensation of not emptying your bladder completely after you finished urinating? 1 (P)     How often have you had to urinate again less than two hours after you finished urinating? 1 (P)     How often have you found you stopped and started again several times when you urinate? 1 (P)     How often have you found it difficult to postpone urination? 0 (P)     How often have you had a weak urinary stream? 1 (P)     How often have you had to push or strain to begin urination? 0 (P)     How many times did you most typically get up to urinate from the time you went to bed at night until the time you got up in the morning? 2 (P)     Quality of Life: If you were to spend the rest of your life with your urinary condition just the way it is now, how would you feel about that? 2 (P)     AUA SYMPTOM SCORE 6 (P)              ROS:   Review of Systems   Constitutional: Negative.  Negative for chills, fatigue and fever.   HENT: Negative.     Respiratory:  Negative for shortness of breath.    Cardiovascular:  Negative for chest pain.   Gastrointestinal: Negative.  Negative for abdominal pain.   Endocrine: Negative.    Musculoskeletal: Negative.    Skin: Negative.    Neurological: Negative.  Negative for dizziness and light-headedness.   Hematological: Negative.    Psychiatric/Behavioral: Negative.           Past Medical History  Past Medical History:   Diagnosis Date    Allergic     Arthritis     Benign essential  hypertension     Chronic kidney disease     Coronary artery disease     Hearing problem     HL (hearing loss)     Hx of CABG     Hypercholesteremia     Hypertension     Wears glasses     as needed    Wears hearing aid     bilateral aids       Past Surgical History  Past Surgical History:   Procedure Laterality Date    COLONOSCOPY  2021    CORONARY ARTERY BYPASS GRAFT      triple    EGD      stomach polyp    EYE SURGERY  3/2021    HERNIA REPAIR Bilateral     inguinal    KNEE SURGERY Right     ligament    LASIK  2000    TX XCAPSL CTRC RMVL INSJ IO LENS PROSTH W/O ECP Left 2021    Procedure: EXTRACTION EXTRACAPSULAR CATARACT PHACO INTRAOCULAR LENS (IOL);  Surgeon: Yonis Sena MD;  Location: [facility] MAIN OR;  Service: Ophthalmology    TX XCAPSL CTRC RMVL INSJ IO LENS PROSTH W/O ECP Right 03/15/2021    Procedure: EXTRACTION EXTRACAPSULAR CATARACT PHACO INTRAOCULAR LENS (IOL);  Surgeon: Yonis Sena MD;  Location: [facility] MAIN OR;  Service: Ophthalmology       Past Family History  Family History   Problem Relation Age of Onset    Hypertension Mother     Dementia Mother     Coronary artery disease Father     Stroke Father     Heart disease Father     Hearing loss Father     Cancer Father     Aneurysm Brother         abdominal aortic aneurysm       Past Social history  Social History     Socioeconomic History    Marital status: /Civil Union     Spouse name: Not on file    Number of children: Not on file    Years of education: Not on file    Highest education level: Not on file   Occupational History    Not on file   Tobacco Use    Smoking status: Former     Current packs/day: 0.00     Average packs/day: 1 pack/day for 12.8 years (12.8 ttl pk-yrs)     Types: Cigarettes     Start date: 1957     Quit date: 1970     Years since quittin.7    Smokeless tobacco: Never   Vaping Use    Vaping status: Never Used   Substance and Sexual Activity    Alcohol use: Not Currently     Comment: quit      Drug use: Never    Sexual activity: Not on file   Other Topics Concern    Not on file   Social History Narrative    Not on file     Social Drivers of Health     Financial Resource Strain: Low Risk  (12/4/2023)    Overall Financial Resource Strain (CARDIA)     Difficulty of Paying Living Expenses: Not very hard   Food Insecurity: No Food Insecurity (12/6/2024)    Hunger Vital Sign     Worried About Running Out of Food in the Last Year: Never true     Ran Out of Food in the Last Year: Never true   Transportation Needs: No Transportation Needs (12/6/2024)    PRAPARE - Transportation     Lack of Transportation (Medical): No     Lack of Transportation (Non-Medical): No   Physical Activity: Not on file   Stress: Not on file   Social Connections: Not on file   Intimate Partner Violence: Not on file   Housing Stability: Low Risk  (12/6/2024)    Housing Stability Vital Sign     Unable to Pay for Housing in the Last Year: No     Number of Times Moved in the Last Year: 0     Homeless in the Last Year: No       Current Medications  Current Outpatient Medications   Medication Sig Dispense Refill    aspirin 81 MG tablet Take 1 tablet by mouth daily at bedtime       atorvastatin (LIPITOR) 10 mg tablet TAKE 1 TABLET BY MOUTH DAILY 90 tablet 1    Coenzyme Q10 (CoQ10) 100 MG CAPS Take by mouth      escitalopram (LEXAPRO) 10 mg tablet Take 1 tablet (10 mg total) by mouth daily 90 tablet 0    finasteride (PROSCAR) 5 mg tablet Take 1 tablet (5 mg total) by mouth daily 90 tablet 3    losartan (COZAAR) 25 mg tablet Take 1 tablet daily in the AM 90 tablet 2    losartan (COZAAR) 50 mg tablet Take 1 tablet (50 mg total) by mouth daily at bedtime 90 tablet 1    tamsulosin (FLOMAX) 0.4 mg TAKE 1 CAPSULE BY MOUTH DAILY  WITH DINNER 90 capsule 1    trospium chloride (SANCTURA) 20 mg tablet Take 1 tablet (20 mg total) by mouth daily after dinner 90 tablet 1    VITAMIN D PO Take 2,000 Units by mouth every morning       ciclopirox (LOPROX) 0.77  "% SUSP Apply 1 Application topically 2 (two) times a day (Patient not taking: Reported on 6/6/2024) 30 mL 2     No current facility-administered medications for this visit.       Allergies  Allergies   Allergen Reactions    Acetaminophen Throat Swelling    Amoxicillin Rash    Lisinopril Cough       OBJECTIVE    Vitals   Vitals:    12/16/24 1034   BP: 142/70   BP Location: Left arm   Patient Position: Sitting   Cuff Size: Adult   Pulse: 62   SpO2: 98%   Weight: 72.1 kg (159 lb)   Height: 5' 6\" (1.676 m)       PVR:    Physical Exam  Constitutional:       General: He is not in acute distress.     Appearance: Normal appearance. He is normal weight. He is not ill-appearing or toxic-appearing.   HENT:      Head: Normocephalic and atraumatic.   Eyes:      Conjunctiva/sclera: Conjunctivae normal.   Cardiovascular:      Rate and Rhythm: Normal rate.   Pulmonary:      Effort: Pulmonary effort is normal. No respiratory distress.   Skin:     General: Skin is warm and dry.   Neurological:      General: No focal deficit present.      Mental Status: He is alert and oriented to person, place, and time.      Cranial Nerves: No cranial nerve deficit.   Psychiatric:         Mood and Affect: Mood normal.         Behavior: Behavior normal.         Thought Content: Thought content normal.          Labs:     Lab Results   Component Value Date    PSA 1.06 11/21/2023    PSA 1.0 11/28/2022    PSA 1.2 12/02/2021    PSA 1.6 11/30/2020     Lab Results   Component Value Date    CREATININE 1.21 12/02/2024      Lab Results   Component Value Date    HGBA1C 5.9 (H) 12/02/2024     Lab Results   Component Value Date    GLUCOSE 105 10/23/2015    CALCIUM 8.5 12/02/2024     10/23/2015    K 4.0 12/02/2024    CO2 26 12/02/2024     12/02/2024    BUN 25 12/02/2024    CREATININE 1.21 12/02/2024       I have personally reviewed all pertinent lab results and reviewed with patient    Imaging       Jarrett Humphries PA-C  Date: 12/16/2024 Time: " 10:36 AM  [facility]    This note was written using fluency dictation software. Please excuse any resulting minor grammatical errors.

## 2025-01-02 DIAGNOSIS — R05.9 COUGH, UNSPECIFIED TYPE: Primary | ICD-10-CM

## 2025-01-06 ENCOUNTER — TELEPHONE (OUTPATIENT)
Age: 85
End: 2025-01-06

## 2025-01-06 DIAGNOSIS — I10 ESSENTIAL HYPERTENSION: ICD-10-CM

## 2025-01-06 RX ORDER — LOSARTAN POTASSIUM 25 MG/1
TABLET ORAL
Qty: 270 TABLET | Refills: 0 | Status: SHIPPED | OUTPATIENT
Start: 2025-01-06

## 2025-01-06 NOTE — TELEPHONE ENCOUNTER
Pt called back to say [facility] does not have the losartan 50 mg made by  [facility], they only have the 25 mg. Please send the losartan 25 mg taking one in the am and two in the pm as soon as possible.

## 2025-01-06 NOTE — TELEPHONE ENCOUNTER
Patient calling stating he has had a cough since November and think it is a side effect of his losartan.  Patient stating he did not have a cough when it was a different .  He has talked to [facility] and got the name of  that the medication needs to come from. He thinks it will help.    He would like a losartan prescription sent to [facility].    Losartan 25mg and 50mg  [facility]  ONLY

## 2025-03-02 DIAGNOSIS — F33.9 DEPRESSION, RECURRENT (HCC): ICD-10-CM

## 2025-03-03 RX ORDER — ESCITALOPRAM OXALATE 10 MG/1
10 TABLET ORAL DAILY
Qty: 90 TABLET | Refills: 1 | Status: SHIPPED | OUTPATIENT
Start: 2025-03-03

## 2025-03-18 ENCOUNTER — APPOINTMENT (OUTPATIENT)
Dept: RADIOLOGY | Facility: CLINIC | Age: 85
End: 2025-03-18
Payer: COMMERCIAL

## 2025-03-18 DIAGNOSIS — R05.3 CHRONIC COUGH: ICD-10-CM

## 2025-03-18 PROCEDURE — 71046 X-RAY EXAM CHEST 2 VIEWS: CPT

## 2025-04-06 DIAGNOSIS — N40.1 BENIGN PROSTATIC HYPERPLASIA WITH NOCTURIA: ICD-10-CM

## 2025-04-06 DIAGNOSIS — N13.8 BPH WITH OBSTRUCTION/LOWER URINARY TRACT SYMPTOMS: ICD-10-CM

## 2025-04-06 DIAGNOSIS — N40.1 BPH WITH OBSTRUCTION/LOWER URINARY TRACT SYMPTOMS: ICD-10-CM

## 2025-04-06 DIAGNOSIS — R35.1 BENIGN PROSTATIC HYPERPLASIA WITH NOCTURIA: ICD-10-CM

## 2025-04-07 RX ORDER — TROSPIUM CHLORIDE 20 MG/1
20 TABLET, FILM COATED ORAL
Qty: 90 TABLET | Refills: 1 | Status: SHIPPED | OUTPATIENT
Start: 2025-04-07

## 2025-04-07 RX ORDER — FINASTERIDE 5 MG/1
5 TABLET, FILM COATED ORAL DAILY
Qty: 90 TABLET | Refills: 1 | Status: SHIPPED | OUTPATIENT
Start: 2025-04-07

## 2025-04-15 ENCOUNTER — OFFICE VISIT (OUTPATIENT)
Dept: SURGERY | Facility: CLINIC | Age: 85
End: 2025-04-15
Payer: COMMERCIAL

## 2025-04-15 VITALS
HEART RATE: 77 BPM | TEMPERATURE: 97 F | BODY MASS INDEX: 25.55 KG/M2 | WEIGHT: 159 LBS | OXYGEN SATURATION: 98 % | DIASTOLIC BLOOD PRESSURE: 80 MMHG | HEIGHT: 66 IN | SYSTOLIC BLOOD PRESSURE: 140 MMHG

## 2025-04-15 DIAGNOSIS — S76.211A STRAIN OF RIGHT GROIN: ICD-10-CM

## 2025-04-15 DIAGNOSIS — K40.90 RIGHT INGUINAL HERNIA: ICD-10-CM

## 2025-04-15 DIAGNOSIS — Z01.818 PREOPERATIVE EXAMINATION: Primary | ICD-10-CM

## 2025-04-15 PROCEDURE — 99204 OFFICE O/P NEW MOD 45 MIN: CPT | Performed by: SURGERY

## 2025-04-15 NOTE — PROGRESS NOTES
Name: Kristopher Nunez      : 1940      MRN: [id number]  Encounter Provider: Deven Prabhakar MD  Encounter Date: 4/15/2025   Encounter department: [facility] GENERAL SURGERY [place]  :  Assessment & Plan  Strain of right groin         Patient has a right groin strain due to coughing.  His previous bilateral inguinal hernia repair with mesh is intact with no recurrent hernia    History of Present Illness   HPI  Kristopher Nunez is a 84 y.o. male who presents with referral from his PCP, Dr. Machuca, for a right inguinal hernia.  Patient states has been there for quite some time.  Became more symptomatic with a recent chronic cough, but this cough has resolved now completely on treatment.  He reports with his recent coughing episode he had a sharp stabbing pain in the right groin.  The pain has resolved.  He denies any bulge.    No bowel or bladder obstructive complaints.    Patient's medical history is notable for CABG x 4 and he follows up with cardiology regularly.  Last appointment was 2024 was doing well with no significant changes in his treatment.  See cardiology note dated 2024  CKD stage III stable.  He currently has excellent functional status.          Review of Systems   Constitutional: Negative.    HENT: Negative.     Respiratory: Negative.     Cardiovascular:         See HPI last cardiology evaluation 2020 for   Gastrointestinal: Negative.    Genitourinary:         CKD stable   Musculoskeletal: Negative.    Neurological: Negative.    Hematological: Negative.    All other systems reviewed and are negative.    Medical History Reviewed by provider this encounter:     .  Past Medical History   Past Medical History:   Diagnosis Date    Allergic     Arthritis     Benign essential hypertension     Chronic kidney disease     Coronary artery disease     Hearing problem     HL (hearing loss)     Hx of CABG     Hypercholesteremia     Hypertension     Wears glasses     as  needed    Wears hearing aid     bilateral aids     Past Surgical History:   Procedure Laterality Date    COLONOSCOPY  01/13/2021    CORONARY ARTERY BYPASS GRAFT      triple    EGD      stomach polyp    EYE SURGERY  3/2021    HERNIA REPAIR Bilateral     inguinal    KNEE SURGERY Right     ligament    LASIK  2000    SD XCAPSL CTRC RMVL INSJ IO LENS PROSTH W/O ECP Left 02/08/2021    Procedure: EXTRACTION EXTRACAPSULAR CATARACT PHACO INTRAOCULAR LENS (IOL);  Surgeon: Yonis Sena MD;  Location: [facility] MAIN OR;  Service: Ophthalmology    SD XCAPSL CTRC RMVL INSJ IO LENS PROSTH W/O ECP Right 03/15/2021    Procedure: EXTRACTION EXTRACAPSULAR CATARACT PHACO INTRAOCULAR LENS (IOL);  Surgeon: Yonis Sena MD;  Location: [facility] [place] MAIN OR;  Service: Ophthalmology     Family History   Problem Relation Age of Onset    Hypertension Mother     Dementia Mother     Coronary artery disease Father     Stroke Father     Heart disease Father     Hearing loss Father     Cancer Father     Aneurysm Brother         abdominal aortic aneurysm      reports that he quit smoking about 55 years ago. His smoking use included cigarettes. He started smoking about 67 years ago. He has a 12.8 pack-year smoking history. He has never used smokeless tobacco. He reports that he does not currently use alcohol. He reports that he does not use drugs.  Current Outpatient Medications   Medication Instructions    aspirin 81 MG tablet 1 tablet, Daily at bedtime    atorvastatin (LIPITOR) 10 mg, Oral, Daily    azelastine (ASTELIN) 0.1 % nasal spray 1-2 sprays, Nasal, 2 times daily PRN, Use in each nostril as directed    ciclopirox (LOPROX) 0.77 % SUSP 1 Application, Topical, 2 times daily    Coenzyme Q10 (CoQ10) 100 MG CAPS Take by mouth    escitalopram (LEXAPRO) 10 mg, Oral, Daily    finasteride (PROSCAR) 5 mg, Oral, Daily    fluticasone (FLONASE) 50 mcg/act nasal spray 1 spray, Nasal, Daily PRN    Fluticasone-Salmeterol (Advair) 100-50 mcg/dose inhaler 1 puff,  Inhalation, Every 12 hours, Rinse mouth after use.    losartan (COZAAR) 25 mg tablet 1 tablet in the AM and 2 tablet in the PM    tamsulosin (FLOMAX) 0.4 mg, Oral, Daily with dinner    trospium chloride (SANCTURA) 20 mg, Oral, Daily after dinner    VITAMIN D PO 2,000 Units, Every morning     Allergies   Allergen Reactions    Acetaminophen Throat Swelling    Amoxicillin Rash    Lisinopril Cough      Current Outpatient Medications on File Prior to Visit   Medication Sig Dispense Refill    aspirin 81 MG tablet Take 1 tablet by mouth daily at bedtime       atorvastatin (LIPITOR) 10 mg tablet TAKE 1 TABLET BY MOUTH DAILY 90 tablet 1    azelastine (ASTELIN) 0.1 % nasal spray 1-2 sprays into each nostril 2 (two) times a day as needed for rhinitis Use in each nostril as directed 30 mL 5    ciclopirox (LOPROX) 0.77 % SUSP Apply 1 Application topically 2 (two) times a day 30 mL 2    Coenzyme Q10 (CoQ10) 100 MG CAPS Take by mouth      escitalopram (LEXAPRO) 10 mg tablet TAKE 1 TABLET BY MOUTH DAILY 90 tablet 1    finasteride (PROSCAR) 5 mg tablet TAKE 1 TABLET BY MOUTH DAILY 90 tablet 1    fluticasone (FLONASE) 50 mcg/act nasal spray 1 spray into each nostril daily as needed for rhinitis 18.2 mL 3    Fluticasone-Salmeterol (Advair) 100-50 mcg/dose inhaler Inhale 1 puff every 12 (twelve) hours Rinse mouth after use. (Patient not taking: Reported on 4/9/2025) 60 blister 5    losartan (COZAAR) 25 mg tablet 1 tablet in the AM and 2 tablet in the  tablet 0    tamsulosin (FLOMAX) 0.4 mg TAKE 1 CAPSULE BY MOUTH DAILY  WITH DINNER 90 capsule 1    trospium chloride (SANCTURA) 20 mg tablet TAKE 1 TABLET BY MOUTH DAILY  AFTER DINNER 90 tablet 1    VITAMIN D PO Take 2,000 Units by mouth every morning        No current facility-administered medications on file prior to visit.      Social History     Tobacco Use    Smoking status: Former     Current packs/day: 0.00     Average packs/day: 1 pack/day for 12.8 years (12.8 ttl pk-yrs)      Types: Cigarettes     Start date: 1957     Quit date: 1970     Years since quittin.0    Smokeless tobacco: Never   Vaping Use    Vaping status: Never Used   Substance and Sexual Activity    Alcohol use: Not Currently     Comment: quit     Drug use: Never    Sexual activity: Not on file        Objective   There were no vitals taken for this visit.     Physical Exam  Vitals reviewed.   Constitutional:       General: He is not in acute distress.     Appearance: Normal appearance.   HENT:      Head: Normocephalic and atraumatic.   Cardiovascular:      Rate and Rhythm: Normal rate and regular rhythm.   Pulmonary:      Effort: Pulmonary effort is normal.      Breath sounds: Normal breath sounds.   Abdominal:      General: Abdomen is flat. There is no distension.      Palpations: Abdomen is soft.      Tenderness: There is no abdominal tenderness.      Comments: Bilateral well-healed inguinal incisions.    Patient has no recurrent hernia on exam   Musculoskeletal:         General: Normal range of motion.      Cervical back: Normal range of motion and neck supple.   Lymphadenopathy:      Cervical: No cervical adenopathy.   Skin:     General: Skin is warm and dry.   Neurological:      General: No focal deficit present.      Mental Status: He is alert.

## 2025-04-15 NOTE — H&P
Name: Kristopher Nunez      : 1940      MRN: [id number]  Encounter Provider: Deven Prabhakar MD  Encounter Date: 4/15/2025   Encounter department: [facility] GENERAL SUR[facility]ERY [place]  :  Assessment & Plan  Umbilical hernia         1.  CBC and CMP 2024 unremarkable.  2.  Last cardiology evaluation 2024 also unremarkable  3.  Plan umbilical hernia repair for small fascial defect    History of Present Illness   HPI  Kristopher Nunez is a 84 y.o. male who presents with referral from his PCP, Dr. Machuca, for an umbilical hernia.  Patient states has been there for quite some time.  Became more symptomatic with a recent chronic cough, but this cough has resolved now completely on treatment.  He presents now for counseling regarding his small umbilical hernia.    No bowel or bladder obstructive complaints.    Patient's medical history is notable for CABG x 4 and he follows up with cardiology regularly.  Last appointment was 2024 was doing well with no significant changes in his treatment.  See cardiology note dated 2024  CKD stage III stable.  He currently has excellent functional status.          Review of Systems   Constitutional: Negative.    HENT: Negative.     Respiratory: Negative.     Cardiovascular:         See HPI last cardiology evaluation 2020 for   Gastrointestinal: Negative.    Genitourinary:         CKD stable   Musculoskeletal: Negative.    Neurological: Negative.    Hematological: Negative.    All other systems reviewed and are negative.    Medical History Reviewed by provider this encounter:     .  Past Medical History   Past Medical History:   Diagnosis Date    Allergic     Arthritis     Benign essential hypertension     Chronic kidney disease     Coronary artery disease     Hearing problem     HL (hearing loss)     Hx of CABG     Hypercholesteremia     Hypertension     Wears glasses     as needed    Wears hearing aid     bilateral aids     Past  Surgical History:   Procedure Laterality Date    COLONOSCOPY  01/13/2021    CORONARY ARTERY BYPASS GRAFT      triple    EGD      stomach polyp    EYE SURGERY  3/2021    HERNIA REPAIR Bilateral     inguinal    KNEE SURGERY Right     ligament    LASIK  2000    TN XCAPSL CTRC RMVL INSJ IO LENS PROSTH W/O ECP Left 02/08/2021    Procedure: EXTRACTION EXTRACAPSULAR CATARACT PHACO INTRAOCULAR LENS (IOL);  Surgeon: Yonis Sena MD;  Location: [facility] MAIN OR;  Service: Ophthalmology    TN XCAPSL CTRC RMVL INSJ IO LENS PROSTH W/O ECP Right 03/15/2021    Procedure: EXTRACTION EXTRACAPSULAR CATARACT PHACO INTRAOCULAR LENS (IOL);  Surgeon: Yonis Sena MD;  Location: [facility] MAIN OR;  Service: Ophthalmology     Family History   Problem Relation Age of Onset    Hypertension Mother     Dementia Mother     Coronary artery disease Father     Stroke Father     Heart disease Father     Hearing loss Father     Cancer Father     Aneurysm Brother         abdominal aortic aneurysm      reports that he quit smoking about 55 years ago. His smoking use included cigarettes. He started smoking about 67 years ago. He has a 12.8 pack-year smoking history. He has never used smokeless tobacco. He reports that he does not currently use alcohol. He reports that he does not use drugs.  Current Outpatient Medications   Medication Instructions    aspirin 81 MG tablet 1 tablet, Daily at bedtime    atorvastatin (LIPITOR) 10 mg, Oral, Daily    azelastine (ASTELIN) 0.1 % nasal spray 1-2 sprays, Nasal, 2 times daily PRN, Use in each nostril as directed    ciclopirox (LOPROX) 0.77 % SUSP 1 Application, Topical, 2 times daily    Coenzyme Q10 (CoQ10) 100 MG CAPS Take by mouth    escitalopram (LEXAPRO) 10 mg, Oral, Daily    finasteride (PROSCAR) 5 mg, Oral, Daily    fluticasone (FLONASE) 50 mcg/act nasal spray 1 spray, Nasal, Daily PRN    Fluticasone-Salmeterol (Advair) 100-50 mcg/dose inhaler 1 puff, Inhalation, Every 12 hours, Rinse mouth after use.     losartan (COZAAR) 25 mg tablet 1 tablet in the AM and 2 tablet in the PM    tamsulosin (FLOMAX) 0.4 mg, Oral, Daily with dinner    trospium chloride (SANCTURA) 20 mg, Oral, Daily after dinner    VITAMIN D PO 2,000 Units, Every morning     Allergies   Allergen Reactions    Acetaminophen Throat Swelling    Amoxicillin Rash    Lisinopril Cough      Current Outpatient Medications on File Prior to Visit   Medication Sig Dispense Refill    aspirin 81 MG tablet Take 1 tablet by mouth daily at bedtime       atorvastatin (LIPITOR) 10 mg tablet TAKE 1 TABLET BY MOUTH DAILY 90 tablet 1    azelastine (ASTELIN) 0.1 % nasal spray 1-2 sprays into each nostril 2 (two) times a day as needed for rhinitis Use in each nostril as directed 30 mL 5    ciclopirox (LOPROX) 0.77 % SUSP Apply 1 Application topically 2 (two) times a day 30 mL 2    Coenzyme Q10 (CoQ10) 100 MG CAPS Take by mouth      escitalopram (LEXAPRO) 10 mg tablet TAKE 1 TABLET BY MOUTH DAILY 90 tablet 1    finasteride (PROSCAR) 5 mg tablet TAKE 1 TABLET BY MOUTH DAILY 90 tablet 1    fluticasone (FLONASE) 50 mcg/act nasal spray 1 spray into each nostril daily as needed for rhinitis 18.2 mL 3    Fluticasone-Salmeterol (Advair) 100-50 mcg/dose inhaler Inhale 1 puff every 12 (twelve) hours Rinse mouth after use. (Patient not taking: Reported on 4/9/2025) 60 blister 5    losartan (COZAAR) 25 mg tablet 1 tablet in the AM and 2 tablet in the  tablet 0    tamsulosin (FLOMAX) 0.4 mg TAKE 1 CAPSULE BY MOUTH DAILY  WITH DINNER 90 capsule 1    trospium chloride (SANCTURA) 20 mg tablet TAKE 1 TABLET BY MOUTH DAILY  AFTER DINNER 90 tablet 1    VITAMIN D PO Take 2,000 Units by mouth every morning        No current facility-administered medications on file prior to visit.      Social History     Tobacco Use    Smoking status: Former     Current packs/day: 0.00     Average packs/day: 1 pack/day for 12.8 years (12.8 ttl pk-yrs)     Types: Cigarettes     Start date: 6/11/1957     Quit  date: 1970     Years since quittin.0    Smokeless tobacco: Never   Vaping Use    Vaping status: Never Used   Substance and Sexual Activity    Alcohol use: Not Currently     Comment: quit     Drug use: Never    Sexual activity: Not on file        Objective   There were no vitals taken for this visit.     Physical Exam  Vitals reviewed.   Constitutional:       General: He is not in acute distress.     Appearance: Normal appearance.   HENT:      Head: Normocephalic and atraumatic.   Cardiovascular:      Rate and Rhythm: Normal rate and regular rhythm.   Pulmonary:      Effort: Pulmonary effort is normal.      Breath sounds: Normal breath sounds.   Abdominal:      General: Abdomen is flat. There is no distension.      Palpations: Abdomen is soft.      Tenderness: There is no abdominal tenderness.      Comments: Small umbilical hernia, reducible   Musculoskeletal:         General: Normal range of motion.      Cervical back: Normal range of motion and neck supple.   Lymphadenopathy:      Cervical: No cervical adenopathy.   Skin:     General: Skin is warm and dry.   Neurological:      General: No focal deficit present.      Mental Status: He is alert.

## 2025-05-22 ENCOUNTER — TELEPHONE (OUTPATIENT)
Age: 85
End: 2025-05-22

## 2025-05-22 DIAGNOSIS — E78.00 PURE HYPERCHOLESTEROLEMIA: ICD-10-CM

## 2025-05-22 DIAGNOSIS — E78.5 HYPERLIPIDEMIA, UNSPECIFIED HYPERLIPIDEMIA TYPE: ICD-10-CM

## 2025-05-22 DIAGNOSIS — R35.1 BENIGN PROSTATIC HYPERPLASIA WITH NOCTURIA: ICD-10-CM

## 2025-05-22 DIAGNOSIS — N40.1 BENIGN PROSTATIC HYPERPLASIA WITH NOCTURIA: ICD-10-CM

## 2025-05-22 DIAGNOSIS — R73.09 ABNORMAL GLUCOSE: ICD-10-CM

## 2025-05-22 DIAGNOSIS — R05.9 COUGH, UNSPECIFIED TYPE: ICD-10-CM

## 2025-05-22 DIAGNOSIS — I25.10 CORONARY ARTERY DISEASE INVOLVING NATIVE CORONARY ARTERY OF NATIVE HEART WITHOUT ANGINA PECTORIS: Primary | ICD-10-CM

## 2025-05-22 DIAGNOSIS — I10 ESSENTIAL HYPERTENSION: Primary | ICD-10-CM

## 2025-05-22 DIAGNOSIS — I10 ESSENTIAL HYPERTENSION: ICD-10-CM

## 2025-05-22 RX ORDER — LOSARTAN POTASSIUM 25 MG/1
TABLET ORAL
Qty: 270 TABLET | Refills: 2 | Status: SHIPPED | OUTPATIENT
Start: 2025-05-22

## 2025-05-22 NOTE — TELEPHONE ENCOUNTER
Medication: Losartan 25 mg    Dose/Frequency: 1 tablet in AM; 2 tabs in PM    Quantity: 270-- for 90-day supply    Pharmacy: [facility]    Office:   [x] PCP/Provider -   [] Speciality/Provider -     Does the patient have enough for 3 days?   [x] Yes   [] No - Send as HP to POD

## 2025-05-22 NOTE — TELEPHONE ENCOUNTER
1.  I am okay with him trying the supplement.  And if his blood pressure gets affected we will notice it on our appointment on June 9  2.  Blood work that is in the computer is not for me is from Dr. Abdulaziz Hernandez.  I did add a cholesterol A1c and CMP for the patient to perform.  Fasting required

## 2025-05-22 NOTE — TELEPHONE ENCOUNTER
Patient called in with several concerns for Primary Care Provider to review and follow up with him:  Supplement from [place] called [facility]; supported by Aguilar Arnett as AM supplement to stimulate patient to be more active; feels he is getting lazy  Upcoming OV 6/9 and checking to see if any labs are due; patient has multiple labs ordered. Are they due to be done prior to OV?  Refill for losartan 25 mg under separate encounter.  Please follow up with patient for provider response.

## 2025-05-22 NOTE — TELEPHONE ENCOUNTER
Received call from pt.  He has an upcoming appointment with Dr. Penaloza 6/6, and wants to know if he needs any blood work prior to visit.  Please advise.

## 2025-05-30 ENCOUNTER — APPOINTMENT (OUTPATIENT)
Dept: LAB | Facility: CLINIC | Age: 85
End: 2025-05-30
Payer: COMMERCIAL

## 2025-05-30 ENCOUNTER — RA CDI HCC (OUTPATIENT)
Dept: OTHER | Facility: HOSPITAL | Age: 85
End: 2025-05-30

## 2025-05-30 DIAGNOSIS — I25.10 CORONARY ARTERY DISEASE INVOLVING NATIVE CORONARY ARTERY OF NATIVE HEART WITHOUT ANGINA PECTORIS: ICD-10-CM

## 2025-05-30 DIAGNOSIS — E78.00 PURE HYPERCHOLESTEROLEMIA: ICD-10-CM

## 2025-05-30 DIAGNOSIS — E78.5 HYPERLIPIDEMIA, UNSPECIFIED HYPERLIPIDEMIA TYPE: ICD-10-CM

## 2025-05-30 DIAGNOSIS — I10 ESSENTIAL HYPERTENSION: ICD-10-CM

## 2025-05-30 DIAGNOSIS — Z12.5 SCREENING PSA (PROSTATE SPECIFIC ANTIGEN): ICD-10-CM

## 2025-05-30 DIAGNOSIS — R73.09 ABNORMAL GLUCOSE: ICD-10-CM

## 2025-05-30 LAB
ALBUMIN SERPL BCG-MCNC: 3.8 G/DL (ref 3.5–5)
ALP SERPL-CCNC: 50 U/L (ref 34–104)
ALT SERPL W P-5'-P-CCNC: 13 U/L (ref 7–52)
ANION GAP SERPL CALCULATED.3IONS-SCNC: 7 MMOL/L (ref 4–13)
AST SERPL W P-5'-P-CCNC: 16 U/L (ref 13–39)
BASOPHILS # BLD AUTO: 0.08 THOUSANDS/ÂΜL (ref 0–0.1)
BASOPHILS NFR BLD AUTO: 2 % (ref 0–1)
BILIRUB SERPL-MCNC: 0.64 MG/DL (ref 0.2–1)
BUN SERPL-MCNC: 19 MG/DL (ref 5–25)
CALCIUM SERPL-MCNC: 8.7 MG/DL (ref 8.4–10.2)
CHLORIDE SERPL-SCNC: 104 MMOL/L (ref 96–108)
CHOLEST SERPL-MCNC: 111 MG/DL (ref ?–200)
CO2 SERPL-SCNC: 26 MMOL/L (ref 21–32)
CREAT SERPL-MCNC: 1.01 MG/DL (ref 0.6–1.3)
EOSINOPHIL # BLD AUTO: 0.24 THOUSAND/ÂΜL (ref 0–0.61)
EOSINOPHIL NFR BLD AUTO: 5 % (ref 0–6)
ERYTHROCYTE [DISTWIDTH] IN BLOOD BY AUTOMATED COUNT: 12.5 % (ref 11.6–15.1)
EST. AVERAGE GLUCOSE BLD GHB EST-MCNC: 126 MG/DL
GFR SERPL CREATININE-BSD FRML MDRD: 68 ML/MIN/1.73SQ M
GLUCOSE P FAST SERPL-MCNC: 107 MG/DL (ref 65–99)
HBA1C MFR BLD: 6 %
HCT VFR BLD AUTO: 41.9 % (ref 36.5–49.3)
HDLC SERPL-MCNC: 30 MG/DL
HGB BLD-MCNC: 13.3 G/DL (ref 12–17)
IMM GRANULOCYTES # BLD AUTO: 0.01 THOUSAND/UL (ref 0–0.2)
IMM GRANULOCYTES NFR BLD AUTO: 0 % (ref 0–2)
LDLC SERPL CALC-MCNC: 66 MG/DL (ref 0–100)
LYMPHOCYTES # BLD AUTO: 1.18 THOUSANDS/ÂΜL (ref 0.6–4.47)
LYMPHOCYTES NFR BLD AUTO: 22 % (ref 14–44)
MCH RBC QN AUTO: 28.2 PG (ref 26.8–34.3)
MCHC RBC AUTO-ENTMCNC: 31.7 G/DL (ref 31.4–37.4)
MCV RBC AUTO: 89 FL (ref 82–98)
MONOCYTES # BLD AUTO: 0.55 THOUSAND/ÂΜL (ref 0.17–1.22)
MONOCYTES NFR BLD AUTO: 10 % (ref 4–12)
NEUTROPHILS # BLD AUTO: 3.23 THOUSANDS/ÂΜL (ref 1.85–7.62)
NEUTS SEG NFR BLD AUTO: 61 % (ref 43–75)
NONHDLC SERPL-MCNC: 81 MG/DL
NRBC BLD AUTO-RTO: 0 /100 WBCS
PLATELET # BLD AUTO: 247 THOUSANDS/UL (ref 149–390)
PMV BLD AUTO: 11 FL (ref 8.9–12.7)
POTASSIUM SERPL-SCNC: 4.3 MMOL/L (ref 3.5–5.3)
PROT SERPL-MCNC: 6.9 G/DL (ref 6.4–8.4)
PSA SERPL-MCNC: 0.32 NG/ML (ref 0–4)
RBC # BLD AUTO: 4.71 MILLION/UL (ref 3.88–5.62)
SODIUM SERPL-SCNC: 137 MMOL/L (ref 135–147)
TRIGL SERPL-MCNC: 76 MG/DL (ref ?–150)
WBC # BLD AUTO: 5.29 THOUSAND/UL (ref 4.31–10.16)

## 2025-05-30 PROCEDURE — 36415 COLL VENOUS BLD VENIPUNCTURE: CPT

## 2025-05-30 PROCEDURE — 80061 LIPID PANEL: CPT

## 2025-05-30 PROCEDURE — 85025 COMPLETE CBC W/AUTO DIFF WBC: CPT

## 2025-05-30 PROCEDURE — G0103 PSA SCREENING: HCPCS

## 2025-05-30 PROCEDURE — 83036 HEMOGLOBIN GLYCOSYLATED A1C: CPT

## 2025-05-30 PROCEDURE — 80053 COMPREHEN METABOLIC PANEL: CPT

## 2025-06-06 ENCOUNTER — OFFICE VISIT (OUTPATIENT)
Dept: CARDIOLOGY CLINIC | Facility: CLINIC | Age: 85
End: 2025-06-06
Payer: COMMERCIAL

## 2025-06-06 VITALS
HEIGHT: 66 IN | SYSTOLIC BLOOD PRESSURE: 120 MMHG | BODY MASS INDEX: 25.55 KG/M2 | HEART RATE: 70 BPM | DIASTOLIC BLOOD PRESSURE: 60 MMHG | WEIGHT: 159 LBS | OXYGEN SATURATION: 97 %

## 2025-06-06 DIAGNOSIS — Z15.89 MTHFR MUTATION: ICD-10-CM

## 2025-06-06 DIAGNOSIS — E78.2 MIXED HYPERLIPIDEMIA: ICD-10-CM

## 2025-06-06 DIAGNOSIS — Z95.1 S/P CABG X 4: ICD-10-CM

## 2025-06-06 DIAGNOSIS — I25.10 CORONARY ARTERY DISEASE INVOLVING NATIVE CORONARY ARTERY OF NATIVE HEART WITHOUT ANGINA PECTORIS: Primary | ICD-10-CM

## 2025-06-06 DIAGNOSIS — I10 ESSENTIAL HYPERTENSION: ICD-10-CM

## 2025-06-06 PROCEDURE — 99214 OFFICE O/P EST MOD 30 MIN: CPT | Performed by: INTERNAL MEDICINE

## 2025-06-06 PROCEDURE — 93000 ELECTROCARDIOGRAM COMPLETE: CPT | Performed by: INTERNAL MEDICINE

## 2025-06-06 NOTE — PROGRESS NOTES
Subjective:     Kristopher Nunez is a 84 y.o. male  who presents to the office today for follow up of CAD.  Since his last visit, he has been feeling well.  he denies any palpitations, chest pain, shortness of breath, LE edema, orthopnea or PND.   He has pain in his left hip recently.    Diet is overall unchanged.  There has not been a significant change in weight.    He exercises with a treadmill 6 days a week/40 minutes a day.  Also strength trains regularly.     His last blood work was normal.  His last LDL cholesterol was 66 mg/dL.  HDL was low at 30 mg/dL     Previously, he underwent coronary artery bypass grafting on January 15, 2015. He had LIMA to LAD, SVG to PDA, SVG to OM. Catheterization was done prior to this for evaluation of chest pain. He had a 70% left main stenosis, 60% LAD stenosis, 80% circumflex disease and 90% PDA stenosis. Surgery had no complications.     The following portions of the patient's history were reviewed and updated as appropriate: allergies, current medications, past family history, past medical history, past social history, past surgical history, and problem list.    Review of Systems  Review of Systems   Respiratory:  Negative for shortness of breath.    Cardiovascular:  Negative for chest pain, palpitations and leg swelling.   Musculoskeletal:  Positive for arthralgias and myalgias.   All other systems reviewed and are negative.       Current Outpatient Medications on File Prior to Visit   Medication Sig Dispense Refill    aspirin 81 MG tablet Take 1 tablet by mouth daily at bedtime      atorvastatin (LIPITOR) 10 mg tablet TAKE 1 TABLET BY MOUTH DAILY 90 tablet 1    azelastine (ASTELIN) 0.1 % nasal spray 1-2 sprays into each nostril 2 (two) times a day as needed for rhinitis Use in each nostril as directed 30 mL 5    ciclopirox (LOPROX) 0.77 % SUSP Apply 1 Application topically 2 (two) times a day 30 mL 2    Coenzyme Q10 (CoQ10) 100 MG CAPS Take by mouth      escitalopram  "(LEXAPRO) 10 mg tablet TAKE 1 TABLET BY MOUTH DAILY 90 tablet 1    finasteride (PROSCAR) 5 mg tablet TAKE 1 TABLET BY MOUTH DAILY 90 tablet 1    fluticasone (FLONASE) 50 mcg/act nasal spray 1 spray into each nostril daily as needed for rhinitis 18.2 mL 3    losartan (COZAAR) 25 mg tablet 1 tablet in the AM and 2 tablet in the  tablet 2    tamsulosin (FLOMAX) 0.4 mg TAKE 1 CAPSULE BY MOUTH DAILY  WITH DINNER 90 capsule 1    trospium chloride (SANCTURA) 20 mg tablet TAKE 1 TABLET BY MOUTH DAILY  AFTER DINNER 90 tablet 1    VITAMIN D PO Take 2,000 Units by mouth every morning       No current facility-administered medications on file prior to visit.          Objective:      Physical Exam  /60 (BP Location: Left arm, Patient Position: Sitting, Cuff Size: Large)   Pulse 70   Ht 5' 6\" (1.676 m)   Wt 72.1 kg (159 lb)   SpO2 97%   BMI 25.66 kg/m²    Physical Exam   Constitutional: He appears healthy. No distress.   Eyes: Pupils are equal, round, and reactive to light. Conjunctivae are normal.   Neck: No JVD present.   Cardiovascular: Normal rate, regular rhythm and normal heart sounds. Exam reveals no gallop and no friction rub.   No murmur heard.  Pulmonary/Chest: Effort normal and breath sounds normal. He has no wheezes. He has no rales.   Musculoskeletal:         General: No tenderness, deformity or edema.      Cervical back: Normal range of motion and neck supple.   Neurological: He is alert and oriented to person, place, and time.   Skin: Skin is warm and dry.        Cardiographics    See above    ECG: NSR with rate 70    Lab Review   Lab Results   Component Value Date     10/23/2015    K 4.3 05/30/2025    K 4.3 06/01/2020     05/30/2025     06/01/2020    CO2 26 05/30/2025    CO2 24 06/01/2020    BUN 19 05/30/2025    BUN 20 06/01/2020    CREATININE 1.01 05/30/2025    CREATININE 1.2 10/23/2015    GLUCOSE 105 10/23/2015    CALCIUM 8.7 05/30/2025    CALCIUM 8.8 10/23/2015         Lab " Results   Component Value Date    CHOL 160 01/14/2015    TRIG 76 05/30/2025    TRIG 100 06/01/2020    HDL 30 (L) 05/30/2025    HDL 31 (L) 06/01/2020    LDLCALC 66 05/30/2025    LDLCALC 69 06/01/2020      Assessment/Plan:     Assessment & Plan  Coronary artery disease involving native coronary artery of native heart without angina pectoris  - Free of angina.    - Continue with regular exercise.   Essential hypertension  - Continue losartan 50 mg daily   Mixed hyperlipidemia  - Continue atorvastatin - last LDL was at goal.    - Lipid panel.  S/P CABG x 4  - Risk factor reduction reviewed - he exercises regularly.    - Last stress test was normal.    MTHFR mutation  - Continue losartan

## 2025-06-09 ENCOUNTER — OFFICE VISIT (OUTPATIENT)
Dept: FAMILY MEDICINE CLINIC | Facility: CLINIC | Age: 85
End: 2025-06-09
Payer: COMMERCIAL

## 2025-06-09 VITALS
HEIGHT: 66 IN | RESPIRATION RATE: 14 BRPM | WEIGHT: 156 LBS | BODY MASS INDEX: 25.07 KG/M2 | TEMPERATURE: 97 F | OXYGEN SATURATION: 98 % | SYSTOLIC BLOOD PRESSURE: 92 MMHG | HEART RATE: 68 BPM | DIASTOLIC BLOOD PRESSURE: 52 MMHG

## 2025-06-09 DIAGNOSIS — N40.1 BENIGN PROSTATIC HYPERPLASIA WITH NOCTURIA: ICD-10-CM

## 2025-06-09 DIAGNOSIS — I10 ESSENTIAL HYPERTENSION: Primary | ICD-10-CM

## 2025-06-09 DIAGNOSIS — E78.00 PURE HYPERCHOLESTEROLEMIA: ICD-10-CM

## 2025-06-09 DIAGNOSIS — R73.09 ABNORMAL GLUCOSE: ICD-10-CM

## 2025-06-09 DIAGNOSIS — R35.1 BENIGN PROSTATIC HYPERPLASIA WITH NOCTURIA: ICD-10-CM

## 2025-06-09 PROCEDURE — G2211 COMPLEX E/M VISIT ADD ON: HCPCS | Performed by: FAMILY MEDICINE

## 2025-06-09 PROCEDURE — 99214 OFFICE O/P EST MOD 30 MIN: CPT | Performed by: FAMILY MEDICINE

## 2025-06-09 NOTE — PROGRESS NOTES
Name: Kristopher Nunez      : 1940      MRN: [id number]  Encounter Provider: Tita Rashid MD  Encounter Date: 2025   Encounter department: [facility]    Assessment & Plan  Essential hypertension  On the lower side of normal.  But the patient just went to the gym today  Continue medications as prescribed  Being seen by cardiology as well       Benign prostatic hyperplasia with nocturia  Significant improvement.  Which also has been demonstrated in his GFR that has improved       Pure hypercholesterolemia  LDL is at goal continue atorvastatin Lipitor 10 mg       Abnormal glucose  Continue to monitor.  Patient is currently prediabetic  Orders:  •  Hemoglobin A1C; Future  •  Basic metabolic panel; Future         History of Present Illness     84-year-old male presenting to the office for follow-up on chronic conditions.  Patient's wife is currently admitted for aspiration pneumonia.  Patient states that he has been stressed but he has been doing very well with continuing to go to the gym and taking all his meds as prescribed.  States that he got a different pill type for his blood pressure and he has not had the cough.  He did go see the allergist that is started him on medication as well            Review of Systems   Constitutional:  Negative for activity change, appetite change, chills, fatigue and fever.   HENT:  Negative for congestion.    Respiratory:  Negative for cough, chest tightness and shortness of breath.    Cardiovascular:  Negative for chest pain and leg swelling.   Gastrointestinal:  Negative for abdominal distention, abdominal pain, constipation, diarrhea, nausea and vomiting.   All other systems reviewed and are negative.    Past Medical History[1]  Past Surgical History[2]  Family History[3]  Social History[4]  Medications[5]  Allergies   Allergen Reactions   • Acetaminophen Throat Swelling   • Amoxicillin Rash   • Lisinopril Cough     Immunization History  "  Administered Date(s) Administered   • COVID-19 MODERNA VACC 0.5 ML IM 03/02/2021, 03/30/2021   • Influenza Split High Dose Preservative Free IM 10/02/2012, 10/25/2013, 10/15/2014, 10/23/2015, 10/26/2016, 09/29/2017, 10/18/2024   • Influenza, high dose seasonal 0.7 mL 10/29/2018, 11/04/2019, 11/20/2020, 10/15/2021, 10/20/2022, 10/13/2023   • Influenza, seasonal, injectable 11/11/2005, 11/21/2006, 11/13/2007, 11/24/2008, 12/04/2009, 12/01/2010, 09/19/2011   • Pneumococcal Conjugate 13-Valent 10/26/2016   • Pneumococcal Polysaccharide PPV23 11/21/2006, 11/18/2019   • Td (adult), adsorbed 10/24/2000   • Zoster 10/28/2015     Objective   BP 92/52 (BP Location: Left arm, Patient Position: Sitting, Cuff Size: Standard)   Pulse 68   Temp (!) 97 °F (36.1 °C) (Temporal)   Resp 14   Ht 5' 6\" (1.676 m)   Wt 70.8 kg (156 lb)   SpO2 98%   BMI 25.18 kg/m²     Physical Exam  Constitutional:       Appearance: He is well-developed.   HENT:      Head: Normocephalic and atraumatic.      Right Ear: Tympanic membrane and external ear normal.      Left Ear: Tympanic membrane and external ear normal.     Eyes:      Conjunctiva/sclera: Conjunctivae normal.      Pupils: Pupils are equal, round, and reactive to light.       Cardiovascular:      Rate and Rhythm: Normal rate.      Pulses: Normal pulses.   Pulmonary:      Effort: Pulmonary effort is normal.     Neurological:      Mental Status: He is alert and oriented to person, place, and time.     Psychiatric:         Behavior: Behavior normal.         Thought Content: Thought content normal.         Judgment: Judgment normal.                [1]  Past Medical History:  Diagnosis Date   • Allergic    • Arthritis    • Benign essential hypertension    • Chronic kidney disease    • Coronary artery disease    • Hearing problem    • HL (hearing loss)    • Hx of CABG    • Hypercholesteremia    • Hypertension    • Wears glasses     as needed   • Wears hearing aid     bilateral aids "   [2]  Past Surgical History:  Procedure Laterality Date   • COLONOSCOPY  2021   • CORONARY ARTERY BYPASS GRAFT      triple   • EGD      stomach polyp   • EYE SURGERY  3/2021   • HERNIA REPAIR Bilateral     inguinal   • KNEE SURGERY Right     ligament   • LASIK     • VA XCAPSL CTRC RMVL INSJ IO LENS PROSTH W/O ECP Left 2021    Procedure: EXTRACTION EXTRACAPSULAR CATARACT PHACO INTRAOCULAR LENS (IOL);  Surgeon: Yonis Sena MD;  Location: [facility] MAIN OR;  Service: Ophthalmology   • VA XCAPSL CTRC RMVL INSJ IO LENS PROSTH W/O ECP Right 03/15/2021    Procedure: EXTRACTION EXTRACAPSULAR CATARACT PHACO INTRAOCULAR LENS (IOL);  Surgeon: Yonis Sena MD;  Location: [facility] MAIN OR;  Service: Ophthalmology   [3]  Family History  Problem Relation Name Age of Onset   • Hypertension Mother Adia Nunez    • Dementia Mother Adia Nunez    • Coronary artery disease Father Anthony Nunez    • Stroke Father Anthony Swifts    • Heart disease Father Anthony Swifts    • Hearing loss Father Anthony Nunez    • Cancer Father Anthony Nunez    • Aneurysm Brother          abdominal aortic aneurysm   [4]  Social History  Tobacco Use   • Smoking status: Former     Current packs/day: 0.00     Average packs/day: 1 pack/day for 12.8 years (12.8 ttl pk-yrs)     Types: Cigarettes     Start date: 1957     Quit date: 1970     Years since quittin.2   • Smokeless tobacco: Never   Vaping Use   • Vaping status: Never Used   Substance and Sexual Activity   • Alcohol use: Not Currently     Comment: quit    • Drug use: Never   [5]  Current Outpatient Medications on File Prior to Visit   Medication Sig   • aspirin 81 MG tablet Take 1 tablet by mouth daily at bedtime   • atorvastatin (LIPITOR) 10 mg tablet TAKE 1 TABLET BY MOUTH DAILY   • azelastine (ASTELIN) 0.1 % nasal spray 1-2 sprays into each nostril 2 (two) times a day as needed for rhinitis Use in each nostril as directed   • ciclopirox (LOPROX) 0.77 %  SUSP Apply 1 Application topically 2 (two) times a day   • Coenzyme Q10 (CoQ10) 100 MG CAPS Take by mouth   • escitalopram (LEXAPRO) 10 mg tablet TAKE 1 TABLET BY MOUTH DAILY   • finasteride (PROSCAR) 5 mg tablet TAKE 1 TABLET BY MOUTH DAILY   • fluticasone (FLONASE) 50 mcg/act nasal spray 1 spray into each nostril daily as needed for rhinitis   • losartan (COZAAR) 25 mg tablet 1 tablet in the AM and 2 tablet in the PM   • tamsulosin (FLOMAX) 0.4 mg TAKE 1 CAPSULE BY MOUTH DAILY  WITH DINNER   • trospium chloride (SANCTURA) 20 mg tablet TAKE 1 TABLET BY MOUTH DAILY  AFTER DINNER   • VITAMIN D PO Take 2,000 Units by mouth every morning

## 2025-06-12 DIAGNOSIS — R35.0 URINE FREQUENCY: ICD-10-CM

## 2025-06-12 DIAGNOSIS — E78.5 HYPERLIPIDEMIA, UNSPECIFIED HYPERLIPIDEMIA TYPE: ICD-10-CM

## 2025-06-12 RX ORDER — TAMSULOSIN HYDROCHLORIDE 0.4 MG/1
0.4 CAPSULE ORAL
Qty: 90 CAPSULE | Refills: 1 | Status: SHIPPED | OUTPATIENT
Start: 2025-06-12

## 2025-06-12 RX ORDER — ATORVASTATIN CALCIUM 10 MG/1
10 TABLET, FILM COATED ORAL DAILY
Qty: 90 TABLET | Refills: 1 | Status: SHIPPED | OUTPATIENT
Start: 2025-06-12

## 2025-07-21 ENCOUNTER — OFFICE VISIT (OUTPATIENT)
Dept: FAMILY MEDICINE CLINIC | Facility: CLINIC | Age: 85
End: 2025-07-21
Payer: COMMERCIAL

## 2025-07-21 ENCOUNTER — APPOINTMENT (OUTPATIENT)
Dept: RADIOLOGY | Facility: CLINIC | Age: 85
End: 2025-07-21
Payer: COMMERCIAL

## 2025-07-21 VITALS
SYSTOLIC BLOOD PRESSURE: 122 MMHG | HEART RATE: 82 BPM | DIASTOLIC BLOOD PRESSURE: 60 MMHG | TEMPERATURE: 97 F | OXYGEN SATURATION: 95 % | HEIGHT: 66 IN | WEIGHT: 160 LBS | BODY MASS INDEX: 25.71 KG/M2 | RESPIRATION RATE: 14 BRPM

## 2025-07-21 DIAGNOSIS — S89.92XS INJURY OF LEFT KNEE, SEQUELA: ICD-10-CM

## 2025-07-21 DIAGNOSIS — I10 ESSENTIAL HYPERTENSION: ICD-10-CM

## 2025-07-21 DIAGNOSIS — S89.92XS INJURY OF LEFT KNEE, SEQUELA: Primary | ICD-10-CM

## 2025-07-21 DIAGNOSIS — I25.119 CORONARY ARTERY DISEASE INVOLVING NATIVE CORONARY ARTERY OF NATIVE HEART WITH ANGINA PECTORIS (HCC): ICD-10-CM

## 2025-07-21 PROCEDURE — 99214 OFFICE O/P EST MOD 30 MIN: CPT | Performed by: FAMILY MEDICINE

## 2025-07-21 PROCEDURE — G2211 COMPLEX E/M VISIT ADD ON: HCPCS | Performed by: FAMILY MEDICINE

## 2025-07-21 PROCEDURE — 73564 X-RAY EXAM KNEE 4 OR MORE: CPT

## 2025-07-21 RX ORDER — NAPROXEN SODIUM 220 MG/1
220 TABLET, FILM COATED ORAL 2 TIMES DAILY WITH MEALS
COMMUNITY

## 2025-07-21 RX ORDER — FLUTICASONE PROPIONATE AND SALMETEROL 100; 50 UG/1; UG/1
1 POWDER RESPIRATORY (INHALATION) 2 TIMES DAILY
COMMUNITY
Start: 2025-07-17

## 2025-07-21 NOTE — PROGRESS NOTES
Name: Kristopher Nunez      : 1940      MRN: [id number]  Encounter Provider: Tita Rashid MD  Encounter Date: 2025   Encounter department: [facility]    Assessment & Plan  Coronary artery disease involving native coronary artery of native heart with angina pectoris (HCC)  Continue following up cardiology       Injury of left knee, sequela  Likely a strain but given patient's history unfortunately do not want to give him any muscle relaxant or NSAIDs.  I would like the orthopedic to evaluate him if there is no improvement.  Orders:  •  Ambulatory Referral to Orthopedic Surgery; Future  •  XR knee 4+ vw left injury; Future    Essential hypertension  BP has been stable            History of Present Illness     HPI  Chief Complaint   Patient presents with   • Knee Pain     Left knee, leg and hip     Still going to the Gym  Started to walk and bothers him after the Gym  Saturday;  rested felt better  2 weeks ago Trip but didn't Fall  Answering a phone with slipper , slipped on the slipper.  Right hip groin and Right knee pain  Unfortunate just recently lost his wife.  Patient from a cardiac standpoint follows his cardiologist  Review of Systems   Constitutional:  Negative for activity change, appetite change, chills, fatigue and fever.   HENT:  Negative for congestion.    Respiratory:  Negative for cough, chest tightness and shortness of breath.    Cardiovascular:  Negative for chest pain and leg swelling.   Gastrointestinal:  Negative for abdominal distention, abdominal pain, constipation, diarrhea, nausea and vomiting.   Musculoskeletal:  Positive for arthralgias.   All other systems reviewed and are negative.    Past Medical History[1]  Past Surgical History[1]  Family History[1]  Social History[1]  Medications[1]  Allergies   Allergen Reactions   • Acetaminophen Throat Swelling   • Amoxicillin Rash   • Lisinopril Cough     Immunization History   Administered Date(s)  "Administered   • COVID-19 MODERNA VACC 0.5 ML IM 03/02/2021, 03/30/2021   • Influenza Split High Dose Preservative Free IM 10/02/2012, 10/25/2013, 10/15/2014, 10/23/2015, 10/26/2016, 09/29/2017, 10/18/2024   • Influenza, high dose seasonal 0.7 mL 10/29/2018, 11/04/2019, 11/20/2020, 10/15/2021, 10/20/2022, 10/13/2023   • Influenza, seasonal, injectable 11/11/2005, 11/21/2006, 11/13/2007, 11/24/2008, 12/04/2009, 12/01/2010, 09/19/2011   • Pneumococcal Conjugate 13-Valent 10/26/2016   • Pneumococcal Polysaccharide PPV23 11/21/2006, 11/18/2019   • Td (adult), adsorbed 10/24/2000   • Zoster 10/28/2015     Objective   /60 (BP Location: Left arm, Patient Position: Sitting, Cuff Size: Standard)   Pulse 82   Temp (!) 97 °F (36.1 °C) (Temporal)   Resp 14   Ht 5' 6\" (1.676 m)   Wt 72.6 kg (160 lb)   SpO2 95%   BMI 25.82 kg/m²     Physical Exam  Constitutional:       Appearance: He is well-developed.   HENT:      Head: Normocephalic and atraumatic.     Eyes:      Conjunctiva/sclera: Conjunctivae normal.      Pupils: Pupils are equal, round, and reactive to light.     Pulmonary:      Effort: Pulmonary effort is normal.     Musculoskeletal:         General: Tenderness present.      Comments: Swelling of the left knee     Neurological:      Mental Status: He is alert and oriented to person, place, and time.     Psychiatric:         Behavior: Behavior normal.         Thought Content: Thought content normal.         Judgment: Judgment normal.                [1]  Past Medical History:  Diagnosis Date   • Allergic    • Arthritis    • Benign essential hypertension    • Chronic kidney disease    • Coronary artery disease    • Hearing problem    • HL (hearing loss)    • Hx of CABG    • Hypercholesteremia    • Hypertension    • Wears glasses     as needed   • Wears hearing aid     bilateral aids   [1]  Past Surgical History:  Procedure Laterality Date   • COLONOSCOPY  01/13/2021   • CORONARY ARTERY BYPASS GRAFT      triple "   • EGD      stomach polyp   • EYE SURGERY  3/2021   • HERNIA REPAIR Bilateral     inguinal   • KNEE SURGERY Right     ligament   • LASIK     • NE XCAPSL CTRC RMVL INSJ IO LENS PROSTH W/O ECP Left 2021    Procedure: EXTRACTION EXTRACAPSULAR CATARACT PHACO INTRAOCULAR LENS (IOL);  Surgeon: Yonis Sena MD;  Location: [facility] MAIN OR;  Service: Ophthalmology   • NE XCAPSL CTRC RMVL INSJ IO LENS PROSTH W/O ECP Right 03/15/2021    Procedure: EXTRACTION EXTRACAPSULAR CATARACT PHACO INTRAOCULAR LENS (IOL);  Surgeon: Yonis Sena MD;  Location: [facility] MAIN OR;  Service: Ophthalmology   [1]  Family History  Problem Relation Name Age of Onset   • Hypertension Mother Adia Nunez    • Dementia Mother Adia Nunez    • Coronary artery disease Father Anthony Nunez    • Stroke Father Anthony Nunez    • Heart disease Father Anthony Swifts    • Hearing loss Father Anthony Nunez    • Cancer Father Anthony Nunez    • Aneurysm Brother          abdominal aortic aneurysm   [1]  Social History  Tobacco Use   • Smoking status: Former     Current packs/day: 0.00     Average packs/day: 1 pack/day for 12.8 years (12.8 ttl pk-yrs)     Types: Cigarettes     Start date: 1957     Quit date: 1970     Years since quittin.3   • Smokeless tobacco: Never   Vaping Use   • Vaping status: Never Used   Substance and Sexual Activity   • Alcohol use: Not Currently     Comment: quit    • Drug use: Never   [1]  Current Outpatient Medications on File Prior to Visit   Medication Sig   • aspirin 81 MG tablet Take 1 tablet by mouth daily at bedtime   • atorvastatin (LIPITOR) 10 mg tablet TAKE 1 TABLET BY MOUTH DAILY   • azelastine (ASTELIN) 0.1 % nasal spray 1-2 sprays into each nostril 2 (two) times a day as needed for rhinitis Use in each nostril as directed   • Coenzyme Q10 (CoQ10) 100 MG CAPS Take by mouth   • escitalopram (LEXAPRO) 10 mg tablet TAKE 1 TABLET BY MOUTH DAILY   • finasteride (PROSCAR) 5 mg tablet  TAKE 1 TABLET BY MOUTH DAILY   • fluticasone (FLONASE) 50 mcg/act nasal spray 1 spray into each nostril daily as needed for rhinitis   • Fluticasone-Salmeterol (Advair) 100-50 mcg/dose inhaler Inhale 1 puff 2 (two) times a day   • loratadine-pseudoephedrine (CLARITIN-D 12-HOUR) 5-120 mg per tablet Take 1 tablet by mouth 2 (two) times a day   • losartan (COZAAR) 25 mg tablet 1 tablet in the AM and 2 tablet in the PM   • naproxen sodium (Aleve) 220 MG tablet Take 220 mg by mouth 2 (two) times a day with meals   • tamsulosin (FLOMAX) 0.4 mg TAKE 1 CAPSULE BY MOUTH DAILY  WITH DINNER   • trospium chloride (SANCTURA) 20 mg tablet TAKE 1 TABLET BY MOUTH DAILY  AFTER DINNER   • VITAMIN D PO Take 2,000 Units by mouth every morning   • ciclopirox (LOPROX) 0.77 % SUSP Apply 1 Application topically 2 (two) times a day (Patient not taking: Reported on 7/21/2025)

## 2025-07-25 ENCOUNTER — TELEPHONE (OUTPATIENT)
Dept: FAMILY MEDICINE CLINIC | Facility: CLINIC | Age: 85
End: 2025-07-25

## 2025-08-01 ENCOUNTER — CONSULT (OUTPATIENT)
Age: 85
End: 2025-08-01
Attending: FAMILY MEDICINE
Payer: COMMERCIAL

## 2025-08-01 VITALS — BODY MASS INDEX: 25.71 KG/M2 | HEIGHT: 66 IN | WEIGHT: 160 LBS

## 2025-08-01 DIAGNOSIS — M17.12 PRIMARY OSTEOARTHRITIS OF LEFT KNEE: Primary | ICD-10-CM

## 2025-08-01 PROCEDURE — 99203 OFFICE O/P NEW LOW 30 MIN: CPT | Performed by: ORTHOPAEDIC SURGERY

## 2025-08-01 PROCEDURE — 20610 DRAIN/INJ JOINT/BURSA W/O US: CPT | Performed by: ORTHOPAEDIC SURGERY

## 2025-08-01 RX ADMIN — TRIAMCINOLONE ACETONIDE 40 MG: 40 INJECTION, SUSPENSION INTRA-ARTICULAR; INTRAMUSCULAR at 13:00

## 2025-08-01 RX ADMIN — ROPIVACAINE HYDROCHLORIDE 4 ML: 2 INJECTION, SOLUTION EPIDURAL; INFILTRATION; PERINEURAL at 13:00

## 2025-08-04 ENCOUNTER — TELEPHONE (OUTPATIENT)
Age: 85
End: 2025-08-04

## 2025-08-05 ENCOUNTER — OFFICE VISIT (OUTPATIENT)
Age: 85
End: 2025-08-05
Payer: COMMERCIAL

## 2025-08-05 VITALS
BODY MASS INDEX: 25.39 KG/M2 | DIASTOLIC BLOOD PRESSURE: 81 MMHG | HEIGHT: 66 IN | WEIGHT: 158 LBS | HEART RATE: 78 BPM | OXYGEN SATURATION: 98 % | SYSTOLIC BLOOD PRESSURE: 175 MMHG | TEMPERATURE: 97.1 F

## 2025-08-05 DIAGNOSIS — S76.212A STRAIN OF GROIN, LEFT, INITIAL ENCOUNTER: Primary | ICD-10-CM

## 2025-08-05 PROCEDURE — 99214 OFFICE O/P EST MOD 30 MIN: CPT | Performed by: SURGERY

## 2025-08-07 RX ORDER — TRIAMCINOLONE ACETONIDE 40 MG/ML
40 INJECTION, SUSPENSION INTRA-ARTICULAR; INTRAMUSCULAR
Status: COMPLETED | OUTPATIENT
Start: 2025-08-01 | End: 2025-08-01

## 2025-08-07 RX ORDER — ROPIVACAINE HYDROCHLORIDE 2 MG/ML
4 INJECTION, SOLUTION EPIDURAL; INFILTRATION; PERINEURAL
Status: COMPLETED | OUTPATIENT
Start: 2025-08-01 | End: 2025-08-01

## 2025-08-14 ENCOUNTER — OFFICE VISIT (OUTPATIENT)
Dept: AUDIOLOGY | Facility: CLINIC | Age: 85
End: 2025-08-14
Payer: COMMERCIAL

## 2025-08-14 DIAGNOSIS — H90.3 SENSORY HEARING LOSS, BILATERAL: Primary | ICD-10-CM

## 2025-08-14 PROCEDURE — 92567 TYMPANOMETRY: CPT | Performed by: AUDIOLOGIST

## 2025-08-14 PROCEDURE — 92557 COMPREHENSIVE HEARING TEST: CPT | Performed by: AUDIOLOGIST

## 2025-08-22 DIAGNOSIS — F33.9 DEPRESSION, RECURRENT (HCC): ICD-10-CM

## 2025-08-22 DIAGNOSIS — N40.1 BENIGN PROSTATIC HYPERPLASIA WITH NOCTURIA: ICD-10-CM

## 2025-08-22 DIAGNOSIS — R35.1 BENIGN PROSTATIC HYPERPLASIA WITH NOCTURIA: ICD-10-CM

## 2025-08-22 RX ORDER — TROSPIUM CHLORIDE 20 MG/1
20 TABLET, FILM COATED ORAL
Qty: 90 TABLET | Refills: 1 | Status: SHIPPED | OUTPATIENT
Start: 2025-08-22

## 2025-08-22 RX ORDER — ESCITALOPRAM OXALATE 10 MG/1
10 TABLET ORAL DAILY
Qty: 90 TABLET | Refills: 1 | Status: SHIPPED | OUTPATIENT
Start: 2025-08-22

## (undated) DEVICE — CLEARCUT® SLIT KNIFE INTREPID MICRO-COAXIAL SYSTEM 2.4 SB: Brand: CLEARCUT®; INTREPID

## (undated) DEVICE — THE MONARCH® "D" CARTRIDGE IS A SINGLE-USE POLYPROPYLENE CARTRIDGE FOR POSTERIOR CHAMBER IOL DELIVERY: Brand: MONARCH® III

## (undated) DEVICE — EYE PACK CUSTOM -FINNEGAN

## (undated) DEVICE — ACTIVE FMS W/ INTREPID* ULTRA SLEEVES, 0.9MM 45° ABS* INTREPID* BALANCED TIP: Brand: ALCON

## (undated) DEVICE — AIR INJECT CANNULA 27GA: Brand: OPHTHALMIC CANNULA

## (undated) DEVICE — GLOVE SRG BIOGEL 7.5

## (undated) DEVICE — MICROSURGICAL INSTRUMENT IRR. CYSTITOME 25GA STRAIGHT-REVERSE CUTTING: Brand: ALCON

## (undated) DEVICE — B-H IRRIGATING CAN 19GA FLAT ANGLED 8MM: Brand: OPHTHALMIC CANNULA

## (undated) DEVICE — INTREPID® TRANSFORMER IA HP: Brand: INTREPID®